# Patient Record
Sex: MALE | Race: WHITE | NOT HISPANIC OR LATINO | Employment: OTHER | URBAN - METROPOLITAN AREA
[De-identification: names, ages, dates, MRNs, and addresses within clinical notes are randomized per-mention and may not be internally consistent; named-entity substitution may affect disease eponyms.]

---

## 2017-01-06 RX ORDER — GABAPENTIN 300 MG/1
CAPSULE ORAL
Qty: 540 CAPSULE | Refills: 2 | Status: SHIPPED | OUTPATIENT
Start: 2017-01-06 | End: 2017-05-10

## 2017-04-12 RX ORDER — CLOPIDOGREL BISULFATE 75 MG/1
TABLET ORAL
Qty: 90 TABLET | Refills: 5 | Status: CANCELLED | OUTPATIENT
Start: 2017-04-12

## 2017-04-17 ENCOUNTER — HOSPITAL ENCOUNTER (OUTPATIENT)
Dept: RADIOLOGY | Facility: HOSPITAL | Age: 67
Discharge: HOME | End: 2017-04-17
Attending: ORTHOPAEDIC SURGERY
Payer: MEDICARE

## 2017-04-17 DIAGNOSIS — M75.121 COMPLETE TEAR OF RIGHT ROTATOR CUFF: ICD-10-CM

## 2017-04-17 PROCEDURE — 73040 CONTRAST X-RAY OF SHOULDER: CPT | Mod: 26,RT,, | Performed by: RADIOLOGY

## 2017-04-17 PROCEDURE — 23350 INJECTION FOR SHOULDER X-RAY: CPT | Mod: TC,PO,RT

## 2017-04-17 PROCEDURE — 25500020 PHARM REV CODE 255: Mod: PO

## 2017-04-17 PROCEDURE — 73201 CT UPPER EXTREMITY W/DYE: CPT | Mod: TC,PO,RT

## 2017-04-17 PROCEDURE — 73201 CT UPPER EXTREMITY W/DYE: CPT | Mod: 26,RT,, | Performed by: RADIOLOGY

## 2017-04-17 PROCEDURE — 23350 INJECTION FOR SHOULDER X-RAY: CPT | Mod: ,,, | Performed by: RADIOLOGY

## 2017-04-17 RX ADMIN — IOHEXOL 6 ML: 240 INJECTION, SOLUTION INTRATHECAL; INTRAVASCULAR; INTRAVENOUS; ORAL at 01:04

## 2017-04-24 ENCOUNTER — TELEPHONE (OUTPATIENT)
Dept: VASCULAR SURGERY | Facility: CLINIC | Age: 67
End: 2017-04-24

## 2017-04-24 NOTE — TELEPHONE ENCOUNTER
----- Message from Shantel Miller sent at 4/24/2017  1:53 PM CDT -----  Contact: Patient in clinic  [facility] has tried three times to get someone to OK the generic Plavix (CLOPIDOGREL) refill. An OK for the refill needs to be faxed to [facility]. Thanks, psl

## 2017-04-25 NOTE — TELEPHONE ENCOUNTER
----- Message from Siri Duke sent at 4/24/2017  4:47 PM CDT -----  Contact: Patient  Patient returning call. Please call back at [phone]. Thanks,

## 2017-04-25 NOTE — TELEPHONE ENCOUNTER
Advised pt that I will need to discuss with  regarding refill on Plavix.  I will notify pt once okayed by .  Verbalized understanding.

## 2017-05-01 ENCOUNTER — TELEPHONE (OUTPATIENT)
Dept: VASCULAR SURGERY | Facility: CLINIC | Age: 67
End: 2017-05-01

## 2017-05-01 NOTE — TELEPHONE ENCOUNTER
----- Message from Juan Diego Chapman sent at 4/28/2017  2:19 PM CDT -----  Contact: same  Patient called in and requested a message be sent regarding patients Plavix Rx.  Patient stated he spoke to nurse on 4/24/17 about this and wanted to talk to her again about this Rx getting it thru [facility] pharmacy.    Patients call back number is [phone]

## 2017-05-01 NOTE — TELEPHONE ENCOUNTER
----- Message from Thomas Sy sent at 5/1/2017 12:49 PM CDT -----  Contact: Self  [phone]  Patient needs an appointment to see the doctor only for left hand pain.  Thanks!

## 2017-05-01 NOTE — TELEPHONE ENCOUNTER
Advised pt that as soon as I get the okay from  I can send in for the refill of Plavix.  Again pt absolutely denies any allergy to Plavix.

## 2017-05-01 NOTE — TELEPHONE ENCOUNTER
----- Message from Jazmin Kumar RN sent at 5/1/2017 10:14 AM CDT -----  Contact: [facility]-  [phone]      ----- Message -----     From: Thomas Sy     Sent: 4/28/2017  12:36 PM       To: Dina Stafford' Staff    Patient need rx refill plavix. 75 mg , erx rx request on 04/21/2017. Thanks!

## 2017-05-01 NOTE — TELEPHONE ENCOUNTER
Spoke to pt. Pt states he has been having a lot of cramping in his L hand and would like to see Dr. Iqbal only. Scheduled pt to see Dr. Iqbal on 5/2. Pt verbalized understanding.

## 2017-05-02 ENCOUNTER — OFFICE VISIT (OUTPATIENT)
Dept: FAMILY MEDICINE | Facility: CLINIC | Age: 67
End: 2017-05-02
Payer: MEDICARE

## 2017-05-02 ENCOUNTER — LAB VISIT (OUTPATIENT)
Dept: LAB | Facility: HOSPITAL | Age: 67
End: 2017-05-02
Attending: FAMILY MEDICINE
Payer: MEDICARE

## 2017-05-02 VITALS
WEIGHT: 265 LBS | BODY MASS INDEX: 41.59 KG/M2 | DIASTOLIC BLOOD PRESSURE: 84 MMHG | RESPIRATION RATE: 18 BRPM | HEART RATE: 72 BPM | SYSTOLIC BLOOD PRESSURE: 144 MMHG | HEIGHT: 67 IN

## 2017-05-02 DIAGNOSIS — I73.9 PAD (PERIPHERAL ARTERY DISEASE): ICD-10-CM

## 2017-05-02 DIAGNOSIS — R73.9 HYPERGLYCEMIA: ICD-10-CM

## 2017-05-02 DIAGNOSIS — R35.89 POLYURIA: ICD-10-CM

## 2017-05-02 DIAGNOSIS — I10 ESSENTIAL HYPERTENSION: ICD-10-CM

## 2017-05-02 DIAGNOSIS — E66.01 MORBID OBESITY, UNSPECIFIED OBESITY TYPE: ICD-10-CM

## 2017-05-02 DIAGNOSIS — M79.642 PAIN OF LEFT HAND: Primary | ICD-10-CM

## 2017-05-02 PROCEDURE — 99499 UNLISTED E&M SERVICE: CPT | Mod: S$GLB,,, | Performed by: FAMILY MEDICINE

## 2017-05-02 PROCEDURE — 83036 HEMOGLOBIN GLYCOSYLATED A1C: CPT

## 2017-05-02 PROCEDURE — 36415 COLL VENOUS BLD VENIPUNCTURE: CPT | Mod: PO

## 2017-05-02 PROCEDURE — 99999 PR PBB SHADOW E&M-EST. PATIENT-LVL III: CPT | Mod: PBBFAC,,, | Performed by: FAMILY MEDICINE

## 2017-05-02 PROCEDURE — 3079F DIAST BP 80-89 MM HG: CPT | Mod: S$GLB,,, | Performed by: FAMILY MEDICINE

## 2017-05-02 PROCEDURE — 99214 OFFICE O/P EST MOD 30 MIN: CPT | Mod: S$GLB,,, | Performed by: FAMILY MEDICINE

## 2017-05-02 PROCEDURE — 1160F RVW MEDS BY RX/DR IN RCRD: CPT | Mod: S$GLB,,, | Performed by: FAMILY MEDICINE

## 2017-05-02 PROCEDURE — 3077F SYST BP >= 140 MM HG: CPT | Mod: S$GLB,,, | Performed by: FAMILY MEDICINE

## 2017-05-02 RX ORDER — OXYCODONE AND ACETAMINOPHEN 10; 325 MG/1; MG/1
TABLET ORAL
Refills: 0 | COMMUNITY
Start: 2017-04-04 | End: 2019-07-19 | Stop reason: SDUPTHER

## 2017-05-02 RX ORDER — LOSARTAN POTASSIUM 100 MG/1
100 TABLET ORAL DAILY
Qty: 90 TABLET | Refills: 3 | Status: SHIPPED | OUTPATIENT
Start: 2017-05-02 | End: 2018-01-03 | Stop reason: SDUPTHER

## 2017-05-02 RX ORDER — GABAPENTIN 300 MG/1
300 CAPSULE ORAL 3 TIMES DAILY
COMMUNITY
End: 2017-05-10 | Stop reason: SDUPTHER

## 2017-05-02 RX ORDER — CLOPIDOGREL BISULFATE 75 MG/1
75 TABLET ORAL DAILY
Qty: 90 TABLET | Refills: 3 | Status: SHIPPED | OUTPATIENT
Start: 2017-05-02 | End: 2018-01-03 | Stop reason: SDUPTHER

## 2017-05-02 NOTE — MR AVS SNAPSHOT
[facility]  [address] [facility] [address]  [place] [address]  Phone: [phone]  Fax: [phone]                  Domingo Vaca   2017 10:30 AM   Office Visit    Description:  Male : 1950   Provider:  Marty Iqbal MD   Department:  [facility]           Reason for Visit     Hand Pain     Urinary Frequency           Diagnoses this Visit        Comments    Pain of left hand    -  Primary     Polyuria         Hyperglycemia         PAD (peripheral artery disease)         Morbid obesity, unspecified obesity type         Essential hypertension                To Do List           Future Appointments        Provider Department Dept Phone    2017 11:50 AM LAB, [place] [facility] [phone]      Goals (5 Years of Data)     None       These Medications        Disp Refills Start End    losartan (COZAAR) 100 MG tablet 90 tablet 3 2017     Take 1 tablet (100 mg total) by mouth once daily. - Oral    Pharmacy: [facility] - [address] Ph #: [phone]         [facility]     [facility] Nurse Care Line -  Assistance  Unless otherwise directed by your provider, please contact [facility], our nurse care line that is available for  assistance.     Registered nurses in the [facility] provide: appointment scheduling, clinical advisement, health education, and other advisory services.  Call: [phone] (toll free)               Medications           Message regarding Medications     Verify the changes and/or additions to your medication regime listed below are the same as discussed with your clinician today.  If any of these changes or additions are incorrect, please notify your healthcare provider.        CHANGE how you are taking these medications     Start Taking Instead of    losartan (COZAAR) 100 MG tablet losartan (COZAAR) 50 MG tablet    Dosage:  Take 1  "tablet (100 mg total) by mouth once daily. Dosage:  TAKE 1 TABLET ONE TIME DAILY    Reason for Change:  Reorder       STOP taking these medications     hydrocodone-acetaminophen 10-325mg (NORCO)  mg Tab Take 1 tablet by mouth every 6 (six) hours as needed.    oxycodone-acetaminophen (PERCOCET) 5-325 mg per tablet Take 1 tablet by mouth every 4 (four) hours as needed for Pain.           Verify that the below list of medications is an accurate representation of the medications you are currently taking.  If none reported, the list may be blank. If incorrect, please contact your healthcare provider. Carry this list with you in case of emergency.           Current Medications     acetaminophen (TYLENOL) 500 mg Cap Take 1,000 mg by mouth continuous prn.     aspirin 81 mg TbEF Take 1 tablet by mouth every evening.    chlorthalidone (HYGROTEN) 25 MG Tab TAKE 1 TABLET ONE TIME DAILY    citalopram (CELEXA) 20 MG tablet TAKE 1 TABLET ONE TIME DAILY    clopidogrel (PLAVIX) 75 mg tablet Take 75 mg by mouth once daily.    gabapentin (NEURONTIN) 300 MG capsule TAKE 2 CAPSULES THREE TIMES DAILY    gabapentin (NEURONTIN) 300 MG capsule Take 300 mg by mouth 3 (three) times daily.    losartan (COZAAR) 100 MG tablet Take 1 tablet (100 mg total) by mouth once daily.    multivit-min-FA-lycopen-lutein 0.4-300-250 mg-mcg-mcg Tab Take 1 tablet by mouth once daily.    oxycodone-acetaminophen (PERCOCET)  mg per tablet TK1 T PO Q 6 H PRN P    simvastatin (ZOCOR) 40 MG tablet TAKE 1 TABLET EVERY EVENING           Clinical Reference Information           Your Vitals Were     BP Pulse Resp Height Weight BMI    144/84 (BP Location: Left arm, Patient Position: Sitting, BP Method: Manual) 72 18 5' 7" (1.702 m) 120.2 kg (264 lb 15.9 oz) 41.5 kg/m2      Blood Pressure          Most Recent Value    BP  (!)  144/84      Allergies as of 5/2/2017     No Known Allergies      Immunizations Administered on Date of Encounter - 5/2/2017     None    "   Orders Placed During Today's Visit     Future Labs/Procedures Expected by Expires    Hemoglobin A1c  5/2/2017 7/1/2018      Language Assistance Services     ATTENTION: Language assistance services are available, free of charge. Please call [phone].      ATENCIÓN: Si marian gonzalez, tiene a erazo disposición servicios gratuitos de asistencia lingüística. Llame al [phone].     CHÚ Ý: N?u b?n nói Ti?ng Vi?t, có các d?ch v? h? tr? ngôn ng? mi?n phí dành cho b?n. G?i s? [phone].         [facility] complies with applicable Federal civil rights laws and does not discriminate on the basis of race, color, national origin, age, disability, or sex.

## 2017-05-02 NOTE — PROGRESS NOTES
Subjective:       Patient ID: Domingo Vaca is a 66 y.o. male    Chief Complaint: Hand Pain (pt states he gets severe patricia horses in his L hand when he moves his fingers) and Urinary Frequency    HPI  Patient with 2 concerns  1. Pain in the left hand with use.  Cramping with fine dexterity.  History of carpal tunnel repair x2 in that hand  2. Increased urinary frequency with no dysuria.  Occasional urgency with occasional dribbling    Review of Systems      Objective:   Physical Exam   Constitutional: He is oriented to person, place, and time. He appears well-developed and well-nourished. No distress.   Musculoskeletal:        Left hand: He exhibits normal range of motion and no tenderness. Normal sensation noted. Decreased strength noted. He exhibits finger abduction. He exhibits no thumb/finger opposition and no wrist extension trouble.   Neurological: He is alert and oriented to person, place, and time.   Vitals reviewed.        Assessment:       1. Pain of left hand     2. Polyuria  Hemoglobin A1c   3. Hyperglycemia  Hemoglobin A1c   4. PAD (peripheral artery disease)     5. Morbid obesity, unspecified obesity type     6. Essential hypertension  losartan (COZAAR) 100 MG tablet         Plan:       Pain of left hand  - Hand strengthening   - Consider OT    Polyuria with Hyperglycemia  -     Hemoglobin A1c; Future; Expected date: 5/2/17  - Consider treatment for BPH    Essential hypertension with PAD (peripheral artery disease) with Morbid obesity, unspecified obesity type  -     INCREASE losartan (COZAAR) 100 MG tablet; Take 1 tablet (100 mg total) by mouth once daily.  Dispense: 90 tablet; Refill: 3  - Continue current therapy  - Serial blood pressure monitoring  - Diet and exercise education.

## 2017-05-03 LAB
ESTIMATED AVG GLUCOSE: 151 MG/DL
HBA1C MFR BLD HPLC: 6.9 %

## 2017-05-10 RX ORDER — GABAPENTIN 300 MG/1
300 CAPSULE ORAL 3 TIMES DAILY
Qty: 270 CAPSULE | Refills: 3 | Status: SHIPPED | OUTPATIENT
Start: 2017-05-10 | End: 2017-05-12 | Stop reason: SDUPTHER

## 2017-05-10 NOTE — TELEPHONE ENCOUNTER
----- Message from Angie Pineda sent at 5/9/2017  4:16 PM CDT -----  Contact: [phone]  Patient requesting a refill on gabapentin 4 capsule 3x a day, he need a new prescription.    Patient will be using   [facility] - [place], [place] - [address]  [address] [address]  Phone: [phone] Fax: [phone]    Please call patient at [phone]. Thanks!

## 2017-05-12 RX ORDER — GABAPENTIN 300 MG/1
1200 CAPSULE ORAL 3 TIMES DAILY
Qty: 270 CAPSULE | Refills: 3 | Status: SHIPPED | OUTPATIENT
Start: 2017-05-12 | End: 2017-05-30

## 2017-05-12 NOTE — TELEPHONE ENCOUNTER
----- Message from RT Sandra sent at 5/12/2017 11:06 AM CDT -----  Contact: [phone]   Pt , requesting medication refill: Gabapentin, please call nurse Leela to confirm, thanks.

## 2017-05-30 ENCOUNTER — TELEPHONE (OUTPATIENT)
Dept: FAMILY MEDICINE | Facility: CLINIC | Age: 67
End: 2017-05-30

## 2017-05-30 RX ORDER — GABAPENTIN 600 MG/1
1200 TABLET ORAL 3 TIMES DAILY
Qty: 540 TABLET | Refills: 3 | Status: SHIPPED | OUTPATIENT
Start: 2017-05-30 | End: 2018-01-09 | Stop reason: SDUPTHER

## 2017-05-30 NOTE — TELEPHONE ENCOUNTER
Called Chika with [facility] pharmacy, insurance wont pay for the high number of capsules for 90 day supply for the gabapentin (300 mg take 4 capsules three times daily). She was wanting to know if can change to 600 mg tablet and take two three times a day. Please advise.

## 2017-05-30 NOTE — TELEPHONE ENCOUNTER
----- Message from Angie Pineda sent at 5/30/2017  9:46 AM CDT -----  Contact: [facility] ph#[phone] ext [phone]  [facility] with [facility] ph#[phone] ext [phone] call to change gabapentin medication to 600mg tab and readjust directions so that insurance will cover the charges.

## 2017-05-31 NOTE — TELEPHONE ENCOUNTER
Called Chika with [facility], advised PCP approved the change in gabapentin and she verbally understood.

## 2017-05-31 NOTE — TELEPHONE ENCOUNTER
----- Message from Kateryna Sosa sent at 5/31/2017 11:10 AM CDT -----  Chika with [facility] is returning nurses call contact her at [phone] ext [phone].    Thank you

## 2017-07-26 RX ORDER — SIMVASTATIN 40 MG/1
TABLET, FILM COATED ORAL
Qty: 90 TABLET | Refills: 0 | Status: SHIPPED | OUTPATIENT
Start: 2017-07-26 | End: 2017-10-02 | Stop reason: SDUPTHER

## 2017-07-26 RX ORDER — CHLORTHALIDONE 25 MG/1
TABLET ORAL
Qty: 90 TABLET | Refills: 0 | Status: SHIPPED | OUTPATIENT
Start: 2017-07-26 | End: 2017-10-02 | Stop reason: SDUPTHER

## 2017-07-26 RX ORDER — CITALOPRAM 20 MG/1
TABLET, FILM COATED ORAL
Qty: 90 TABLET | Refills: 0 | Status: SHIPPED | OUTPATIENT
Start: 2017-07-26 | End: 2017-10-02 | Stop reason: SDUPTHER

## 2017-08-09 ENCOUNTER — HOSPITAL ENCOUNTER (OUTPATIENT)
Dept: RADIOLOGY | Facility: HOSPITAL | Age: 67
Discharge: HOME | End: 2017-08-09
Attending: NURSE PRACTITIONER
Payer: MEDICARE

## 2017-08-09 ENCOUNTER — OFFICE VISIT (OUTPATIENT)
Dept: FAMILY MEDICINE | Facility: CLINIC | Age: 67
End: 2017-08-09
Payer: MEDICARE

## 2017-08-09 VITALS
BODY MASS INDEX: 42.56 KG/M2 | HEART RATE: 88 BPM | SYSTOLIC BLOOD PRESSURE: 142 MMHG | HEIGHT: 67 IN | DIASTOLIC BLOOD PRESSURE: 70 MMHG | WEIGHT: 271.19 LBS

## 2017-08-09 DIAGNOSIS — M79.89 RIGHT LEG SWELLING: ICD-10-CM

## 2017-08-09 DIAGNOSIS — M54.31 RIGHT SCIATIC NERVE PAIN: ICD-10-CM

## 2017-08-09 DIAGNOSIS — M79.661 PAIN OF RIGHT LOWER LEG: ICD-10-CM

## 2017-08-09 DIAGNOSIS — Z96.89 SPINAL CORD STIMULATOR STATUS: Primary | ICD-10-CM

## 2017-08-09 PROCEDURE — 99213 OFFICE O/P EST LOW 20 MIN: CPT | Mod: S$GLB,,, | Performed by: NURSE PRACTITIONER

## 2017-08-09 PROCEDURE — 99999 PR PBB SHADOW E&M-EST. PATIENT-LVL IV: CPT | Mod: PBBFAC,,, | Performed by: NURSE PRACTITIONER

## 2017-08-09 PROCEDURE — 3077F SYST BP >= 140 MM HG: CPT | Mod: S$GLB,,, | Performed by: NURSE PRACTITIONER

## 2017-08-09 PROCEDURE — 3008F BODY MASS INDEX DOCD: CPT | Mod: S$GLB,,, | Performed by: NURSE PRACTITIONER

## 2017-08-09 PROCEDURE — 93971 EXTREMITY STUDY: CPT | Mod: 26,,, | Performed by: RADIOLOGY

## 2017-08-09 PROCEDURE — 3078F DIAST BP <80 MM HG: CPT | Mod: S$GLB,,, | Performed by: NURSE PRACTITIONER

## 2017-08-09 PROCEDURE — 93971 EXTREMITY STUDY: CPT | Mod: TC,PO

## 2017-08-09 NOTE — PROGRESS NOTES
"Subjective:       Patient ID: Domingo Vaca is a 66 y.o. male.    Chief Complaint: Foot Swelling (R foot and ankle)    Severe low back/sciatic pain on right side- followed by pain management in [facility]. Will be scheduled for epidural injection   Today, c/o right leg/foot swelling. Reports "sciatic pain to right side"      Leg Pain    There was no injury mechanism. The pain is present in the right leg. The quality of the pain is described as aching. The pain is moderate. The pain has been fluctuating since onset. Associated symptoms include an inability to bear weight. Pertinent negatives include no loss of sensation, muscle weakness, numbness or tingling. He has tried nothing for the symptoms.     Review of Systems   Constitutional: Positive for activity change. Negative for appetite change, fatigue and fever.   Respiratory: Negative for cough, chest tightness, shortness of breath and wheezing.    Cardiovascular: Negative for chest pain, palpitations and leg swelling.   Gastrointestinal: Negative for abdominal pain, blood in stool, constipation, diarrhea, nausea and vomiting.   Neurological: Negative for tingling and numbness.       Objective:      Physical Exam   Constitutional: He is oriented to person, place, and time. He appears well-nourished.   Cardiovascular: Normal rate, regular rhythm, normal heart sounds and intact distal pulses.    Pulses:       Dorsalis pedis pulses are 2+ on the right side, and 2+ on the left side.        Posterior tibial pulses are 2+ on the right side, and 2+ on the left side.   Pulmonary/Chest: Effort normal and breath sounds normal.   Abdominal: Soft. Bowel sounds are normal. There is no tenderness.   Musculoskeletal:        Right lower leg: He exhibits swelling (2+.. 15in diameter) and edema. He exhibits no tenderness and no bony tenderness.        Left lower leg: He exhibits swelling (13 in diameter). He exhibits no edema.   Neurological: He is alert and oriented to person, " place, and time.   Skin: Skin is warm and dry. No rash noted.   Vitals reviewed.      Assessment:       1. Spinal cord stimulator status    2. Right sciatic nerve pain    3. Pain of right lower leg    4. Right leg swelling        Plan:       Domingo was seen today for foot swelling.    Diagnoses and all orders for this visit:    Spinal cord stimulator status  Followed up with pain management    Right sciatic nerve pain  Followed up with pain management    Pain of right lower leg  -     US Lower Extremity Veins Right; Future    Right leg swelling  -     US Lower Extremity Veins Right; Future    Compression stocking  Elevate extremity  Follow up with me tomorrow

## 2017-08-10 ENCOUNTER — OFFICE VISIT (OUTPATIENT)
Dept: FAMILY MEDICINE | Facility: CLINIC | Age: 67
End: 2017-08-10
Payer: MEDICARE

## 2017-08-10 VITALS
RESPIRATION RATE: 18 BRPM | SYSTOLIC BLOOD PRESSURE: 134 MMHG | BODY MASS INDEX: 42.56 KG/M2 | WEIGHT: 271.19 LBS | HEIGHT: 67 IN | DIASTOLIC BLOOD PRESSURE: 76 MMHG | HEART RATE: 80 BPM

## 2017-08-10 DIAGNOSIS — E66.01 MORBID OBESITY DUE TO EXCESS CALORIES: Primary | ICD-10-CM

## 2017-08-10 DIAGNOSIS — Z96.89 SPINAL CORD STIMULATOR STATUS: ICD-10-CM

## 2017-08-10 DIAGNOSIS — M51.36 DDD (DEGENERATIVE DISC DISEASE), LUMBAR: ICD-10-CM

## 2017-08-10 DIAGNOSIS — R60.0 LEG EDEMA, RIGHT: ICD-10-CM

## 2017-08-10 PROCEDURE — 99213 OFFICE O/P EST LOW 20 MIN: CPT | Mod: S$GLB,,, | Performed by: NURSE PRACTITIONER

## 2017-08-10 PROCEDURE — 99999 PR PBB SHADOW E&M-EST. PATIENT-LVL III: CPT | Mod: PBBFAC,,, | Performed by: NURSE PRACTITIONER

## 2017-08-10 PROCEDURE — 3008F BODY MASS INDEX DOCD: CPT | Mod: S$GLB,,, | Performed by: NURSE PRACTITIONER

## 2017-08-10 PROCEDURE — 3075F SYST BP GE 130 - 139MM HG: CPT | Mod: S$GLB,,, | Performed by: NURSE PRACTITIONER

## 2017-08-10 PROCEDURE — 99499 UNLISTED E&M SERVICE: CPT | Mod: S$GLB,,, | Performed by: NURSE PRACTITIONER

## 2017-08-10 PROCEDURE — 3078F DIAST BP <80 MM HG: CPT | Mod: S$GLB,,, | Performed by: NURSE PRACTITIONER

## 2017-08-15 NOTE — PROGRESS NOTES
Subjective:       Patient ID: Domingo Vaca is a 66 y.o. male.    Chief Complaint: Leg Swelling (R leg; follow up)    Here today for follow up appt for right leg pain and swelling  US yesterday was negative for DVT  Pt was home with compression stocking and instructed to elevate leg  Today, he reports an improvment      Review of Systems    Objective:      Physical Exam   Constitutional: He is oriented to person, place, and time. He appears well-nourished.   Cardiovascular: Normal rate, regular rhythm, normal heart sounds and intact distal pulses.    Pulmonary/Chest: Effort normal and breath sounds normal.   Musculoskeletal:        Right lower leg: He exhibits swelling (1-2 + ... decreasd to 14 in overnight) and edema.        Left lower leg: He exhibits no swelling (13 in) and no edema.   Neurological: He is alert and oriented to person, place, and time.   Skin: Skin is warm and dry.   Vitals reviewed.      Assessment:       1. Morbid obesity due to excess calories [E66.01]    2. Spinal cord stimulator status    3. Leg edema, right    4. DDD (degenerative disc disease), lumbar        Plan:       Domingo was seen today for leg swelling.    Diagnoses and all orders for this visit:    Morbid obesity due to excess calories [E66.01]  Encouraged healthy eating, weight loss and exercise    Spinal cord stimulator status  Continue pain management follow up     Leg edema, right  Compression stockings  Elevate extremities  Avoid legs in dependent position for prolonged periods    DDD (degenerative disc disease), lumbar  Continue pain management follow up

## 2017-10-02 RX ORDER — CHLORTHALIDONE 25 MG/1
TABLET ORAL
Qty: 90 TABLET | Refills: 2 | Status: SHIPPED | OUTPATIENT
Start: 2017-10-02 | End: 2018-01-03 | Stop reason: SDUPTHER

## 2017-10-02 RX ORDER — SIMVASTATIN 40 MG/1
40 TABLET, FILM COATED ORAL NIGHTLY
Qty: 90 TABLET | Refills: 2 | Status: SHIPPED | OUTPATIENT
Start: 2017-10-02 | End: 2018-02-20 | Stop reason: SDUPTHER

## 2017-10-02 RX ORDER — CITALOPRAM 20 MG/1
TABLET, FILM COATED ORAL
Qty: 90 TABLET | Refills: 0 | OUTPATIENT
Start: 2017-10-02

## 2017-10-02 RX ORDER — CHLORTHALIDONE 25 MG/1
TABLET ORAL
Qty: 90 TABLET | Refills: 0 | OUTPATIENT
Start: 2017-10-02

## 2017-10-02 RX ORDER — SIMVASTATIN 40 MG/1
TABLET, FILM COATED ORAL
Qty: 90 TABLET | Refills: 0 | OUTPATIENT
Start: 2017-10-02

## 2017-10-02 RX ORDER — CITALOPRAM 20 MG/1
TABLET, FILM COATED ORAL
Qty: 90 TABLET | Refills: 2 | Status: SHIPPED | OUTPATIENT
Start: 2017-10-02 | End: 2018-01-03 | Stop reason: SDUPTHER

## 2017-10-09 PROCEDURE — G0008 ADMIN INFLUENZA VIRUS VAC: HCPCS | Mod: S$GLB,,, | Performed by: FAMILY MEDICINE

## 2017-10-09 PROCEDURE — 90686 IIV4 VACC NO PRSV 0.5 ML IM: CPT | Mod: S$GLB,,, | Performed by: FAMILY MEDICINE

## 2017-11-08 PROBLEM — G57.30 COMMON PERONEAL NEUROPATHY: Status: ACTIVE | Noted: 2017-11-08

## 2018-01-03 DIAGNOSIS — I10 ESSENTIAL HYPERTENSION: ICD-10-CM

## 2018-01-03 RX ORDER — CLOPIDOGREL BISULFATE 75 MG/1
TABLET ORAL
Qty: 90 TABLET | Refills: 1 | Status: SHIPPED | OUTPATIENT
Start: 2018-01-03 | End: 2018-02-20 | Stop reason: SDUPTHER

## 2018-01-03 RX ORDER — LOSARTAN POTASSIUM 100 MG/1
TABLET ORAL
Qty: 90 TABLET | Refills: 1 | Status: SHIPPED | OUTPATIENT
Start: 2018-01-03 | End: 2018-02-20 | Stop reason: SDUPTHER

## 2018-01-04 ENCOUNTER — PATIENT MESSAGE (OUTPATIENT)
Dept: FAMILY MEDICINE | Facility: CLINIC | Age: 68
End: 2018-01-04

## 2018-01-04 RX ORDER — CHLORTHALIDONE 25 MG/1
TABLET ORAL
Qty: 90 TABLET | Refills: 0 | Status: SHIPPED | OUTPATIENT
Start: 2018-01-04 | End: 2018-02-20 | Stop reason: SDUPTHER

## 2018-01-04 RX ORDER — CITALOPRAM 20 MG/1
TABLET, FILM COATED ORAL
Qty: 90 TABLET | Refills: 0 | Status: SHIPPED | OUTPATIENT
Start: 2018-01-04 | End: 2018-02-20 | Stop reason: SDUPTHER

## 2018-01-04 NOTE — TELEPHONE ENCOUNTER
Temporary refill given. Will need appt with PCP team within 3 months for routine follow up and future med refills . Please schedule. Thank you

## 2018-01-10 RX ORDER — GABAPENTIN 600 MG/1
TABLET ORAL
Qty: 540 TABLET | Refills: 1 | Status: SHIPPED | OUTPATIENT
Start: 2018-01-10 | End: 2018-02-20 | Stop reason: SDUPTHER

## 2018-01-22 ENCOUNTER — TELEPHONE (OUTPATIENT)
Dept: FAMILY MEDICINE | Facility: CLINIC | Age: 68
End: 2018-01-22

## 2018-01-22 DIAGNOSIS — M54.9 BACK PAIN, UNSPECIFIED BACK LOCATION, UNSPECIFIED BACK PAIN LATERALITY, UNSPECIFIED CHRONICITY: Primary | ICD-10-CM

## 2018-01-22 NOTE — TELEPHONE ENCOUNTER
----- Message from Shantel Miller sent at 1/22/2018  3:03 PM CST -----  Contact: PT  Mr Vaca came into the clinic today to speak with Leela. He stated it was about a referral. He asked that someone return his call. Thanks psl

## 2018-01-22 NOTE — TELEPHONE ENCOUNTER
Spoke to patient and states he is wanting a referral to see  for a second opinion for his back. Pended to you. Please advise. Thanks.

## 2018-02-14 ENCOUNTER — OFFICE VISIT (OUTPATIENT)
Dept: NEUROSURGERY | Facility: CLINIC | Age: 68
End: 2018-02-14
Payer: MEDICARE

## 2018-02-14 VITALS
DIASTOLIC BLOOD PRESSURE: 75 MMHG | HEIGHT: 67 IN | SYSTOLIC BLOOD PRESSURE: 165 MMHG | HEART RATE: 84 BPM | BODY MASS INDEX: 42.21 KG/M2 | WEIGHT: 268.94 LBS

## 2018-02-14 DIAGNOSIS — Z96.89 S/P INSERTION OF SPINAL CORD STIMULATOR: ICD-10-CM

## 2018-02-14 DIAGNOSIS — Z96.89 SPINAL CORD STIMULATOR STATUS: ICD-10-CM

## 2018-02-14 DIAGNOSIS — M21.962 LEFT ANKLE JOINT DEFORMITY: ICD-10-CM

## 2018-02-14 DIAGNOSIS — M17.0 OSTEOARTHRITIS OF BOTH KNEES, UNSPECIFIED OSTEOARTHRITIS TYPE: Primary | ICD-10-CM

## 2018-02-14 DIAGNOSIS — Z98.1 S/P LUMBAR SPINAL FUSION: ICD-10-CM

## 2018-02-14 DIAGNOSIS — M48.02 CERVICAL STENOSIS OF SPINE: ICD-10-CM

## 2018-02-14 DIAGNOSIS — M47.816 LUMBAR FACET ARTHROPATHY: ICD-10-CM

## 2018-02-14 DIAGNOSIS — G62.9 PERIPHERAL POLYNEUROPATHY: ICD-10-CM

## 2018-02-14 DIAGNOSIS — I73.9 PAD (PERIPHERAL ARTERY DISEASE): ICD-10-CM

## 2018-02-14 DIAGNOSIS — G57.30 COMMON PERONEAL NEUROPATHY, UNSPECIFIED LATERALITY: ICD-10-CM

## 2018-02-14 DIAGNOSIS — E66.01 MORBID OBESITY DUE TO EXCESS CALORIES: ICD-10-CM

## 2018-02-14 DIAGNOSIS — M48.062 LUMBAR STENOSIS WITH NEUROGENIC CLAUDICATION: ICD-10-CM

## 2018-02-14 DIAGNOSIS — G90.522 COMPLEX REGIONAL PAIN SYNDROME TYPE 1 OF LEFT LOWER EXTREMITY: ICD-10-CM

## 2018-02-14 DIAGNOSIS — Z98.1 S/P CERVICAL SPINAL FUSION: ICD-10-CM

## 2018-02-14 PROCEDURE — 99205 OFFICE O/P NEW HI 60 MIN: CPT | Mod: S$GLB,,, | Performed by: NEUROLOGICAL SURGERY

## 2018-02-14 PROCEDURE — 3008F BODY MASS INDEX DOCD: CPT | Mod: S$GLB,,, | Performed by: NEUROLOGICAL SURGERY

## 2018-02-14 PROCEDURE — 99999 PR PBB SHADOW E&M-EST. PATIENT-LVL III: CPT | Mod: PBBFAC,,, | Performed by: NEUROLOGICAL SURGERY

## 2018-02-14 PROCEDURE — 99499 UNLISTED E&M SERVICE: CPT | Mod: S$GLB,,, | Performed by: NEUROLOGICAL SURGERY

## 2018-02-14 NOTE — LETTER
February 14, 2018      Marty Iqbal MD  [address] [facility] [address] [place] [address]           [facility]  [address] [facility] [address] [place] [address]  Phone: [phone]  Fax: [phone]          Patient: Domingo Vaca   MR Number: [id number]   YOB: 1950   Date of Visit: 2/14/2018       Dear Dr. Marty Iqbal:    Thank you for referring Domingo Vaca to me for evaluation. Attached you will find relevant portions of my assessment and plan of care.    If you have questions, please do not hesitate to call me. I look forward to following Domingo Vaca along with you.    Sincerely,    Sergio Camp MD    Enclosure  CC:  No Recipients    If you would like to receive this communication electronically, please contact [email or web address] or [phone] to request more information on [employer] access.    For providers and/or their staff who would like to refer a patient to [facility], please contact us through our one-stop-shop provider referral line, [facility], at [phone].    If you feel you have received this communication in error or would no longer like to receive these types of communications, please e-mail [email or web address]

## 2018-02-15 ENCOUNTER — TELEPHONE (OUTPATIENT)
Dept: FAMILY MEDICINE | Facility: CLINIC | Age: 68
End: 2018-02-15

## 2018-02-15 NOTE — TELEPHONE ENCOUNTER
----- Message from Krys Gordillo sent at 2/15/2018 10:21 AM CST -----  Contact: self  Patient [phone] is scheduled for back surgery on 03 27 18 and is needing a preop appt/please call patient to schedule as I do not show any appts until May 2018

## 2018-02-15 NOTE — TELEPHONE ENCOUNTER
Spoke to pt. Pt states he is having surgery on 3/27 and needs to see Dr. Iqbal for a pre op clearance prior. Scheduled pt to see Dr. Iqbal on 3/14. Pt verbalized understanding.

## 2018-02-16 ENCOUNTER — TELEPHONE (OUTPATIENT)
Dept: FAMILY MEDICINE | Facility: CLINIC | Age: 68
End: 2018-02-16

## 2018-02-16 NOTE — TELEPHONE ENCOUNTER
Patient states he received call from neuro to reschedule surgery, new date 3/6/2018.   Pt clearance rescheduled 2/20/18 with Maryana Hsu

## 2018-02-16 NOTE — TELEPHONE ENCOUNTER
----- Message from Kateryna Sosa sent at 2/16/2018  2:34 PM CST -----  Patient is requesting a return call concerning his upcoming appointment contact him at [phone].    Thank you

## 2018-02-19 ENCOUNTER — TELEPHONE (OUTPATIENT)
Dept: NEUROSURGERY | Facility: CLINIC | Age: 68
End: 2018-02-19

## 2018-02-19 DIAGNOSIS — M47.816 LUMBAR FACET ARTHROPATHY: ICD-10-CM

## 2018-02-19 DIAGNOSIS — M48.062 LUMBAR STENOSIS WITH NEUROGENIC CLAUDICATION: Primary | ICD-10-CM

## 2018-02-19 DIAGNOSIS — R82.90 ABNORMAL FINDING IN URINE: ICD-10-CM

## 2018-02-19 DIAGNOSIS — R79.1 ABNORMAL COAGULATION PROFILE: ICD-10-CM

## 2018-02-19 DIAGNOSIS — Z98.1 S/P LUMBAR SPINAL FUSION: ICD-10-CM

## 2018-02-19 RX ORDER — LIDOCAINE HYDROCHLORIDE 10 MG/ML
1 INJECTION, SOLUTION EPIDURAL; INFILTRATION; INTRACAUDAL; PERINEURAL ONCE
Status: CANCELLED | OUTPATIENT
Start: 2018-02-19 | End: 2018-02-19

## 2018-02-19 RX ORDER — SODIUM CHLORIDE, SODIUM LACTATE, POTASSIUM CHLORIDE, CALCIUM CHLORIDE 600; 310; 30; 20 MG/100ML; MG/100ML; MG/100ML; MG/100ML
INJECTION, SOLUTION INTRAVENOUS CONTINUOUS
Status: CANCELLED | OUTPATIENT
Start: 2018-02-19

## 2018-02-19 NOTE — TELEPHONE ENCOUNTER
Called patient regarding upcoming surgery on 3/6/18 with Dr. Camp. Pre and post-operative appointments reviewed. Patient aware of stopping all anti-coagulant, anti-inflammatory, anti-platelet medications for 1 week prior to surgery. Address confirmed and appointment reminder letter and post-operative instructions mailed to patient. Informed patient to call with any further questions. Patient verbalized understanding.

## 2018-02-19 NOTE — TELEPHONE ENCOUNTER
Domingo Vaca will be having surgery on 3/6/18 with Dr. Camp, Neurosurgeon, at [facility]. We are reaching out to obtain a surgical clearance from Dr. Iqbal to ensure the safety of our patient. The surgery is a lumbar fusion and will be under general anesthesia. This procedure typically lasts approximately 3.5 hours. We request that the patient hold all anticoagulant/antiinflammatory medications for 5-7 days prior to surgery. Please let us know if our office can be of further assistance.

## 2018-02-19 NOTE — TELEPHONE ENCOUNTER
Pt will need to schedule an appointment with either  or his PA Maryana Cool for the surgical clearance. Do we need to call patient to schedule or do yall do that?

## 2018-02-20 ENCOUNTER — OFFICE VISIT (OUTPATIENT)
Dept: FAMILY MEDICINE | Facility: CLINIC | Age: 68
End: 2018-02-20
Payer: MEDICARE

## 2018-02-20 ENCOUNTER — HOSPITAL ENCOUNTER (OUTPATIENT)
Dept: RADIOLOGY | Facility: HOSPITAL | Age: 68
Discharge: HOME | End: 2018-02-20
Attending: PHYSICIAN ASSISTANT
Payer: MEDICARE

## 2018-02-20 VITALS
SYSTOLIC BLOOD PRESSURE: 140 MMHG | DIASTOLIC BLOOD PRESSURE: 72 MMHG | HEART RATE: 80 BPM | BODY MASS INDEX: 42.7 KG/M2 | WEIGHT: 272.06 LBS | HEIGHT: 67 IN

## 2018-02-20 DIAGNOSIS — I10 ESSENTIAL HYPERTENSION: ICD-10-CM

## 2018-02-20 DIAGNOSIS — Z01.818 PRE-OP EXAMINATION: ICD-10-CM

## 2018-02-20 DIAGNOSIS — Z01.818 PRE-OP EXAMINATION: Primary | ICD-10-CM

## 2018-02-20 PROCEDURE — 93010 ELECTROCARDIOGRAM REPORT: CPT | Mod: S$GLB,,, | Performed by: INTERNAL MEDICINE

## 2018-02-20 PROCEDURE — 71046 X-RAY EXAM CHEST 2 VIEWS: CPT | Mod: TC,FY,PO

## 2018-02-20 PROCEDURE — 3008F BODY MASS INDEX DOCD: CPT | Mod: S$GLB,,, | Performed by: PHYSICIAN ASSISTANT

## 2018-02-20 PROCEDURE — 99999 PR PBB SHADOW E&M-EST. PATIENT-LVL III: CPT | Mod: PBBFAC,,, | Performed by: PHYSICIAN ASSISTANT

## 2018-02-20 PROCEDURE — 99214 OFFICE O/P EST MOD 30 MIN: CPT | Mod: S$GLB,,, | Performed by: PHYSICIAN ASSISTANT

## 2018-02-20 PROCEDURE — 93005 ELECTROCARDIOGRAM TRACING: CPT | Mod: S$GLB,,, | Performed by: PHYSICIAN ASSISTANT

## 2018-02-20 PROCEDURE — 71046 X-RAY EXAM CHEST 2 VIEWS: CPT | Mod: 26,,, | Performed by: RADIOLOGY

## 2018-02-20 RX ORDER — LOSARTAN POTASSIUM 100 MG/1
100 TABLET ORAL DAILY
Qty: 90 TABLET | Refills: 3 | Status: SHIPPED | OUTPATIENT
Start: 2018-02-20 | End: 2018-09-29 | Stop reason: SDUPTHER

## 2018-02-20 RX ORDER — ETODOLAC 400 MG/1
TABLET, FILM COATED ORAL
COMMUNITY
Start: 2018-01-23 | End: 2018-02-22 | Stop reason: CLARIF

## 2018-02-20 RX ORDER — SIMVASTATIN 40 MG/1
40 TABLET, FILM COATED ORAL NIGHTLY
Qty: 90 TABLET | Refills: 3 | Status: SHIPPED | OUTPATIENT
Start: 2018-02-20 | End: 2018-09-29 | Stop reason: SDUPTHER

## 2018-02-20 RX ORDER — CHLORTHALIDONE 25 MG/1
25 TABLET ORAL DAILY
Qty: 90 TABLET | Refills: 3 | Status: ON HOLD | OUTPATIENT
Start: 2018-02-20 | End: 2018-03-27 | Stop reason: HOSPADM

## 2018-02-20 RX ORDER — ACETAMINOPHEN 500 MG/1
CAPSULE, LIQUID FILLED ORAL
COMMUNITY
Start: 2017-12-01 | End: 2018-02-20 | Stop reason: SDUPTHER

## 2018-02-20 RX ORDER — GABAPENTIN 600 MG/1
1200 TABLET ORAL 3 TIMES DAILY
Qty: 540 TABLET | Refills: 3 | Status: SHIPPED | OUTPATIENT
Start: 2018-02-20 | End: 2018-10-01 | Stop reason: SDUPTHER

## 2018-02-20 RX ORDER — CITALOPRAM 20 MG/1
20 TABLET, FILM COATED ORAL DAILY
Qty: 90 TABLET | Refills: 3 | Status: SHIPPED | OUTPATIENT
Start: 2018-02-20 | End: 2018-09-29 | Stop reason: SDUPTHER

## 2018-02-20 RX ORDER — CLOPIDOGREL BISULFATE 75 MG/1
75 TABLET ORAL DAILY
Qty: 90 TABLET | Refills: 3 | Status: SHIPPED | OUTPATIENT
Start: 2018-02-20 | End: 2018-10-01 | Stop reason: SDUPTHER

## 2018-02-20 NOTE — PROGRESS NOTES
The patient presents today for pre op evaluation prior to lumbar spine surgery with Dr Camp that is scheduled for 3/6/18.  He has had 6 previous spine surgeries on the neck and lower back.       Past Medical History:  Past Medical History:   Diagnosis Date    Anticoagulant long-term use     Arthritis     Cervical stenosis of spine     Chronic cervical pain     Chronic lumbar pain     Claudication of both lower extremities     HTN (hypertension)     Hyperlipidemia     Obesity     PVD (peripheral vascular disease)     RSD lower limb     Left ankle     Past Surgical History:   Procedure Laterality Date    ANGIOPLASTY      Aortagram with Runoff    ANKLE SURGERY Left     Multiple surgeries    APPENDECTOMY      BACK SURGERY      5 total    CARPAL TUNNEL RELEASE Bilateral     left twice; right once    CERVICAL FUSION      over 5 levels    COLONOSCOPY      pain injection      Multiple    POSTERIOR FUSION LUMBAR SPINE W/ CORPECTOMY      twice in last 18 mos (3/2016)    SPINAL CORD STIMULATOR IMPLANT  implanted 12/8/15    having great pain relief    SYMPATHECTOMY       Review of patient's allergies indicates:  No Known Allergies  Current Outpatient Prescriptions on File Prior to Visit   Medication Sig Dispense Refill    acetaminophen (TYLENOL) 500 mg Cap Take 1,000 mg by mouth continuous prn.       aspirin 81 mg TbEF Take 1 tablet by mouth every evening.      multivit-min-FA-lycopen-lutein 0.4-300-250 mg-mcg-mcg Tab Take 1 tablet by mouth once daily.      oxycodone-acetaminophen (PERCOCET)  mg per tablet TK1 T PO Q 6 H PRN P  0    [DISCONTINUED] chlorthalidone (HYGROTEN) 25 MG Tab TAKE 1 TABLET EVERY DAY 90 tablet 0    [DISCONTINUED] citalopram (CELEXA) 20 MG tablet TAKE 1 TABLET EVERY DAY 90 tablet 0    [DISCONTINUED] clopidogrel (PLAVIX) 75 mg tablet TAKE 1 TABLET EVERY DAY 90 tablet 1    [DISCONTINUED] gabapentin (NEURONTIN) 600 MG tablet TAKE 2 TABLETS THREE TIMES DAILY 540 tablet 1     [DISCONTINUED] losartan (COZAAR) 100 MG tablet TAKE 1 TABLET ONE TIME DAILY 90 tablet 1    [DISCONTINUED] simvastatin (ZOCOR) 40 MG tablet Take 1 tablet (40 mg total) by mouth every evening. 90 tablet 2     No current facility-administered medications on file prior to visit.      Social History     Social History    Marital status:      Spouse name: N/A    Number of children: N/A    Years of education: N/A     Occupational History    Not on file.     Social History Main Topics    Smoking status: Former Smoker     Packs/day: 2.00     Years: 45.00     Quit date: 9/1/2011    Smokeless tobacco: Never Used    Alcohol use No    Drug use: No    Sexual activity: Not on file     Other Topics Concern    Not on file     Social History Narrative    No narrative on file     Family History   Problem Relation Age of Onset    No Known Problems Mother     Heart disease Father     Early death Father          ROS:GENERAL: No fever, chills, fatigability or weight loss.  SKIN: No rashes, itching or changes in color or texture of skin.  HEAD: No headaches or recent head trauma.EYES: Visual acuity fine. No photophobia, ocular pain or diplopia.EARS: Denies ear pain, discharge or vertigo.NOSE: No loss of smell, no epistaxis or postnasal drip.MOUTH & THROAT: No hoarseness or change in voice. No excessive gum bleeding.NODES: Denies swollen glands.  CHEST: Denies KAMARA, cyanosis, wheezing, cough and sputum production.  CARDIOVASCULAR: Denies chest pain, PND, orthopnea or reduced exercise tolerance.  ABDOMEN: Appetite fine. No weight loss. Denies diarrhea, abdominal pain, hematemesis or blood in stool.  URINARY: No flank pain, dysuria or hematuria.  PERIPHERAL VASCULAR: No claudication or cyanosis.  MUSCULOSKELETAL: See above.  NEUROLOGIC: No history of seizures, paralysis, alteration of gait or coordination.    PE:   HEAD: Normocephalic, atraumatic.EYES: PERRL. EOMI.   EARS: TM's intact. Light reflex normal. No retraction  or perforation.   NOSE: Mucosa pink. Airway clear.MOUTH & THROAT: No tonsillar enlargement. No pharyngeal erythema or exudate. No stridor.  NODES: No cervical, axillary or inguinal lymph node enlargement.  CHEST: Lungs clear to auscultation.  CARDIOVASCULAR: Normal S1, S2. No rubs, murmurs or gallops.  ABDOMEN: Bowel sounds normal. Not distended. Soft. No tenderness or masses.  MUSCULOSKELETAL: No palpable abnormality  NEUROLOGIC: Cranial Nerves: II-XII grossly intact.  Motor: 5/5 strength major flexors/extensors.  DTR's: Knees, Ankles 2+ and equal bilaterally; downgoing toes.  Sensory: Intact to light touch distally.  Gait & Posture: Normal gait and fine motion. No cerebellar signs.     Impression:Pre - op exam, cleared for surgery   Plan:Lab dreadal

## 2018-02-23 DIAGNOSIS — E11.9 DIABETES MELLITUS WITHOUT COMPLICATION: ICD-10-CM

## 2018-02-26 ENCOUNTER — TELEPHONE (OUTPATIENT)
Dept: NEUROSURGERY | Facility: CLINIC | Age: 68
End: 2018-02-26

## 2018-03-07 PROBLEM — J96.01 ACUTE RESPIRATORY FAILURE WITH HYPOXIA: Status: ACTIVE | Noted: 2018-03-07

## 2018-03-10 PROBLEM — R73.9 HYPERGLYCEMIA: Status: ACTIVE | Noted: 2018-03-10

## 2018-03-11 PROBLEM — E11.49 TYPE 2 DIABETES MELLITUS WITH NEUROLOGIC COMPLICATION, WITHOUT LONG-TERM CURRENT USE OF INSULIN: Status: ACTIVE | Noted: 2018-03-10

## 2018-03-12 ENCOUNTER — TELEPHONE (OUTPATIENT)
Dept: FAMILY MEDICINE | Facility: CLINIC | Age: 68
End: 2018-03-12

## 2018-03-12 NOTE — TELEPHONE ENCOUNTER
Left message asking pt to return my call at     Reason : schedule [employer] health risk assessment appt

## 2018-03-13 PROBLEM — M25.562 ACUTE PAIN OF LEFT KNEE: Status: ACTIVE | Noted: 2018-03-13

## 2018-03-14 ENCOUNTER — TELEPHONE (OUTPATIENT)
Dept: ORTHOPEDICS | Facility: CLINIC | Age: 68
End: 2018-03-14

## 2018-03-14 NOTE — TELEPHONE ENCOUNTER
----- Message from Jazmin Grant sent at 3/14/2018  8:15 AM CDT -----  Contact: Jacqueline with [facility] consult with Dr. Jeanna Crowe regarding intense left knee cap pain after patient had a fall at the hospital.  Call Back#[phone]  Thanks

## 2018-03-14 NOTE — TELEPHONE ENCOUNTER
Contacted Jacqueline with [facility]. Advised Dr Victoria is not the on call physician today 3/14/18. Per Dr Julien Weinberg is the on call physician. Jacqueline Verbalized understanding.

## 2018-03-16 DIAGNOSIS — E11.9 TYPE 2 DIABETES MELLITUS WITHOUT COMPLICATION: ICD-10-CM

## 2018-03-19 ENCOUNTER — TELEPHONE (OUTPATIENT)
Dept: NEUROSURGERY | Facility: CLINIC | Age: 68
End: 2018-03-19

## 2018-03-19 DIAGNOSIS — M48.062 LUMBAR STENOSIS WITH NEUROGENIC CLAUDICATION: Primary | ICD-10-CM

## 2018-03-19 NOTE — TELEPHONE ENCOUNTER
----- Message from Kristin Hobbs PA-C sent at 3/19/2018 10:31 AM CDT -----  Hi,    This patient needs a lumbar x-ray standing ap/lat prior to 4 week post-op appointment and an EMG/NCS of bilateral lower extremities prior to follow-up as well.     Thanks,    Tiff

## 2018-04-05 ENCOUNTER — OFFICE VISIT (OUTPATIENT)
Dept: NEUROSURGERY | Facility: CLINIC | Age: 68
End: 2018-04-05
Payer: MEDICARE

## 2018-04-05 ENCOUNTER — HOSPITAL ENCOUNTER (OUTPATIENT)
Dept: RADIOLOGY | Facility: HOSPITAL | Age: 68
Discharge: HOME | End: 2018-04-05
Attending: NEUROLOGICAL SURGERY
Payer: MEDICARE

## 2018-04-05 VITALS
HEART RATE: 105 BPM | BODY MASS INDEX: 40.35 KG/M2 | HEIGHT: 67 IN | WEIGHT: 257.06 LBS | DIASTOLIC BLOOD PRESSURE: 60 MMHG | SYSTOLIC BLOOD PRESSURE: 92 MMHG

## 2018-04-05 DIAGNOSIS — R94.131 ABNORMAL ELECTROMYOGRAM (EMG): ICD-10-CM

## 2018-04-05 DIAGNOSIS — Z98.1 S/P LUMBAR SPINAL FUSION: Primary | ICD-10-CM

## 2018-04-05 PROCEDURE — 99999 PR PBB SHADOW E&M-EST. PATIENT-LVL III: CPT | Mod: PBBFAC,,, | Performed by: PHYSICIAN ASSISTANT

## 2018-04-05 PROCEDURE — 72100 X-RAY EXAM L-S SPINE 2/3 VWS: CPT | Mod: 26,,, | Performed by: RADIOLOGY

## 2018-04-05 PROCEDURE — 99024 POSTOP FOLLOW-UP VISIT: CPT | Mod: S$GLB,,, | Performed by: PHYSICIAN ASSISTANT

## 2018-04-05 PROCEDURE — 72100 X-RAY EXAM L-S SPINE 2/3 VWS: CPT | Mod: TC,FY,PO

## 2018-04-12 ENCOUNTER — TELEPHONE (OUTPATIENT)
Dept: NEUROLOGY | Facility: CLINIC | Age: 68
End: 2018-04-12

## 2018-04-12 NOTE — TELEPHONE ENCOUNTER
----- Message from Krystle Moya LPN sent at 4/12/2018 12:44 PM CDT -----  Please schedule pt for EMG BLE for 2-3 months from now per ARMINDA Sarah PA-C. He will need to f/u with Dr. Camp after the EMG. Thanks in advance.

## 2018-04-12 NOTE — TELEPHONE ENCOUNTER
----- Message from Krystle Moya LPN sent at 4/12/2018 12:45 PM CDT -----  A referral has been placed in the system for this pt per ARMINDA Sarah PA-C. Please call pt and schedule appt. Thanks in advance!

## 2018-04-30 NOTE — PROGRESS NOTES
Neurosurgery Outpatient Follow Up    Patient ID: Domingo Vaca is a 67 y.o. male.    Chief Complaint   Patient presents with    Follow-up     4 week follow up L2-S1 ext of fusion with L2-4 decompression. Pt has been having left leg pain, numbness, and tingling since the surgery. Back pain has improved completely since surgery.           Review of Systems   Constitutional: Negative for activity change, appetite change, chills, fever and unexpected weight change.   HENT: Negative for tinnitus, trouble swallowing and voice change.    Respiratory: Negative for apnea, cough, chest tightness and shortness of breath.    Cardiovascular: Negative for chest pain and palpitations.   Gastrointestinal: Negative for constipation, diarrhea, nausea and vomiting.   Genitourinary: Negative for difficulty urinating, dysuria, frequency and urgency.   Musculoskeletal: Positive for arthralgias, back pain, gait problem, joint swelling, myalgias, neck pain and neck stiffness.   Skin: Negative for wound.   Neurological: Positive for weakness and numbness. Negative for dizziness, tremors, seizures, facial asymmetry, speech difficulty, light-headedness and headaches.   Psychiatric/Behavioral: Positive for sleep disturbance. Negative for confusion and decreased concentration.       Past Medical History:   Diagnosis Date    Adjacent segment disease with spinal stenosis 02/2018    Anticoagulant long-term use     Arthritis     Cervical stenosis of spine     Chronic cervical pain     Chronic lumbar pain     Claudication of both lower extremities     HTN (hypertension)     Hyperlipidemia     Lumbar facet arthropathy 02/2018    Lumbar stenosis with neurogenic claudication 02/2018    Obesity     PVD (peripheral vascular disease)     RSD lower limb     Left ankle    S/P insertion of spinal cord stimulator     S/P lumbar spinal fusion     multiple     Social History     Social History    Marital status:      Spouse name: N/A  "   Number of children: N/A    Years of education: N/A     Occupational History    Not on file.     Social History Main Topics    Smoking status: Former Smoker     Packs/day: 2.00     Years: 45.00     Types: Cigarettes     Quit date: 9/1/2011    Smokeless tobacco: Never Used    Alcohol use No    Drug use: No    Sexual activity: Not on file     Other Topics Concern    Not on file     Social History Narrative    No narrative on file     Family History   Problem Relation Age of Onset    No Known Problems Mother     Heart disease Father     Early death Father 56     MI     Review of patient's allergies indicates:  No Known Allergies    Current Outpatient Prescriptions:     acetaminophen (TYLENOL) 500 mg Cap, Take 1,000 mg by mouth continuous prn. , Disp: , Rfl:     aspirin 81 mg TbEF, Take 1 tablet by mouth every evening., Disp: , Rfl:     CHLORPHENIRAMINE/DEXTROMETHORP (COUGH AND COLD BP ORAL), , Disp: , Rfl:     citalopram (CELEXA) 20 MG tablet, Take 1 tablet (20 mg total) by mouth once daily., Disp: 90 tablet, Rfl: 3    clopidogrel (PLAVIX) 75 mg tablet, Take 1 tablet (75 mg total) by mouth once daily., Disp: 90 tablet, Rfl: 3    gabapentin (NEURONTIN) 600 MG tablet, Take 2 tablets (1,200 mg total) by mouth 3 (three) times daily., Disp: 540 tablet, Rfl: 3    losartan (COZAAR) 100 MG tablet, Take 1 tablet (100 mg total) by mouth once daily., Disp: 90 tablet, Rfl: 3    metFORMIN (GLUCOPHAGE) 1000 MG tablet, Take 1 tablet (1,000 mg total) by mouth 2 (two) times daily with meals., Disp: 60 tablet, Rfl: 0    oxycodone-acetaminophen (PERCOCET)  mg per tablet, TK1 T PO Q 4 H PRN P, Disp: , Rfl: 0    simvastatin (ZOCOR) 40 MG tablet, Take 1 tablet (40 mg total) by mouth every evening., Disp: 90 tablet, Rfl: 3    glipiZIDE (GLUCOTROL) 5 MG tablet, Take 0.5 tablets (2.5 mg total) by mouth daily with breakfast., Disp: 30 tablet, Rfl: 0  Blood pressure 92/60, pulse 105, height 5' 7" (1.702 m), weight " 116.6 kg (257 lb 0.9 oz).      Neurologic Exam     Mental Status   Oriented to person, place, and time.   Speech: speech is normal     Cranial Nerves     CN III, IV, VI   Pupils are equal, round, and reactive to light.    Motor Exam     Strength   Strength 5/5 throughout.   Right neck flexion: 5/5  Left neck flexion: 5/5  Right neck extension: 5/5  Left neck extension: 5/5  Right deltoid: 5/5  Left deltoid: 5/5  Right biceps: 5/5  Left biceps: 5/5  Right triceps: 5/5  Left triceps: 5/5  Right wrist flexion: 5/5  Left wrist flexion: 5/5  Right wrist extension: 5/5  Left wrist extension: 5/5  Right interossei: 5/5  Left interossei: 5/5  Right abdominals: 5/5  Left abdominals: 5/5  Right iliopsoas: 5/5  Left iliopsoas: 2/5  Right quadriceps: 5/5  Left quadriceps: 4/5  Right hamstrin/5  Left hamstrin/5  Right glutei: 5/5  Left glutei: 5/5  Right anterior tibial: 5/5  Left anterior tibial: 5/5  Right posterior tibial: 5/5  Left posterior tibial: 5/5  Right peroneal: 5/5  Left peroneal: 5/5  Right gastroc: 5/5  Left gastroc: 5/5    Gait, Coordination, and Reflexes     Reflexes   Right brachioradialis: 1+  Left brachioradialis: 1+  Right biceps: 1+  Left biceps: 1+  Right triceps: 1+  Left triceps: 1+  Right patellar: 1+  Left patellar: 0  Right achilles: 0  Left achilles: 0      Physical Exam   Constitutional: He is oriented to person, place, and time. He appears well-developed and well-nourished.   Morbid central obesity   HENT:   Head: Normocephalic and atraumatic.   Eyes: Pupils are equal, round, and reactive to light.   Neck: Normal range of motion. Neck supple.   Cardiovascular: Normal pulses.    Pulmonary/Chest: Effort normal.   Musculoskeletal: Normal range of motion. He exhibits no edema.   Neurological: He is oriented to person, place, and time. He has normal strength. He displays atrophy. He displays no tremor. A sensory deficit is present. He exhibits normal muscle tone. He displays no seizure activity.  Coordination and gait abnormal. GCS eye subscore is 4. GCS verbal subscore is 5. GCS motor subscore is 6.   Reflex Scores:       Tricep reflexes are 1+ on the right side and 1+ on the left side.       Bicep reflexes are 1+ on the right side and 1+ on the left side.       Brachioradialis reflexes are 1+ on the right side and 1+ on the left side.       Patellar reflexes are 1+ on the right side and 0 on the left side.       Achilles reflexes are 0 on the right side and 0 on the left side.  Antalgic gait limps with right leg  Loss of sensation and tingling right leg  Diminished vibration and proprioception both ankles  Cannot toe walk or heel walk         Skin: Skin is warm, dry and intact.   Psychiatric: He has a normal mood and affect. His speech is normal and behavior is normal. Judgment and thought content normal.   Nursing note and vitals reviewed.      Provider dictation:    The patient presents today for postoperative visit.  He is 4 weeks status post L2-S1 extension of fusion with L2-L4 decompression.  He reports that his back pain has fully resolved since surgery however his left leg pain and weakness has continued.  He reports no left flank preoperatively.  His right leg pain has completely resolved.  He feels that his left leg does not work and he continues to be weak.  He does report some improvement since he's been home.  He denies bowel or bladder dysfunction at this time.  He denies nausea vomiting fever chills.  He continues to work with physical therapy at home.    On physical exam he has weakness and increased tone of the left lower extremity.  He has diminished reflexes left patellar and Achilles.  He walks with a walker. Full strength upper and right lower extremity. Baseline decreased motion left ankle from prior fusion. 2/5 left hip flexion. 4+/5 left knee extension is pain limited. Left EHL baseline. No abnormal movements seen.       X-rays reviewed today show no evidence of hardware failure or  complication.  There is continued straightening of the lumbar spine with loss of lordosis.    At this time the patient unfortunately has developed left leg weakness postoperatively though his pain has improved.  I've recommended him to continue home health and then progress to outpatient physical therapy.  I would recommend an EMG after his 6 week postoperative yomi for further evaluation.  I will see him back in a month for reassessment.  He should continue wearing his brace until his next appointment with x-ray.        Visit Diagnosis:  S/P lumbar spinal fusion  -     Ambulatory Referral to Neurology  -     EMG W/ ULTRASOUND AND NERVE CONDUCTION TEST 2 Extremities; Future; Expected date: 07/12/2018    Abnormal electromyogram (EMG)   -     EMG W/ ULTRASOUND AND NERVE CONDUCTION TEST 2 Extremities; Future; Expected date: 07/12/2018

## 2018-05-01 RX ORDER — CHLORTHALIDONE 25 MG/1
TABLET ORAL
Qty: 90 TABLET | Refills: 0 | Status: SHIPPED | OUTPATIENT
Start: 2018-05-01 | End: 2018-07-24 | Stop reason: SDUPTHER

## 2018-05-16 ENCOUNTER — TELEPHONE (OUTPATIENT)
Dept: NEUROSURGERY | Facility: CLINIC | Age: 68
End: 2018-05-16

## 2018-05-16 NOTE — TELEPHONE ENCOUNTER
----- Message from Krystle Moya LPN sent at 5/16/2018 11:40 AM CDT -----  Pt called stating the pain in his right leg is back as bad as prior to surgery. He is requesting an appointment. Please advise.

## 2018-05-16 NOTE — TELEPHONE ENCOUNTER
Called patient to inform him that we would be sending his EMG referral to Dr. Joyner in order to move it up to an earlier date, and that we would bring him in for follow up right after. Instructed patient to contact our office after Dr. Hampton office called him with an appointment. Patient verbalized understanding.

## 2018-05-31 ENCOUNTER — HOSPITAL ENCOUNTER (OUTPATIENT)
Dept: RADIOLOGY | Facility: HOSPITAL | Age: 68
Discharge: HOME | End: 2018-05-31
Attending: NEUROLOGICAL SURGERY
Payer: MEDICARE

## 2018-05-31 ENCOUNTER — TELEPHONE (OUTPATIENT)
Dept: VASCULAR SURGERY | Facility: CLINIC | Age: 68
End: 2018-05-31

## 2018-05-31 ENCOUNTER — OFFICE VISIT (OUTPATIENT)
Dept: NEUROSURGERY | Facility: CLINIC | Age: 68
End: 2018-05-31
Payer: MEDICARE

## 2018-05-31 VITALS
SYSTOLIC BLOOD PRESSURE: 133 MMHG | HEIGHT: 67 IN | BODY MASS INDEX: 40.34 KG/M2 | WEIGHT: 257 LBS | DIASTOLIC BLOOD PRESSURE: 77 MMHG | HEART RATE: 79 BPM

## 2018-05-31 DIAGNOSIS — G62.9 PERIPHERAL POLYNEUROPATHY: ICD-10-CM

## 2018-05-31 DIAGNOSIS — Z98.1 S/P LUMBAR SPINAL FUSION: ICD-10-CM

## 2018-05-31 DIAGNOSIS — E66.01 MORBID OBESITY DUE TO EXCESS CALORIES: Primary | ICD-10-CM

## 2018-05-31 DIAGNOSIS — R29.898 LEFT LEG WEAKNESS: ICD-10-CM

## 2018-05-31 DIAGNOSIS — M21.962 LEFT ANKLE JOINT DEFORMITY: ICD-10-CM

## 2018-05-31 DIAGNOSIS — I73.9 PAD (PERIPHERAL ARTERY DISEASE): ICD-10-CM

## 2018-05-31 DIAGNOSIS — G62.9 PERIPHERAL POLYNEUROPATHY: Primary | ICD-10-CM

## 2018-05-31 DIAGNOSIS — G57.30 COMMON PERONEAL NEUROPATHY, UNSPECIFIED LATERALITY: ICD-10-CM

## 2018-05-31 PROCEDURE — 72114 X-RAY EXAM L-S SPINE BENDING: CPT | Mod: TC,FY,PO

## 2018-05-31 PROCEDURE — 99499 UNLISTED E&M SERVICE: CPT | Mod: S$PBB,,, | Performed by: NEUROLOGICAL SURGERY

## 2018-05-31 PROCEDURE — 72114 X-RAY EXAM L-S SPINE BENDING: CPT | Mod: 26,,, | Performed by: RADIOLOGY

## 2018-05-31 PROCEDURE — 99024 POSTOP FOLLOW-UP VISIT: CPT | Mod: S$GLB,,, | Performed by: NEUROLOGICAL SURGERY

## 2018-05-31 PROCEDURE — 99999 PR PBB SHADOW E&M-EST. PATIENT-LVL III: CPT | Mod: PBBFAC,,, | Performed by: NEUROLOGICAL SURGERY

## 2018-05-31 NOTE — TELEPHONE ENCOUNTER
----- Message from Krissy Brewer LPN sent at 5/31/2018 12:21 PM CDT -----  Hello! I'm trying to schedule this patient with Dr. Bird per referral from Dr. Camp but I'm not able to. He is scheduled for a CT angiogram runoff shubham LE for PAD at [facility] on Monday.

## 2018-06-21 ENCOUNTER — OFFICE VISIT (OUTPATIENT)
Dept: VASCULAR SURGERY | Facility: CLINIC | Age: 68
End: 2018-06-21
Payer: MEDICARE

## 2018-06-21 VITALS
DIASTOLIC BLOOD PRESSURE: 67 MMHG | BODY MASS INDEX: 40.48 KG/M2 | HEART RATE: 85 BPM | SYSTOLIC BLOOD PRESSURE: 126 MMHG | WEIGHT: 257.94 LBS | HEIGHT: 67 IN

## 2018-06-21 DIAGNOSIS — I73.9 PAD (PERIPHERAL ARTERY DISEASE): Primary | ICD-10-CM

## 2018-06-21 PROCEDURE — 99999 PR PBB SHADOW E&M-EST. PATIENT-LVL III: CPT | Mod: PBBFAC,,, | Performed by: THORACIC SURGERY (CARDIOTHORACIC VASCULAR SURGERY)

## 2018-06-21 PROCEDURE — 3074F SYST BP LT 130 MM HG: CPT | Mod: CPTII,S$GLB,, | Performed by: THORACIC SURGERY (CARDIOTHORACIC VASCULAR SURGERY)

## 2018-06-21 PROCEDURE — 3008F BODY MASS INDEX DOCD: CPT | Mod: CPTII,S$GLB,, | Performed by: THORACIC SURGERY (CARDIOTHORACIC VASCULAR SURGERY)

## 2018-06-21 PROCEDURE — 99499 UNLISTED E&M SERVICE: CPT | Mod: S$PBB,,, | Performed by: THORACIC SURGERY (CARDIOTHORACIC VASCULAR SURGERY)

## 2018-06-21 PROCEDURE — 99215 OFFICE O/P EST HI 40 MIN: CPT | Mod: S$GLB,,, | Performed by: THORACIC SURGERY (CARDIOTHORACIC VASCULAR SURGERY)

## 2018-06-21 PROCEDURE — 3078F DIAST BP <80 MM HG: CPT | Mod: CPTII,S$GLB,, | Performed by: THORACIC SURGERY (CARDIOTHORACIC VASCULAR SURGERY)

## 2018-06-21 NOTE — PROGRESS NOTES
OFFICE VISIT NOTE    I was asked to see this patient by Dr. Camp.  I saw this patient back in 2016   for peripheral arterial occlusive disease.  The patient has a longstanding   history of back problems and was diagnosed with reflex sympathetic dystrophy of   the left lower extremity.  He has chronic atrophy of the muscles of the left   lower extremity.  The patient came to see me with pain with walking.  He had   significant peripheral arterial occlusive disease and he underwent angioplasty   and stenting of the right superficial femoral artery, the left common iliac   artery, and the left external iliac arteries.  He also underwent a cutting   balloon angioplasty of the right popliteal artery.  After he had those   procedures done, he did not have any relief of his symptoms.  He continued to   have pain.  The pain is worse in the right thigh and leg.  When he got the pain,   he had to quit walking and sit down for the pain to go away.  If he did not sit   down or lean forward, the pain would not go away.  His symptoms were more   consistent with pain of neurogenic etiology.  The patient underwent back surgery   by Dr. Camp recently.  He states that he got relief of the pain in his right   lower extremity for several weeks, but then it came back and thinks that it is   worse.  Apparently, there was significant scarring from previous back operations   and there was some injury to the L4 nerve root on the left.  The patient does   not have any significant pain in the left lower extremity.  The patient states   that he has difficulty walking and has pain sometimes with just standing.  He   definitely develops pain after walking a few feet.  The pain starts at about the   level of the hip and is circumferential and goes all the way down to the   ankles, but does not involve the feet.  The patient also has problems lifting   the right leg against gravity.  He cannot lift up his right lower extremity if   he is sitting  down.    PAST MEDICAL HISTORY:  Positive for peripheral arterial occlusive disease,   hypertension, cervical stenosis, hyperlipidemia, reflex sympathetic dystrophy of   the left lower extremity, atrophy of the muscles of the left lower extremity,   sacroiliitis especially on the left, reflex sympathetic dystrophy of the left   lower extremity.    PAST SURGICAL HISTORY:  Ankle surgery, appendectomy, cervical fusions,   sympathectomy colonoscopy, placement of nerve stimulator in the low back,   multiple low back surgeries, left carotid stent.  Most recently, the patient   underwent the followin.  L4-L5 laminectomies with medial facetectomies and foraminotomies.  2.  Removal of previous L4-S1 instrumentation with placement of a new L2 to S1   instrumentation and L2-L4 posterolateral fusion.  This was done on 2008.    ALLERGIES:  GLIPIZIDE.    MEDICATIONS:  Tylenol, aspirin, Hygroton, Celexa, Plavix, Neurontin, Cozaar,   Percocet, Zocor.    FAMILY HISTORY:  Coronary artery disease and myocardial infarction.    SOCIAL HISTORY:  The patient quit smoking in .  He smoked 2 packs of   cigarettes per day for 45 years.  Denies alcohol use.    REVISION OF SYSTEMS:  He complains of bilateral lower extremity pains, much   worse on the right than on the left.  The pain starts at the level of the hip   and goes down all the way down to the ankles.  The patient has to quit walking   and sit down or lean forward for the pain to go away.  It does not go away if he   just quits walking and keeps standing up.  He recently had low back surgery and   got relief of his pain in the right lower extremity for several weeks, but then   it came back and thinks that it is worse than prior to the operation.  He has   muscle wasting of his left lower extremity, which is chronic and secondary to   reflex sympathetic dystrophy.  He cannot lift his right lower extremity and   cross his legs while sitting down.  However, he can do this  with his left lower   extremity.  All other systems are reviewed and are negative.    PHYSICAL EXAMINATION:  VITAL SIGNS:  Blood pressure is 126/67, pulse rate 85, weight 257 pounds,   respiratory rate is 18.  GENERAL:  He is awake and alert, in no apparent distress.  HEENT:  Head is normocephalic without any masses, atraumatic.  Pupils are equal,   round and reactive.  Sclerae are anicteric.  NECK:  Supple.  Trachea midline.  No masses.  LUNGS:  Clear.  HEART:  Has a regular rate and rhythm.  ABDOMEN:  Soft and nontender.  No masses.  Bowel sounds are present.  EXTREMITIES:  He has atrophy of the muscles of the left leg.  Bilateral lower   extremities are warm with slightly decreased capillary refill.  NEUROLOGIC:  Awake, alert, and oriented.  He has mild weakness of the left lower   extremity.  He has atrophy of the left lower extremity muscles.  PULSE EXAMINATION:  2+ brachial, 2+ radial, 2+ femoral pulses.  I cannot palpate   popliteal, dorsalis pedis nor posterior tibial pulses in either lower   extremity.    CT angiogram of the abdominal aorta with runoff showed no evidence of any   significant occlusive disease of the abdominal aorta or mesenteric arteries.    There is no aneurysmal dilatation.  The stents in the left iliac arteries are   patent.  There is an approximately 40% stenosis of the right common femoral   artery and about 50% stenosis of the right popliteal artery.  The left   superficial femoral artery is occluded.    IMPRESSION:  1.  Peripheral arterial occlusive disease.  2.  Degenerative disease of the lumbar spine.  3.  Status post lumbar spine operation with facetectomies, foraminotomies, and   instrumentation from L2-S1.  4.  Reflex sympathetic dystrophy of the left lower extremity.  5.  Cervical stenosis of the spine.  6.  Obesity.  7.  Hyperlipidemia.  8.  Hypertension.  9.  Status post angioplasty and stenting of the left common iliac artery.  10.  Status post angioplasty and stenting of  the left external iliac artery.  11.  Status post angioplasty of the right popliteal artery.  12.  Status post placement of spinal cord stimulator.  13.  Status post left carotid stent placement.    RECOMMENDATIONS:  The patient has peripheral arterial occlusive disease, but I   do not think that this is causing the pain in the lower extremities.  He has   patent iliac arteries.  He has a non-hemodynamically significant stenosis of the   right common femoral artery and a 50% stenosis of the right popliteal artery.    His runoff to the foot is via the posterior tibial artery.  The patient's pain   starts at the level of the hips.  A nonhemodynamically significant common   femoral artery stenosis will not cause pain in the hip.  The pain usually starts   one level below the area of significant stenosis or occlusion.  Moreover, the   patient has to quit walking and sit down for the pain to go away.  He also gets   relief with leaning forward.  This is more indicative of a neurogenic problem   since this changes the curvature of the spine and decreases compression of the   nerves.  Intermittent claudication caused by decreased arterial blood supply   goes away in a few minutes after just quitting walking.  The patient does not   have to sit down or lean forward.  This was discussed with the patient back in   2016.  In addition, the patient said he got relief of his right lower extremity   pain right after the operation done a few months ago by Dr. Camp, but it   returned in about 4 weeks after the operation.  At that time, nothing was done   to his arteries to increase blood supply that would then just worsen in four   weeks.  I told the patient that we could go ahead and perform an angioplasty of   the right popliteal artery, but that would not help his pain up in the thigh and   I doubt that it would help anything down in the calf.  Again, the patient does   have significant peripheral arterial occlusive disease at  the level of the right   popliteal and the left superficial femoral artery and left popliteal.  However,   the severe disease at these levels will not cause pain in the hips and proximal   thighs.      NICANOR  dd: 06/21/2018 13:55:58 (CDT)  td: 06/22/2018 01:13:38 (CDT)  Doc ID   #[id number]  Job ID #[id number]    CC:

## 2018-07-13 ENCOUNTER — TELEPHONE (OUTPATIENT)
Dept: NEUROSURGERY | Facility: CLINIC | Age: 68
End: 2018-07-13

## 2018-07-13 ENCOUNTER — OFFICE VISIT (OUTPATIENT)
Dept: FAMILY MEDICINE | Facility: CLINIC | Age: 68
End: 2018-07-13
Payer: MEDICARE

## 2018-07-13 ENCOUNTER — CLINICAL SUPPORT (OUTPATIENT)
Dept: FAMILY MEDICINE | Facility: CLINIC | Age: 68
End: 2018-07-13
Attending: FAMILY MEDICINE
Payer: MEDICARE

## 2018-07-13 VITALS
HEIGHT: 67 IN | DIASTOLIC BLOOD PRESSURE: 82 MMHG | SYSTOLIC BLOOD PRESSURE: 138 MMHG | BODY MASS INDEX: 39.87 KG/M2 | HEIGHT: 67 IN | SYSTOLIC BLOOD PRESSURE: 138 MMHG | HEART RATE: 84 BPM | DIASTOLIC BLOOD PRESSURE: 82 MMHG | WEIGHT: 254 LBS | WEIGHT: 254 LBS | BODY MASS INDEX: 39.87 KG/M2 | HEART RATE: 84 BPM

## 2018-07-13 DIAGNOSIS — Z96.89 S/P INSERTION OF SPINAL CORD STIMULATOR: ICD-10-CM

## 2018-07-13 DIAGNOSIS — Z96.89 SPINAL CORD STIMULATOR STATUS: ICD-10-CM

## 2018-07-13 DIAGNOSIS — E11.49 TYPE 2 DIABETES MELLITUS WITH OTHER NEUROLOGIC COMPLICATION, WITHOUT LONG-TERM CURRENT USE OF INSULIN: Primary | ICD-10-CM

## 2018-07-13 DIAGNOSIS — G57.30 COMMON PERONEAL NEUROPATHY, UNSPECIFIED LATERALITY: ICD-10-CM

## 2018-07-13 DIAGNOSIS — Z98.1 S/P CERVICAL SPINAL FUSION: ICD-10-CM

## 2018-07-13 DIAGNOSIS — G90.522 COMPLEX REGIONAL PAIN SYNDROME TYPE 1 OF LEFT LOWER EXTREMITY: ICD-10-CM

## 2018-07-13 DIAGNOSIS — I73.9 PAD (PERIPHERAL ARTERY DISEASE): ICD-10-CM

## 2018-07-13 DIAGNOSIS — R73.9 HYPERGLYCEMIA: ICD-10-CM

## 2018-07-13 DIAGNOSIS — E66.01 MORBID OBESITY DUE TO EXCESS CALORIES: ICD-10-CM

## 2018-07-13 DIAGNOSIS — M21.962 LEFT ANKLE JOINT DEFORMITY: ICD-10-CM

## 2018-07-13 DIAGNOSIS — Z00.00 ENCOUNTER FOR PREVENTIVE HEALTH EXAMINATION: Primary | ICD-10-CM

## 2018-07-13 DIAGNOSIS — I10 ESSENTIAL HYPERTENSION: ICD-10-CM

## 2018-07-13 DIAGNOSIS — E11.49 TYPE 2 DIABETES MELLITUS WITH OTHER NEUROLOGIC COMPLICATION, WITHOUT LONG-TERM CURRENT USE OF INSULIN: ICD-10-CM

## 2018-07-13 DIAGNOSIS — E78.2 MIXED HYPERLIPIDEMIA: ICD-10-CM

## 2018-07-13 DIAGNOSIS — M48.062 LUMBAR STENOSIS WITH NEUROGENIC CLAUDICATION: ICD-10-CM

## 2018-07-13 DIAGNOSIS — G62.9 PERIPHERAL POLYNEUROPATHY: ICD-10-CM

## 2018-07-13 DIAGNOSIS — M47.816 LUMBAR FACET ARTHROPATHY: ICD-10-CM

## 2018-07-13 DIAGNOSIS — Z98.1 S/P LUMBAR SPINAL FUSION: ICD-10-CM

## 2018-07-13 DIAGNOSIS — R29.898 LEFT LEG WEAKNESS: ICD-10-CM

## 2018-07-13 DIAGNOSIS — M48.02 CERVICAL STENOSIS OF SPINE: ICD-10-CM

## 2018-07-13 DIAGNOSIS — M17.0 OSTEOARTHRITIS OF BOTH KNEES, UNSPECIFIED OSTEOARTHRITIS TYPE: ICD-10-CM

## 2018-07-13 PROBLEM — J96.01 ACUTE RESPIRATORY FAILURE WITH HYPOXIA: Status: RESOLVED | Noted: 2018-03-07 | Resolved: 2018-07-13

## 2018-07-13 PROBLEM — M25.562 ACUTE PAIN OF LEFT KNEE: Status: RESOLVED | Noted: 2018-03-13 | Resolved: 2018-07-13

## 2018-07-13 PROCEDURE — 3008F BODY MASS INDEX DOCD: CPT | Mod: CPTII,S$GLB,, | Performed by: FAMILY MEDICINE

## 2018-07-13 PROCEDURE — 99214 OFFICE O/P EST MOD 30 MIN: CPT | Mod: S$GLB,,, | Performed by: FAMILY MEDICINE

## 2018-07-13 PROCEDURE — G0439 PPPS, SUBSEQ VISIT: HCPCS | Mod: S$GLB,,, | Performed by: NURSE PRACTITIONER

## 2018-07-13 PROCEDURE — 3045F PR MOST RECENT HEMOGLOBIN A1C LEVEL 7.0-9.0%: CPT | Mod: CPTII,S$GLB,, | Performed by: FAMILY MEDICINE

## 2018-07-13 PROCEDURE — 99499 UNLISTED E&M SERVICE: CPT | Mod: S$GLB,,, | Performed by: NURSE PRACTITIONER

## 2018-07-13 PROCEDURE — 3079F DIAST BP 80-89 MM HG: CPT | Mod: CPTII,S$GLB,, | Performed by: FAMILY MEDICINE

## 2018-07-13 PROCEDURE — 3075F SYST BP GE 130 - 139MM HG: CPT | Mod: CPTII,S$GLB,, | Performed by: NURSE PRACTITIONER

## 2018-07-13 PROCEDURE — 3075F SYST BP GE 130 - 139MM HG: CPT | Mod: CPTII,S$GLB,, | Performed by: FAMILY MEDICINE

## 2018-07-13 PROCEDURE — 99999 PR PBB SHADOW E&M-EST. PATIENT-LVL IV: CPT | Mod: PBBFAC,,, | Performed by: NURSE PRACTITIONER

## 2018-07-13 PROCEDURE — 99999 PR PBB SHADOW E&M-EST. PATIENT-LVL III: CPT | Mod: PBBFAC,,, | Performed by: FAMILY MEDICINE

## 2018-07-13 PROCEDURE — 3045F PR MOST RECENT HEMOGLOBIN A1C LEVEL 7.0-9.0%: CPT | Mod: CPTII,S$GLB,, | Performed by: NURSE PRACTITIONER

## 2018-07-13 PROCEDURE — 99999 PR PBB SHADOW E&M-EST. PATIENT-LVL III: CPT | Mod: PBBFAC,,,

## 2018-07-13 PROCEDURE — 3079F DIAST BP 80-89 MM HG: CPT | Mod: CPTII,S$GLB,, | Performed by: NURSE PRACTITIONER

## 2018-07-13 RX ORDER — METFORMIN HYDROCHLORIDE 500 MG/1
500 TABLET, EXTENDED RELEASE ORAL
Qty: 90 TABLET | Refills: 3 | Status: SHIPPED | OUTPATIENT
Start: 2018-07-13 | End: 2018-10-01 | Stop reason: SDUPTHER

## 2018-07-13 NOTE — PROGRESS NOTES
Domingo Vaca is a 67 y.o. male here for a diabetic eye screening with non-dilated fundus photos per Dr. Iqbal.    Patient cooperative?: yes  Small pupils?: yes  Last eye exam: Not listed     For exam results, see Encounter Report.

## 2018-07-13 NOTE — TELEPHONE ENCOUNTER
Left voice message to complete Oswestry.    ----- Message from Juanita Salinas RN sent at 2/19/2018 11:24 AM CST -----  3 mo fu lumbar fusion

## 2018-07-13 NOTE — TELEPHONE ENCOUNTER
Low Back Pain Disability Questionnaire    1. Pain Intensity:    2 - The pain comes and goes and is moderate.    2. Personal Care (washing, dressing, etc.):    3 - I need some help, but manage most of my personal care.     3. Lifting:    3 - Pain prevents me from lifting heavy weights, but I can manage light to medium weights if they are conveniently positioned.     4. Walking:     3 - Pain prevents me walking more than 1/4 mile.    5. Sittin - I can sit in my favorite chair as long as I like.    6. Standing:     3 - Pain prevents me from standing for more than half an hour.     7. Sleepin - My sleep is slightly disturbed (less than 1 hour sleepless).     8. Sex Life:     0. My sex life is normal and causes no extra pain  1. My sex life is normal but causes some extra pain  2. My sex life is normal but is very painful  3. My sex life is severely restricted by pain  4. My sex life is nearly absent because of pain  5. Pain prevents any sex life at all    9. Social Life:    1 - My social life is normal, but it increases my level of pain.     10. Travellin - I can travel anywhere but it increases my pain.         Patient's Total Score:  36%       Scoring instructions   For each section the total possible score is 5: if the first statement is marked the section score = 0; if the last statement is marked, it = 5. If all 10 sections are completed the score is calculated as follows:     Example: 16 (total scored)   50 (total possible score) x 100 = 32%     If one section is missed or not applicable the score is calculated:   16 (total scored)   45 (total possible score) x 100 = 35.5%   Minimum detectable change (90% confidence): 10% points (change of less than this may be attributable to error in the measurement)       Interpretation of scores 0% to 20%: minimal disability:  The patient can cope with most living activities. Usually no treatment is indicated apart from advice on lifting sitting and  exercise.    21%-40%: moderate disability:  The patient experiences more pain and difficulty with sitting, lifting and standing. Travel and social life are more difficult and they may be disabled from work. Personal care, sexual activity and sleeping are not grossly affected and the patient can usually be managed by conservative means.    41%-60%: severe disability:  Pain remains the main problem in this group but activities of daily living are affected. These patients require a detailed investigation.    61%-80%: crippled:  Back pain impinges on all aspects of the patient's life. Positive intervention is required.    81%-100%:  These patients are either bed-bound or exaggerating their symptoms.

## 2018-07-13 NOTE — PATIENT INSTRUCTIONS
Counseling and Referral of Other Preventative  (Italic type indicates deductible and co-insurance are waived)    Patient Name: Domingo Vaca  Today's Date: 7/13/2018    Health Maintenance       Date Due Completion Date    Eye Exam 10/19/1962 ---    TETANUS VACCINE 10/19/1970 ---    Zoster Vaccine 10/19/2012 ---    Lipid Panel 08/13/2015 8/13/2014    Pneumococcal (65+) (2 of 2 - PPSV23) 10/19/2017 10/16/2015    Influenza Vaccine 08/01/2018 10/9/2017    Hemoglobin A1c 09/10/2018 3/10/2018    Low Dose Statin 07/13/2019 7/13/2018    Foot Exam 07/13/2019 7/13/2018 (Done)    Override on 7/13/2018: Done    Colonoscopy 12/11/2023 12/11/2013 (Done)    Override on 12/11/2013: Done (MD in [place])        No orders of the defined types were placed in this encounter.    The following information is provided to all patients.  This information is to help you find resources for any of the problems found today that may be affecting your health:                Living healthy guide: www.[facility].[place].gov      Understanding Diabetes: www.diabetes.org      Eating healthy: www.cdc.gov/healthyweight      CDC home safety checklist: www.cdc.gov/steadi/patient.html      Agency on Aging: www.goea.[place].gov      Alcoholics anonymous (AA): www.aa.org      Physical Activity: www.reva.nih.gov/bb5dqyq      Tobacco use: www.quitwithusla.org

## 2018-07-13 NOTE — PROGRESS NOTES
Subjective:       Patient ID: Domingo Vaca is a 67 y.o. male    Chief Complaint: Diabetes    HPI  Patient with a poor outcome with right sided leg weakness after neurosurgical procedure.  Increased pain associated with that.    Review of Systems   Constitutional: Positive for activity change. Negative for unexpected weight change.   HENT: Negative for hearing loss, rhinorrhea and trouble swallowing.    Eyes: Negative for discharge and visual disturbance.   Respiratory: Negative for chest tightness and wheezing.    Cardiovascular: Negative for chest pain and palpitations.   Gastrointestinal: Negative for blood in stool, constipation, diarrhea and vomiting.   Endocrine: Negative for polydipsia and polyuria.   Genitourinary: Negative for difficulty urinating, hematuria and urgency.   Musculoskeletal: Positive for arthralgias and joint swelling. Negative for neck pain.   Neurological: Positive for weakness. Negative for headaches.   Psychiatric/Behavioral: Negative for confusion and dysphoric mood.        Objective:   Physical Exam   Constitutional: He is oriented to person, place, and time. He appears well-developed and well-nourished. No distress.   Neurological: He is alert and oriented to person, place, and time.   Vitals reviewed.       Assessment:       1. Type 2 diabetes mellitus with other neurologic complication, without long-term current use of insulin  metFORMIN (GLUCOPHAGE-XR) 500 MG 24 hr tablet    Hemoglobin A1c   2. Lumbar stenosis with neurogenic claudication          Plan:       Type 2 diabetes mellitus with other neurologic complication, without long-term current use of insulin  -     metFORMIN (GLUCOPHAGE-XR) 500 MG 24 hr tablet; Take 1 tablet (500 mg total) by mouth daily with breakfast.  Dispense: 90 tablet; Refill: 3  -     Hemoglobin A1c; Future; Expected date: 10/11/2018  - Diet and exercise education.  - Serial glucose monitoring  - Continue current therapy  - Diabetic Eye Photo  - Check  lipids in 3 months    Lumbar stenosis with neurogenic claudication  - Continue Pain Management  - Discussed [facility]  - Continue current therapy

## 2018-07-13 NOTE — PATIENT INSTRUCTIONS
Domingo Vaca is a 67 y.o. male here for a diabetic eye screening with non-dilated fundus photos per Dr Iqbal.    Patient cooperative?: Yes  Small pupils?: Yes  Last eye exam: not listed     For exam results, see Encounter Report.

## 2018-07-17 NOTE — PROGRESS NOTES
"Domingo Vaca presented for a  Medicare AWV and comprehensive Health Risk Assessment today. The following components were reviewed and updated:    · Medical history  · Family History  · Social history  · Allergies and Current Medications  · Health Risk Assessment  · Health Maintenance  · Care Team     ** See Completed Assessments for Annual Wellness Visit within the encounter summary.**       The following assessments were completed:  · Living Situation  · CAGE  · Depression Screening  · Timed Get Up and Go  · Whisper Test  · Cognitive Function Screening          · Nutrition Screening  · ADL Screening  · PAQ Screening    Vitals:    07/13/18 1337   BP: 138/82   BP Location: Left arm   Patient Position: Sitting   BP Method: Medium (Manual)   Pulse: 84   Weight: 115.2 kg (253 lb 15.5 oz)   Height: 5' 7" (1.702 m)     Body mass index is 39.78 kg/m².  Physical Exam   Constitutional: He is oriented to person, place, and time. He appears well-developed and well-nourished. No distress.   HENT:   Head: Normocephalic.   Eyes: Conjunctivae are normal.   Cardiovascular: Normal heart sounds.    Pulmonary/Chest: Breath sounds normal. No respiratory distress.   Neurological: He is alert and oriented to person, place, and time.   Vitals reviewed.        Diagnoses and health risks identified today and associated recommendations/orders:    1. Encounter for preventive health examination  Reviewed health maintenance and provided recommendations    Written rx for shingrix and ppsv23     2. Essential hypertension  Stable.     Followed by Marty Iqbal MD .      3. Mixed hyperlipidemia  Continue to monitor   Followed by Marty Iqbal MD .      4. PAD (peripheral artery disease)  Continue to monitor   Followed by Dina.      5. Cervical stenosis of spine  Continue to monitor   Followed by Marty Iqbal MD .      6. Peripheral polyneuropathy  Continue to monitor   Followed by Marty Iqbal MD .      7. Spinal cord " stimulator status  Continue to monitor   Followed by Zoë.      8. Common peroneal neuropathy, unspecified laterality  Continue to monitor   Followed by Zoë.      9. Lumbar stenosis with neurogenic claudication  Continue to monitor   Followed by Zoë.      10. Adjacent segment disease with spinal stenosis  Continue to monitor   Followed by zoë.      11. S/P cervical spinal fusion  Continue to monitor   Followed by Zoë.      12. Complex regional pain syndrome type 1 of left lower extremity  Continue to monitor   Followed by Zoë.      13. S/P insertion of spinal cord stimulator  Stable.     Followed by Zoë.      14. Morbid obesity due to excess calories [E66.01]  Encourage healthy food choices  Followed by Marty Iqbal MD .      15. Type 2 diabetes mellitus with other neurologic complication, without long-term current use of insulin  Continue to monitor   Followed by Marty Iqbal MD .      16. Hyperglycemia  Last a1c  7.2  Followed by Marty Iqbal MD .      17. Osteoarthritis of both knees, unspecified osteoarthritis type  Continue to monitor   Followed by Marty Iqbal MD .      18. Lumbar facet arthropathy  Continue to monitor   Followed by Marty Iqbal MD .      19. S/P lumbar spinal fusion  Stable.     Followed by Marty Iqbal MD .      20. Left ankle joint deformity  Continue to monitor   Followed by Marty Iqbal MD .      21. Left leg weakness  Stable.     Followed by Marty Iqbal MD .        Provided Domingo with a 5-10 year written screening schedule and personal prevention plan. Recommendations were developed using the USPSTF age appropriate recommendations. Education, counseling, and referrals were provided as needed. After Visit Summary printed and given to patient which includes a list of additional screenings\tests needed.    Follow-up in about 1 year (around 7/13/2019).    Queta Abad NP

## 2018-07-24 RX ORDER — CHLORTHALIDONE 25 MG/1
TABLET ORAL
Qty: 90 TABLET | Refills: 0 | OUTPATIENT
Start: 2018-07-24

## 2018-07-24 RX ORDER — CHLORTHALIDONE 25 MG/1
25 TABLET ORAL DAILY
Qty: 90 TABLET | Refills: 3 | Status: SHIPPED | OUTPATIENT
Start: 2018-07-24 | End: 2018-08-08 | Stop reason: SDUPTHER

## 2018-08-08 ENCOUNTER — TELEPHONE (OUTPATIENT)
Dept: FAMILY MEDICINE | Facility: CLINIC | Age: 68
End: 2018-08-08

## 2018-08-08 DIAGNOSIS — I10 ESSENTIAL HYPERTENSION: ICD-10-CM

## 2018-08-08 RX ORDER — CHLORTHALIDONE 25 MG/1
TABLET ORAL
Qty: 90 TABLET | Refills: 0 | Status: SHIPPED | OUTPATIENT
Start: 2018-08-08 | End: 2018-10-01 | Stop reason: SDUPTHER

## 2018-08-08 NOTE — TELEPHONE ENCOUNTER
----- Message from Agustin Dowling sent at 8/8/2018  9:36 AM CDT -----  Type: Needs Medical Advice    Who Called:  Patient  Best Call Back Number: [phone]  Additional Information: Patient would like to talk to nurse about prescription - please call to advise.

## 2018-08-08 NOTE — TELEPHONE ENCOUNTER
Spoke to pt. He was inquiring about rx for chlorthalidone 25mg. Advised that med was recently refilled on 7/24 pt states that rx was supposed to be sent [facility] Pt states that he will have [facility] send it over to [facility].

## 2018-08-10 RX ORDER — LOSARTAN POTASSIUM 100 MG/1
TABLET ORAL
Qty: 90 TABLET | Refills: 1 | OUTPATIENT
Start: 2018-08-10

## 2018-08-10 RX ORDER — CLOPIDOGREL BISULFATE 75 MG/1
TABLET ORAL
Qty: 90 TABLET | Refills: 1 | OUTPATIENT
Start: 2018-08-10

## 2018-09-04 RX ORDER — CHLORTHALIDONE 25 MG/1
TABLET ORAL
Qty: 90 TABLET | Refills: 0 | OUTPATIENT
Start: 2018-09-04

## 2018-09-21 ENCOUNTER — TELEPHONE (OUTPATIENT)
Dept: FAMILY MEDICINE | Facility: CLINIC | Age: 68
End: 2018-09-21

## 2018-09-21 NOTE — TELEPHONE ENCOUNTER
----- Message from Emily Mann sent at 9/21/2018  9:42 AM CDT -----  Contact: Self  Patient is requesting a call back from the nurse in regards to some of his medicines.  Please call back at [phone] (home).  Thanks

## 2018-09-24 ENCOUNTER — TELEPHONE (OUTPATIENT)
Dept: FAMILY MEDICINE | Facility: CLINIC | Age: 68
End: 2018-09-24

## 2018-09-24 NOTE — TELEPHONE ENCOUNTER
----- Message from Jazmin Grant sent at 9/24/2018 10:43 AM CDT -----  Contact: Patient  Patient would like to speak with someone regarding his medications.  Call Back#[phone]  Thanks

## 2018-09-25 ENCOUNTER — TELEPHONE (OUTPATIENT)
Dept: FAMILY MEDICINE | Facility: CLINIC | Age: 68
End: 2018-09-25

## 2018-09-25 NOTE — TELEPHONE ENCOUNTER
----- Message from Krys Pineda sent at 9/25/2018 10:58 AM CDT -----  Contact: [phone]  Pt is calling to speak with the nurse concerning his [employer] prescriptions.  Pt would like to discuss with nurse      Thank you!

## 2018-09-29 DIAGNOSIS — I10 ESSENTIAL HYPERTENSION: ICD-10-CM

## 2018-10-01 DIAGNOSIS — I10 ESSENTIAL HYPERTENSION: ICD-10-CM

## 2018-10-01 DIAGNOSIS — E11.49 TYPE 2 DIABETES MELLITUS WITH OTHER NEUROLOGIC COMPLICATION, WITHOUT LONG-TERM CURRENT USE OF INSULIN: ICD-10-CM

## 2018-10-01 RX ORDER — LOSARTAN POTASSIUM 100 MG/1
100 TABLET ORAL DAILY
Qty: 90 TABLET | Refills: 3 | Status: SHIPPED | OUTPATIENT
Start: 2018-10-01 | End: 2018-10-15 | Stop reason: ALTCHOICE

## 2018-10-01 RX ORDER — SIMVASTATIN 40 MG/1
TABLET, FILM COATED ORAL
Qty: 90 TABLET | Refills: 3 | Status: SHIPPED | OUTPATIENT
Start: 2018-10-01 | End: 2018-10-01 | Stop reason: SDUPTHER

## 2018-10-01 RX ORDER — CHLORTHALIDONE 25 MG/1
25 TABLET ORAL DAILY
Qty: 90 TABLET | Refills: 3 | Status: SHIPPED | OUTPATIENT
Start: 2018-10-01 | End: 2019-01-09 | Stop reason: SDUPTHER

## 2018-10-01 RX ORDER — METFORMIN HYDROCHLORIDE 500 MG/1
500 TABLET, EXTENDED RELEASE ORAL
Qty: 90 TABLET | Refills: 3 | Status: SHIPPED | OUTPATIENT
Start: 2018-10-01 | End: 2019-01-11 | Stop reason: SDUPTHER

## 2018-10-01 RX ORDER — SIMVASTATIN 40 MG/1
40 TABLET, FILM COATED ORAL NIGHTLY
Qty: 90 TABLET | Refills: 3 | Status: SHIPPED | OUTPATIENT
Start: 2018-10-01 | End: 2019-01-09 | Stop reason: SDUPTHER

## 2018-10-01 RX ORDER — LOSARTAN POTASSIUM 100 MG/1
TABLET ORAL
Qty: 90 TABLET | Refills: 3 | Status: SHIPPED | OUTPATIENT
Start: 2018-10-01 | End: 2018-10-01 | Stop reason: SDUPTHER

## 2018-10-01 RX ORDER — CITALOPRAM 20 MG/1
20 TABLET, FILM COATED ORAL DAILY
Qty: 90 TABLET | Refills: 3 | Status: SHIPPED | OUTPATIENT
Start: 2018-10-01 | End: 2019-01-09 | Stop reason: SDUPTHER

## 2018-10-01 RX ORDER — CITALOPRAM 20 MG/1
TABLET, FILM COATED ORAL
Qty: 90 TABLET | Refills: 0 | Status: SHIPPED | OUTPATIENT
Start: 2018-10-01 | End: 2018-10-01 | Stop reason: SDUPTHER

## 2018-10-01 RX ORDER — CLOPIDOGREL BISULFATE 75 MG/1
75 TABLET ORAL DAILY
Qty: 90 TABLET | Refills: 3 | Status: SHIPPED | OUTPATIENT
Start: 2018-10-01 | End: 2019-01-09 | Stop reason: SDUPTHER

## 2018-10-01 RX ORDER — GABAPENTIN 600 MG/1
1200 TABLET ORAL 3 TIMES DAILY
Qty: 540 TABLET | Refills: 3 | Status: SHIPPED | OUTPATIENT
Start: 2018-10-01 | End: 2019-01-09 | Stop reason: SDUPTHER

## 2018-10-10 ENCOUNTER — LAB VISIT (OUTPATIENT)
Dept: LAB | Facility: HOSPITAL | Age: 68
End: 2018-10-10
Attending: FAMILY MEDICINE
Payer: MEDICARE

## 2018-10-10 DIAGNOSIS — E11.49 TYPE 2 DIABETES MELLITUS WITH OTHER NEUROLOGIC COMPLICATION, WITHOUT LONG-TERM CURRENT USE OF INSULIN: ICD-10-CM

## 2018-10-10 LAB
CHOLEST SERPL-MCNC: 115 MG/DL
CHOLEST/HDLC SERPL: 2.9 {RATIO}
HDLC SERPL-MCNC: 40 MG/DL
HDLC SERPL: 34.8 %
LDLC SERPL CALC-MCNC: 47.2 MG/DL
NONHDLC SERPL-MCNC: 75 MG/DL
TRIGL SERPL-MCNC: 139 MG/DL

## 2018-10-10 PROCEDURE — 36415 COLL VENOUS BLD VENIPUNCTURE: CPT | Mod: PO

## 2018-10-10 PROCEDURE — 80061 LIPID PANEL: CPT

## 2018-10-10 PROCEDURE — 83036 HEMOGLOBIN GLYCOSYLATED A1C: CPT

## 2018-10-11 LAB
ESTIMATED AVG GLUCOSE: 137 MG/DL
HBA1C MFR BLD HPLC: 6.4 %

## 2018-10-15 ENCOUNTER — IMMUNIZATION (OUTPATIENT)
Dept: PHARMACY | Facility: CLINIC | Age: 68
End: 2018-10-15
Payer: MEDICARE

## 2018-10-15 ENCOUNTER — OFFICE VISIT (OUTPATIENT)
Dept: FAMILY MEDICINE | Facility: CLINIC | Age: 68
End: 2018-10-15
Payer: MEDICARE

## 2018-10-15 VITALS
DIASTOLIC BLOOD PRESSURE: 84 MMHG | HEART RATE: 68 BPM | HEIGHT: 67 IN | WEIGHT: 260.38 LBS | SYSTOLIC BLOOD PRESSURE: 152 MMHG | BODY MASS INDEX: 40.87 KG/M2

## 2018-10-15 DIAGNOSIS — M48.062 LUMBAR STENOSIS WITH NEUROGENIC CLAUDICATION: ICD-10-CM

## 2018-10-15 DIAGNOSIS — E11.9 DIABETES MELLITUS WITHOUT COMPLICATION: Primary | ICD-10-CM

## 2018-10-15 DIAGNOSIS — I10 ESSENTIAL HYPERTENSION: ICD-10-CM

## 2018-10-15 PROCEDURE — 99213 OFFICE O/P EST LOW 20 MIN: CPT | Mod: PBBFAC,PO | Performed by: FAMILY MEDICINE

## 2018-10-15 PROCEDURE — 3079F DIAST BP 80-89 MM HG: CPT | Mod: CPTII,,, | Performed by: FAMILY MEDICINE

## 2018-10-15 PROCEDURE — 3044F HG A1C LEVEL LT 7.0%: CPT | Mod: CPTII,,, | Performed by: FAMILY MEDICINE

## 2018-10-15 PROCEDURE — 99499 UNLISTED E&M SERVICE: CPT | Mod: S$GLB,,, | Performed by: FAMILY MEDICINE

## 2018-10-15 PROCEDURE — 99214 OFFICE O/P EST MOD 30 MIN: CPT | Mod: S$PBB,,, | Performed by: FAMILY MEDICINE

## 2018-10-15 PROCEDURE — 99999 PR PBB SHADOW E&M-EST. PATIENT-LVL III: CPT | Mod: PBBFAC,,, | Performed by: FAMILY MEDICINE

## 2018-10-15 PROCEDURE — 3077F SYST BP >= 140 MM HG: CPT | Mod: CPTII,,, | Performed by: FAMILY MEDICINE

## 2018-10-15 PROCEDURE — 1101F PT FALLS ASSESS-DOCD LE1/YR: CPT | Mod: CPTII,,, | Performed by: FAMILY MEDICINE

## 2018-10-15 PROCEDURE — 3008F BODY MASS INDEX DOCD: CPT | Mod: CPTII,,, | Performed by: FAMILY MEDICINE

## 2018-10-15 RX ORDER — OLMESARTAN MEDOXOMIL 40 MG/1
40 TABLET ORAL DAILY
Qty: 90 TABLET | Refills: 3 | Status: SHIPPED | OUTPATIENT
Start: 2018-10-15 | End: 2019-01-09 | Stop reason: SDUPTHER

## 2018-10-15 NOTE — PROGRESS NOTES
Subjective:       Patient ID: Domingo Vaca is a 67 y.o. male    Chief Complaint: Diabetes (follow up; hm due: tetanus, zoster, flu)    Diabetes   He presents for his follow-up diabetic visit. He has type 2 diabetes mellitus. His disease course has been stable. There are no hypoglycemic associated symptoms. Pertinent negatives for hypoglycemia include no confusion or headaches. There are no diabetic associated symptoms. Pertinent negatives for diabetes include no chest pain, no polydipsia and no polyuria. There are no hypoglycemic complications. Symptoms are stable. There are no diabetic complications. Current diabetic treatment includes oral agent (monotherapy). He is compliant with treatment all of the time. An ACE inhibitor/angiotensin II receptor blocker is being taken. Eye exam is current.       Review of Systems   Constitutional: Negative for activity change and unexpected weight change.   HENT: Negative for hearing loss, rhinorrhea and trouble swallowing.    Eyes: Negative for discharge and visual disturbance.   Respiratory: Negative for chest tightness and wheezing.    Cardiovascular: Negative for chest pain and palpitations.   Gastrointestinal: Negative for blood in stool, constipation, diarrhea and vomiting.   Endocrine: Negative for polydipsia and polyuria.   Genitourinary: Negative for difficulty urinating, hematuria and urgency.   Musculoskeletal: Positive for arthralgias and joint swelling. Negative for neck pain.   Neurological: Negative for headaches.   Psychiatric/Behavioral: Negative for confusion and dysphoric mood.        Objective:   Physical Exam   Constitutional: He is oriented to person, place, and time. He appears well-developed and well-nourished. No distress.   Neurological: He is alert and oriented to person, place, and time.   Vitals reviewed.    Results for orders placed or performed in visit on 10/10/18   Hemoglobin A1c   Result Value Ref Range    Hemoglobin A1C 6.4 (H) 4.0 - 5.6 %     Estimated Avg Glucose 137 (H) 68 - 131 mg/dL   Lipid panel   Result Value Ref Range    Cholesterol 115 (L) 120 - 199 mg/dL    Triglycerides 139 30 - 150 mg/dL    HDL 40 40 - 75 mg/dL    LDL Cholesterol 47.2 (L) 63.0 - 159.0 mg/dL    HDL/Chol Ratio 34.8 20.0 - 50.0 %    Total Cholesterol/HDL Ratio 2.9 2.0 - 5.0    Non-HDL Cholesterol 75 mg/dL         Assessment:       1. Diabetes mellitus without complication     2. Lumbar stenosis with neurogenic claudication          Plan:       Diabetes mellitus without complication  - Diet and exercise education.  - Serial glucose monitoring  - Continue current therapy    Lumbar stenosis with neurogenic claudication  - Seeing Pain Management, discussing the addition of intrathecal pump trial    Essential hypertension  -     START olmesartan (BENICAR) 40 MG tablet; Take 1 tablet (40 mg total) by mouth once daily.  Dispense: 90 tablet; Refill: 3  - STOP Losartan  - Continue current therapy  - Serial blood pressure monitoring  - Diet and exercise education.   - Recheck in weeks for blood pressure check

## 2018-10-17 ENCOUNTER — TELEPHONE (OUTPATIENT)
Dept: FAMILY MEDICINE | Facility: CLINIC | Age: 68
End: 2018-10-17

## 2018-10-17 NOTE — TELEPHONE ENCOUNTER
----- Message from Lucretia Finn sent at 10/17/2018 11:47 AM CDT -----  Contact: Self  Patient needs to speak to the nurse about increasing the dose of medication gabapentin (NEURONTIN) 600 MG tablet    Please call to advise [phone]

## 2018-10-18 ENCOUNTER — PATIENT MESSAGE (OUTPATIENT)
Dept: FAMILY MEDICINE | Facility: CLINIC | Age: 68
End: 2018-10-18

## 2018-10-18 RX ORDER — DULOXETIN HYDROCHLORIDE 30 MG/1
30 CAPSULE, DELAYED RELEASE ORAL DAILY
Qty: 90 CAPSULE | Refills: 3 | Status: SHIPPED | OUTPATIENT
Start: 2018-10-18 | End: 2019-03-28 | Stop reason: SDUPTHER

## 2018-10-23 ENCOUNTER — PATIENT MESSAGE (OUTPATIENT)
Dept: FAMILY MEDICINE | Facility: CLINIC | Age: 68
End: 2018-10-23

## 2018-11-02 ENCOUNTER — TELEPHONE (OUTPATIENT)
Dept: FAMILY MEDICINE | Facility: CLINIC | Age: 68
End: 2018-11-02

## 2018-11-02 NOTE — TELEPHONE ENCOUNTER
Phoned Heather with the [facility] office to instruct that the paper work for the request was faxed this AM. LMOR to call [facility]# if they did not receive the fax. Filed in faxed file folder. CLC

## 2018-12-13 ENCOUNTER — TELEPHONE (OUTPATIENT)
Dept: FAMILY MEDICINE | Facility: CLINIC | Age: 68
End: 2018-12-13

## 2018-12-13 ENCOUNTER — PATIENT MESSAGE (OUTPATIENT)
Dept: FAMILY MEDICINE | Facility: CLINIC | Age: 68
End: 2018-12-13

## 2018-12-13 NOTE — TELEPHONE ENCOUNTER
----- Message from Angie Pineda sent at 12/13/2018  4:08 PM CST -----  Contact: Heather with Dr Orantes ph# [phone]   Heather with Dr Orantes ph# [phone]   Requesting a call from nurse in regards to plavix, patient need to be off 7 days, see returned fax

## 2018-12-13 NOTE — TELEPHONE ENCOUNTER
----- Message from Karina Joshua sent at 12/13/2018 12:25 PM CST -----  Contact: patient  Type: Needs Medical Advice    Who Called:  Patient  Best Call Back Number: [phone]  Additional Information: stopping medication for surgery would like a nurse to please call him today. thanks

## 2018-12-14 ENCOUNTER — PATIENT MESSAGE (OUTPATIENT)
Dept: FAMILY MEDICINE | Facility: CLINIC | Age: 68
End: 2018-12-14

## 2018-12-14 NOTE — TELEPHONE ENCOUNTER
Attempted to contact Heather at Dr. Orantes's office. Unable to get someone on the phone and unable to leave message for staff.

## 2019-01-09 ENCOUNTER — PATIENT MESSAGE (OUTPATIENT)
Dept: FAMILY MEDICINE | Facility: CLINIC | Age: 69
End: 2019-01-09

## 2019-01-09 DIAGNOSIS — I73.9 PAD (PERIPHERAL ARTERY DISEASE): ICD-10-CM

## 2019-01-09 DIAGNOSIS — E78.2 MIXED HYPERLIPIDEMIA: ICD-10-CM

## 2019-01-09 DIAGNOSIS — I10 ESSENTIAL HYPERTENSION: ICD-10-CM

## 2019-01-09 DIAGNOSIS — G62.9 PERIPHERAL POLYNEUROPATHY: Primary | ICD-10-CM

## 2019-01-10 RX ORDER — OLMESARTAN MEDOXOMIL 40 MG/1
40 TABLET ORAL DAILY
Qty: 90 TABLET | Refills: 3 | Status: SHIPPED | OUTPATIENT
Start: 2019-01-10 | End: 2019-12-27

## 2019-01-10 RX ORDER — CHLORTHALIDONE 25 MG/1
25 TABLET ORAL DAILY
Qty: 90 TABLET | Refills: 3 | Status: ON HOLD | OUTPATIENT
Start: 2019-01-10 | End: 2019-03-03 | Stop reason: HOSPADM

## 2019-01-10 RX ORDER — CITALOPRAM 20 MG/1
20 TABLET, FILM COATED ORAL DAILY
Qty: 90 TABLET | Refills: 3 | Status: SHIPPED | OUTPATIENT
Start: 2019-01-10 | End: 2019-10-23

## 2019-01-10 RX ORDER — GABAPENTIN 600 MG/1
1200 TABLET ORAL 3 TIMES DAILY
Qty: 540 TABLET | Refills: 3 | Status: SHIPPED | OUTPATIENT
Start: 2019-01-10 | End: 2019-04-15

## 2019-01-10 RX ORDER — SIMVASTATIN 40 MG/1
40 TABLET, FILM COATED ORAL NIGHTLY
Qty: 90 TABLET | Refills: 3 | Status: SHIPPED | OUTPATIENT
Start: 2019-01-10 | End: 2019-12-27

## 2019-01-10 RX ORDER — CLOPIDOGREL BISULFATE 75 MG/1
75 TABLET ORAL DAILY
Qty: 90 TABLET | Refills: 3 | Status: SHIPPED | OUTPATIENT
Start: 2019-01-10 | End: 2019-12-27

## 2019-01-11 ENCOUNTER — PATIENT MESSAGE (OUTPATIENT)
Dept: FAMILY MEDICINE | Facility: CLINIC | Age: 69
End: 2019-01-11

## 2019-01-11 DIAGNOSIS — E11.49 TYPE 2 DIABETES MELLITUS WITH OTHER NEUROLOGIC COMPLICATION, WITHOUT LONG-TERM CURRENT USE OF INSULIN: ICD-10-CM

## 2019-01-11 RX ORDER — METFORMIN HYDROCHLORIDE 500 MG/1
500 TABLET, EXTENDED RELEASE ORAL
Qty: 90 TABLET | Refills: 3 | Status: CANCELLED | OUTPATIENT
Start: 2019-01-11 | End: 2020-01-11

## 2019-01-14 ENCOUNTER — PATIENT MESSAGE (OUTPATIENT)
Dept: FAMILY MEDICINE | Facility: CLINIC | Age: 69
End: 2019-01-14

## 2019-01-14 DIAGNOSIS — E11.49 TYPE 2 DIABETES MELLITUS WITH OTHER NEUROLOGIC COMPLICATION, WITHOUT LONG-TERM CURRENT USE OF INSULIN: ICD-10-CM

## 2019-01-14 RX ORDER — METFORMIN HYDROCHLORIDE 500 MG/1
500 TABLET, EXTENDED RELEASE ORAL
Qty: 90 TABLET | Refills: 2 | Status: ON HOLD | OUTPATIENT
Start: 2019-01-14 | End: 2019-09-30 | Stop reason: SDUPTHER

## 2019-01-14 RX ORDER — METFORMIN HYDROCHLORIDE 500 MG/1
500 TABLET, EXTENDED RELEASE ORAL
Qty: 90 TABLET | Refills: 2 | Status: SHIPPED | OUTPATIENT
Start: 2019-01-14 | End: 2019-01-14 | Stop reason: SDUPTHER

## 2019-01-21 ENCOUNTER — PATIENT MESSAGE (OUTPATIENT)
Dept: NEUROLOGY | Facility: CLINIC | Age: 69
End: 2019-01-21

## 2019-03-01 ENCOUNTER — TELEPHONE (OUTPATIENT)
Dept: UROLOGY | Facility: CLINIC | Age: 69
End: 2019-03-01

## 2019-03-01 PROBLEM — N17.9 AKI (ACUTE KIDNEY INJURY): Status: ACTIVE | Noted: 2019-03-01

## 2019-03-01 PROBLEM — R31.9 HEMATURIA: Status: ACTIVE | Noted: 2019-03-01

## 2019-03-01 NOTE — TELEPHONE ENCOUNTER
----- Message from Meliza Gamez sent at 3/1/2019  7:12 AM CST -----  Contact: Patient  Type:  Same Day Appointment Request    Caller is requesting a same day appointment.  Caller declined first available appointment listed below.      Name of Caller:  Patient  When is the first available appointment?  3/15/19  Symptoms: urinating blood clots/ painful when he urinates  Best Call Back Number:    Additional Information:   Calling to schedule a same day appt today, if possible. He is a new patient. Please advise.

## 2019-03-01 NOTE — TELEPHONE ENCOUNTER
Spoke to pt and advised him to go to the ER. PT is urinating large blood clots for the past 3 days.

## 2019-03-02 PROBLEM — R09.02 HYPOXIA: Status: ACTIVE | Noted: 2019-03-02

## 2019-03-04 ENCOUNTER — TELEPHONE (OUTPATIENT)
Dept: NEPHROLOGY | Facility: CLINIC | Age: 69
End: 2019-03-04

## 2019-03-04 NOTE — TELEPHONE ENCOUNTER
----- Message from Kylie Rodriguez RT sent at 3/4/2019  8:48 AM CST -----  Contact: pt    pt , requesting a [facility] f/u appt, discharge instructions states he  Needs to be seen this Wednesday, please, thanks.

## 2019-03-16 PROBLEM — D72.829 LEUKOCYTOSIS: Status: ACTIVE | Noted: 2019-03-16

## 2019-03-16 PROBLEM — K92.2 ACUTE UPPER GI BLEED: Status: ACTIVE | Noted: 2019-03-16

## 2019-03-16 PROBLEM — D64.9 ANEMIA: Status: ACTIVE | Noted: 2019-03-16

## 2019-03-17 PROBLEM — A41.9 SEPTIC SHOCK: Status: ACTIVE | Noted: 2019-03-16

## 2019-03-17 PROBLEM — R65.21 SEPTIC SHOCK: Status: ACTIVE | Noted: 2019-03-16

## 2019-03-18 DIAGNOSIS — E11.9 TYPE 2 DIABETES MELLITUS WITHOUT COMPLICATION: ICD-10-CM

## 2019-03-20 ENCOUNTER — TELEPHONE (OUTPATIENT)
Dept: NEUROLOGY | Facility: CLINIC | Age: 69
End: 2019-03-20

## 2019-03-20 ENCOUNTER — TELEPHONE (OUTPATIENT)
Dept: FAMILY MEDICINE | Facility: CLINIC | Age: 69
End: 2019-03-20

## 2019-03-20 NOTE — TELEPHONE ENCOUNTER
----- Message from Pradeep Camp sent at 3/20/2019  9:22 AM CDT -----  Needs Advice    Reason for call: Mrs. Vaca wanted to let the staff know the pt was unable to make appt today at 9 AM bc he's in CCU at [facility]        Communication Preference: Mrs. Vaca (wife) @ [phone]    Additional Information:

## 2019-03-20 NOTE — TELEPHONE ENCOUNTER
Returned call and spoke with patient's wife. Patient is currently admitted into CCU at [facility]. They will call back to reschedule once he is discharged. Patient's wife verbalized understanding.

## 2019-03-20 NOTE — TELEPHONE ENCOUNTER
----- Message from Pradeep Camp sent at 3/20/2019  9:24 AM CDT -----  .Needs Advice    Reason for call: Mrs. Vaca wanted to let the staff know pt is in CCU at [facility]        Communication Preference: Mrs. Vaca (wife) @ [phone]    Additional Information:

## 2019-03-23 PROBLEM — K58.0 IRRITABLE BOWEL SYNDROME WITH DIARRHEA: Status: ACTIVE | Noted: 2019-03-23

## 2019-03-26 ENCOUNTER — TELEPHONE (OUTPATIENT)
Dept: FAMILY MEDICINE | Facility: CLINIC | Age: 69
End: 2019-03-26

## 2019-03-26 NOTE — TELEPHONE ENCOUNTER
----- Message from Henry Mcallister sent at 3/26/2019  2:37 PM CDT -----  Contact: Nicole/[facility]   Nicole/[facility] called, they received home health orders and they are calling to confirm if dr will follow home health services and sign home health orders for patient. Please call back at [phone]

## 2019-03-27 NOTE — TELEPHONE ENCOUNTER
----- Message from Ami Valencia sent at 3/27/2019 10:25 AM CDT -----  Contact: Delicia/Nurse [facility]  ..Type: Needs Medical Advice    Who Called: Delicia/[facility]urse [facility]  Best Call Back Number:   Additional Information: Delicia called to inform Dr that pt was discharged from hospital on yesterday and is still currently running a fever 100.2 fever and chills throughout the night according to family, pt was in hospital for (CEPSIS).  Need advise on what to do  Please advise  Thanks

## 2019-03-27 NOTE — TELEPHONE ENCOUNTER
Spoke with Delicia with [facility] and informed her that the pt should return to the ER. She said she instructed the pt and the family to do so, but the pt declined, and she just wanted to make you aware.

## 2019-03-28 ENCOUNTER — PATIENT MESSAGE (OUTPATIENT)
Dept: FAMILY MEDICINE | Facility: CLINIC | Age: 69
End: 2019-03-28

## 2019-03-28 RX ORDER — DULOXETIN HYDROCHLORIDE 30 MG/1
30 CAPSULE, DELAYED RELEASE ORAL DAILY
Qty: 90 CAPSULE | Refills: 3 | Status: SHIPPED | OUTPATIENT
Start: 2019-03-28 | End: 2019-07-19 | Stop reason: SDUPTHER

## 2019-03-28 NOTE — TELEPHONE ENCOUNTER
appt with Leyda Mcclellan cancelled per pt. Pt states that he will discuss kristen valiente with MD at his upcoming appt

## 2019-04-01 ENCOUNTER — TELEPHONE (OUTPATIENT)
Dept: FAMILY MEDICINE | Facility: CLINIC | Age: 69
End: 2019-04-01

## 2019-04-01 NOTE — TELEPHONE ENCOUNTER
Spoke with Debra at [facility] and informed her that Dr. Iqbal will follow the pt on home health. Debra verbalized understanding.

## 2019-04-01 NOTE — TELEPHONE ENCOUNTER
----- Message from Kylie Rodriguez RT sent at 4/1/2019  1:16 PM CDT -----  Contact: pt    MARK Obregon, [phone], requesting confirmation that Dr. Iqbal will follow the pt with home health services, and sign the orders, thanks.

## 2019-04-03 ENCOUNTER — TELEPHONE (OUTPATIENT)
Dept: NEUROLOGY | Facility: CLINIC | Age: 69
End: 2019-04-03

## 2019-04-03 NOTE — TELEPHONE ENCOUNTER
----- Message from Jazmin Grant sent at 4/3/2019 12:51 PM CDT -----  Contact: Patient  Type:  Sooner Apoointment Request    Caller is requesting a sooner appointment.  Caller declined first available appointment listed below.  Caller will not accept being placed on the waitlist and is requesting a message be sent to doctor.    Name of Caller:  Patient   When is the first available appointment?  No appt available  Symptoms:  Pain in both legs due to nerve damage.  Best Call Back Number:    Additional Information:

## 2019-04-04 ENCOUNTER — OFFICE VISIT (OUTPATIENT)
Dept: NEUROLOGY | Facility: CLINIC | Age: 69
End: 2019-04-04
Payer: MEDICARE

## 2019-04-04 VITALS
HEIGHT: 70 IN | DIASTOLIC BLOOD PRESSURE: 68 MMHG | SYSTOLIC BLOOD PRESSURE: 120 MMHG | HEART RATE: 91 BPM | WEIGHT: 260.94 LBS | BODY MASS INDEX: 37.36 KG/M2 | RESPIRATION RATE: 16 BRPM

## 2019-04-04 DIAGNOSIS — G90.522 COMPLEX REGIONAL PAIN SYNDROME TYPE 1 OF LEFT LOWER EXTREMITY: ICD-10-CM

## 2019-04-04 DIAGNOSIS — E11.42 DIABETIC POLYNEUROPATHY ASSOCIATED WITH TYPE 2 DIABETES MELLITUS: ICD-10-CM

## 2019-04-04 DIAGNOSIS — G03.9 ARACHNOIDITIS: ICD-10-CM

## 2019-04-04 DIAGNOSIS — M47.816 LUMBAR FACET ARTHROPATHY: ICD-10-CM

## 2019-04-04 DIAGNOSIS — M48.062 LUMBAR STENOSIS WITH NEUROGENIC CLAUDICATION: Primary | ICD-10-CM

## 2019-04-04 DIAGNOSIS — R29.898 LEFT LEG WEAKNESS: ICD-10-CM

## 2019-04-04 DIAGNOSIS — Z96.89 S/P INSERTION OF SPINAL CORD STIMULATOR: ICD-10-CM

## 2019-04-04 DIAGNOSIS — Z98.1 S/P LUMBAR SPINAL FUSION: ICD-10-CM

## 2019-04-04 PROCEDURE — 99999 PR PBB SHADOW E&M-EST. PATIENT-LVL III: ICD-10-PCS | Mod: PBBFAC,,, | Performed by: PSYCHIATRY & NEUROLOGY

## 2019-04-04 PROCEDURE — 99215 OFFICE O/P EST HI 40 MIN: CPT | Mod: S$GLB,,, | Performed by: PSYCHIATRY & NEUROLOGY

## 2019-04-04 PROCEDURE — 3045F PR MOST RECENT HEMOGLOBIN A1C LEVEL 7.0-9.0%: CPT | Mod: CPTII,S$GLB,, | Performed by: PSYCHIATRY & NEUROLOGY

## 2019-04-04 PROCEDURE — 99215 PR OFFICE/OUTPT VISIT, EST, LEVL V, 40-54 MIN: ICD-10-PCS | Mod: S$GLB,,, | Performed by: PSYCHIATRY & NEUROLOGY

## 2019-04-04 PROCEDURE — 3074F PR MOST RECENT SYSTOLIC BLOOD PRESSURE < 130 MM HG: ICD-10-PCS | Mod: CPTII,S$GLB,, | Performed by: PSYCHIATRY & NEUROLOGY

## 2019-04-04 PROCEDURE — 3074F SYST BP LT 130 MM HG: CPT | Mod: CPTII,S$GLB,, | Performed by: PSYCHIATRY & NEUROLOGY

## 2019-04-04 PROCEDURE — 3078F DIAST BP <80 MM HG: CPT | Mod: CPTII,S$GLB,, | Performed by: PSYCHIATRY & NEUROLOGY

## 2019-04-04 PROCEDURE — 1101F PT FALLS ASSESS-DOCD LE1/YR: CPT | Mod: CPTII,S$GLB,, | Performed by: PSYCHIATRY & NEUROLOGY

## 2019-04-04 PROCEDURE — 99499 UNLISTED E&M SERVICE: CPT | Mod: S$GLB,,, | Performed by: PSYCHIATRY & NEUROLOGY

## 2019-04-04 PROCEDURE — 99999 PR PBB SHADOW E&M-EST. PATIENT-LVL III: CPT | Mod: PBBFAC,,, | Performed by: PSYCHIATRY & NEUROLOGY

## 2019-04-04 PROCEDURE — 3045F PR MOST RECENT HEMOGLOBIN A1C LEVEL 7.0-9.0%: ICD-10-PCS | Mod: CPTII,S$GLB,, | Performed by: PSYCHIATRY & NEUROLOGY

## 2019-04-04 PROCEDURE — 3078F PR MOST RECENT DIASTOLIC BLOOD PRESSURE < 80 MM HG: ICD-10-PCS | Mod: CPTII,S$GLB,, | Performed by: PSYCHIATRY & NEUROLOGY

## 2019-04-04 PROCEDURE — 99499 RISK ADDL DX/OHS AUDIT: ICD-10-PCS | Mod: S$GLB,,, | Performed by: PSYCHIATRY & NEUROLOGY

## 2019-04-04 PROCEDURE — 1101F PR PT FALLS ASSESS DOC 0-1 FALLS W/OUT INJ PAST YR: ICD-10-PCS | Mod: CPTII,S$GLB,, | Performed by: PSYCHIATRY & NEUROLOGY

## 2019-04-04 RX ORDER — PREGABALIN 100 MG/1
100 CAPSULE ORAL 3 TIMES DAILY
Qty: 90 CAPSULE | Refills: 5 | Status: SHIPPED | OUTPATIENT
Start: 2019-04-04 | End: 2019-04-15

## 2019-04-04 NOTE — PROGRESS NOTES
Date of service: 4/5/2019  Referring provider: Dr. Sergio Camp    Subjective:      Chief complaint: Leg Pain (bilateral- right hurts worse)       Patient ID: Domingo Vaca is a 68 y.o. gentleman with peripheral artery disease, type 2 diabetes, history of cervical fusion, history of lumbar fusion, history of left ankle fusion, CRPS of left lower extremity who is presenting for an opinion on pain.    I had consulted in the hospital on 3/11/18 so he is a follow up patient to me    History of Present Illness    ORIGINAL PAIN HISTORY - 4/4/19   Age at onset and course over time:  He has a long history of chronic low back pain due to flat back, congenitally narrow canal. He had a previous L4-S1 PSIF with Dr Grimes in 2014 followed by SCS placement with Dr Navarro at [facility] in 2015. He has had 7 surgeries total. He had numerous epidural steroids in the past with long-term failure and he was only getting modest relief after the spinal cord stimulator.  Prior to surgery the pain was in the low back, radiating down his thighs into the calves bilaterally worse on the right.  He had no weakness or urinary symptoms.  Pain was interfering with his sleep, sitting, ambulation.    The pain became worse in late 2017. He had a posterior fusion at L2-S1 and the L2-4 decompression with Dr. Camp on 03/06/2018.  Post-op there was immediate left leg weakness and numbness. On exam in the hospital, there was absence of left patellar reflex and weak left knee extension more than left hip flexion. I thought this was a post-op femoral neuropathy from abdominal compression during surgery. Per Dr. Camp's note, there was intra-op left L4 nerve root injury due to scar tissue dissection.    In May 2018 the right leg pain returned suddenly. He had a NCS/EMG 5/24/18 with Dr. Joyner which showed severe incomplete L4 radiculopathy and bilateral chronic S1 radiculopathy.     He did have bilateral stents in the lower extremities in 2016 so a  CTA was done to check the patency.  The stents were patent.  There was residual peripheral arterial occlusive disease in the right common femoral artery and the right popliteal artery.  The left superficial femoral artery was occluded. Dr. Arroyo felt that this anatomically could not explain his pain.      Within the last year his morphine went from 30mg daily to 60mg daily. This did nothing. He consulted with Dr. Lazaro Orantes and had an intrathecal morphine trial. This was ineffective. Then tried dilaudid intrathecally without effect, I do not know the doses.      Left leg is numb from hip to knee. There is a burning pain in the left knee. He reports he can handle this, the problem is his right leg which has excruciating pain from the hip to the ankle. Muscles feel on fire. He had this before the surgery but now it feels more muscular. He reports that before, his muscles were not giving out on him.     He was admitted to [facility] with acute upper GI bleed causing hypovolemic and possible septic shock. Needed pressors and blood products. He had a GI ulcer.     Back pain is manageable with the stimulator.  The stimulator does also cover the left lower extremity for the CRPS.    Location:  Right buttocks, down the hamstring, into the calf and lateral ankle  Radiation:    Quality: numb burning type pain feels like a constant patricia horse pain   Severity: 9  Duration: constant with escalations after 5-10 min of walking   Frequency: daily   Alleviated by: laying down  Exacerbated by: sitting is better than walking, but both hurt. He has to sit down for a while to make it go away.   Associated with: right leg buckling; no right leg numbness, no swelling, no temperature change, no bowel or bladder  Sleep habits:    Current acute treatment:  -- percocet 10mg/325mg - 1.5 - 2 hours of relief only; takes 3 to 4 per day   (plavix)    Current prevention:  -- gabapentin 1200mg TID - ineffective  -- duloxetine 30mg daily - started  3/28/19   (cellexa)    Previously tried:   -- levorphanol 2mg - no response   -- morphine ER 30mg BID     Procedural history:       Review of patient's allergies indicates:   Allergen Reactions    Glipizide Rash     Current Outpatient Medications   Medication Sig Dispense Refill    acetaminophen (TYLENOL) 325 MG tablet Take 2 tablets (650 mg total) by mouth every 8 (eight) hours as needed for Pain.  0    amLODIPine (NORVASC) 5 MG tablet Take 1 tablet (5 mg total) by mouth every evening. 30 tablet 5    citalopram (CELEXA) 20 MG tablet Take 1 tablet (20 mg total) by mouth once daily. 90 tablet 3    clopidogrel (PLAVIX) 75 mg tablet Take 1 tablet (75 mg total) by mouth once daily. 90 tablet 3    DULoxetine (CYMBALTA) 30 MG capsule Take 1 capsule (30 mg total) by mouth once daily. 90 capsule 3    gabapentin (NEURONTIN) 600 MG tablet Take 2 tablets (1,200 mg total) by mouth 3 (three) times daily. 540 tablet 3    metFORMIN (GLUCOPHAGE-XR) 500 MG 24 hr tablet Take 1 tablet (500 mg total) by mouth daily with breakfast. 90 tablet 2    olmesartan (BENICAR) 40 MG tablet Take 1 tablet (40 mg total) by mouth once daily. 90 tablet 3    oxycodone-acetaminophen (PERCOCET)  mg per tablet TK1 T PO Q 4 H PRN P  0    pantoprazole (PROTONIX) 20 MG tablet TAKE 2 TABLETS(40 MG) BY MOUTH TWICE DAILY 360 tablet 0    simvastatin (ZOCOR) 40 MG tablet Take 1 tablet (40 mg total) by mouth every evening. 90 tablet 3    pregabalin (LYRICA) 100 MG capsule Take 1 capsule (100 mg total) by mouth 3 (three) times daily. 90 capsule 5     No current facility-administered medications for this visit.        Past Medical History  Past Medical History:   Diagnosis Date    Adjacent segment disease with spinal stenosis 02/2018    Anticoagulant long-term use     Arthritis     Cervical stenosis of spine     Chronic cervical pain     Chronic lumbar pain     Claudication of both lower extremities     Diabetes mellitus     HTN  (hypertension)     Hyperlipidemia     Lumbar facet arthropathy 02/2018    Lumbar stenosis with neurogenic claudication 02/2018    Obesity     PVD (peripheral vascular disease)     RSD lower limb     Left ankle    S/P insertion of spinal cord stimulator     S/P lumbar spinal fusion     multiple       Past Surgical History  Past Surgical History:   Procedure Laterality Date    ANGIOPLASTY      Aortagram with Runoff; stent leg    ANKLE SURGERY Left     Multiple surgeries; now fused    AORTOGRAM WITH RUNOFF Bilateral 3/8/2016    [facility]o [facility] at [facility] CATH    APPENDECTOMY      BACK SURGERY      5 total    Bronchoscopy Right 3/19/2019    Performed by Antony Coelho Jr., DO at [facility] ENDO    CAROTID STENT Left     CARPAL TUNNEL RELEASE Bilateral     left twice; right once    CERVICAL FUSION      over 5 levels    COLONOSCOPY      EGD (ESOPHAGOGASTRODUODENOSCOPY)- in ICU on drip Left 3/18/2019    Performed by Angel Mckeon MD at [facility] ENDO    EGD (ESOPHAGOGASTRODUODENOSCOPY)-in icu N/A 3/16/2019    Performed by Angel Mckeon MD at [facility] ENDO    FUSION-POSTERIOR LUMBAR INTERBODY FUSION -   L2-S1 ext of fusion N/A 3/6/2018    Performed by Sergio Camp MD at [facility] OR    LAMINECTOMY-LUMBAR-DECOMPRESSION  L2-4 decompression N/A 3/6/2018    Performed by [facility] Camp [facility] at [facility] OR    MYELOGRAM N/A 11/8/2017    Performed by Seb Santana MD at [facility] CATH    MYELOGRAM N/A 6/7/2016    Performed by Edwin Frazier MD at [facility] CATH    pain injection      Multiple    POSTERIOR FUSION LUMBAR SPINE W/ CORPECTOMY      twice in last 18 mos (3/2016)    RELEASE-CARPAL TUNNEL Left 3/5/2015    [facility] at [facility] OR    SPINAL CORD STIMULATOR IMPLANT  implanted 12/8/15    having great pain relief    SYMPATHECTOMY         Family History  Family History   Problem Relation Age of Onset    No Known Problems Mother     Heart disease Father     Early death  Father 56        MI       Social History  Social History     Socioeconomic History    Marital status:      Spouse name: Not on file    Number of children: Not on file    Years of education: Not on file    Highest education level: Not on file   Occupational History    Not on file   Social Needs    Financial resource strain: Not on file    Food insecurity:     Worry: Not on file     Inability: Not on file    Transportation needs:     Medical: Not on file     Non-medical: Not on file   Tobacco Use    Smoking status: Former Smoker     Packs/day: 2.00     Years: 45.00     Pack years: 90.00     Types: Cigarettes     Last attempt to quit: 2011     Years since quittin.5    Smokeless tobacco: Never Used   Substance and Sexual Activity    Alcohol use: No    Drug use: No    Sexual activity: Not on file   Lifestyle    Physical activity:     Days per week: Not on file     Minutes per session: Not on file    Stress: Not on file   Relationships    Social connections:     Talks on phone: Not on file     Gets together: Not on file     Attends [employer] service: Not on file     Active member of club or organization: Not on file     Attends meetings of clubs or organizations: Not on file     Relationship status: Not on file   Other Topics Concern    Not on file   Social History Narrative    Not on file        Review of Systems  14-point review of systems as follows:   No check yomi indicates NEGATIVE response   Constitutional: [] weight loss, [] change to appetite   Eyes: [] change in vision, [] double vision   Ears, nose, mouth, throat: [] frequent nose bleeds, [] ringing in the ears   Respiratory: [] cough, [] wheezing   Cardiovascular: [] chest pain, [] palpitations   Gastrointestinal: [] jaundice, [] nausea/vomiting   Genitourinary: [] incontinence, [] burning with urination   Hematologic/lymphatic: [] easy bruising/bleeding, [] night sweats   Neurological: [] numbness, [x] weakness   Endocrine:  [] fatigue, [] heat/cold intolerance   Allergy/Immunologic: [] fevers, [] chills   Musculoskeletal: [x] muscle pain, [x] joint pain   Psychiatric: [] thoughts of harming self/others, [] depression   Integumentary: [] rashes, [] sores that do not heal     Objective:        Vitals:    04/04/19 1511   BP: 120/68   Pulse: 91   Resp: 16     Body mass index is 37.44 kg/m².    Constitutional: appears in no acute distress, well-developed, well-nourished     Eyes: normal conjunctiva, PERRLA     Ears, nose, mouth, throat: external appearance of ears and nose normal, hearing intact     Cardiovascular: regular rate and rhythm, no murmurs appreciated    Respiratory: unlabored respirations, breath sounds normal bilaterally    Gastrointestinal: no visible abdominal masses, no guarding, no visible hernia    Musculoskeletal:   All muscle groups of the upper extremities are 5/5  The right leg is without atrophy  The left leg shows muscle atrophy in the quads and below the knee  The left leg is with mottling below the knee, slight rubor in the foot, mild swelling of the foot, several healed ulcers, portions of the left leg or cooler than the right.  This is his baseline CRPS.  With full effort he does demonstrate short durations of full strength in all lower extremity muscle groups bilaterally except for the left ankle which is surgically fused  His gait is antalgic    Psychiatric: normal judgment and insight. Oriented to person, place, and time.     Neurologic:   Cortical functions: recent and remote memory intact, normal attention span and concentration, speech fluent, adequate fund of knowledge   Cranial nerves: EOMI, symmetric facial strength  Reflexes: 2+ in the upper and lower extremities, no Rojo  Sensation:    -- There is reduced sensation to temperature in the left C7 dermatome  -- there is loss of light touch, temperature, pinprick sensation in the entire left lower extremity without a dermatomal preference,  proprioception and vibration are absent on left  -- there is preservation of light touch, temperature, pinprick in the entire right leg except for the dorsum of the foot.  Vibration is slightly reduced at the right toe, proprioception intact on the right  Coordination: normal    Data Review:     I have personally reviewed the referring provider's notes, labs, & imaging made available to me today.     RADIOLOGY STUDIES:  I have personally reviewed the pertinent images performed.     Results for orders placed or performed during the hospital encounter of 03/16/19   CT Head Without Contrast    Narrative    EXAMINATION:  CT HEAD WITHOUT CONTRAST    CPT: 76801    CLINICAL HISTORY:  Confusion/delirium, altered LOC, unexplained;Syncope/fainting;.    TECHNIQUE:  Axial CT slices through the head were obtained without the administration of contrast.  Sagittal and coronal reconstructions were performed.  Automated exposure control was utilized.  Total DLP is approximately 674 mGy cm.    COMPARISON:  None available.    FINDINGS:  Mild-to-moderate generalized involutional changes are seen.  No evidence of acute intracranial hemorrhage, mass effect, midline deviation, hydrocephalus, or abnormal extra-axial fluid collection is visualized.  There appears to be  periventricular and deep white matter the hypoattenuation which is nonspecific, but is most commonly associated with chronic microvascular ischemic changes.  No evidence of acute large vessel territory ischemia/infarction is appreciated.  MRI with diffusion-weighted imaging is more sensitive in the assessment of acute ischemia/infarction.  The basilar cisterns are preserved. Minimal mucosal thickening in the lateral left maxillary sinus is seen.  The mastoid air cells appear to be grossly clear.  No acute displaced calvarial fracture is visualized.      Impression    1. No acute intracranial abnormality is visualized.      Electronically signed by: Ruslan Crowe  MD  Date:    03/17/2019  Time:    12:31     04/5/2018 x-ray lumbar spine  Pedicle screws with janice fixation L2-L3-L4-L5 S1  Disc prosthesis L4-5 and L5-S1  Anterior listhesis of L2 on L3  Retrolisthesis of L3 on L4  Prior laminectomy  Facet arthropathy at multiple levels  Dorsal column stimulator at T9  No hardware complication    03/10/2018 myelogram with CT lumbar spine  -- soft tissue/longitudinal ligament thickening along the posterior vertebral body contours and extending caudally from L1 to the sacral elements.    -- This produces significant multilevel moderate to severe constriction of the thecal sac with noted crowding of the cauda equina.  These changes are most significant at L1-2 through L3-4.    At L3-4, there is a large right asymmetric focal disc protrusion resulting in posterior and leftward displacement of the thecal sac and its contents.  Additionally, there is redemonstrated metallic structure adjacent to the left thecal sac contour in the region of the lateral recess at L4-5; this is unchanged in comparison to the prior.    Stable postoperative and extensive degenerative changes of the lumbar spine.  No evidence of organized fluid collection, acute spinal column disruption, or other significant interval alteration is appreciated.    Lab Results   Component Value Date     04/03/2019    K 4.8 04/03/2019    MG 2.5 03/16/2019    CL 99 04/03/2019    CO2 23 04/03/2019    BUN 18 04/03/2019    CREATININE 0.95 04/03/2019     (H) 04/03/2019    HGBA1C 7.1 (H) 03/03/2019    AST 27 03/16/2019    ALT 26 03/16/2019    ALBUMIN 3.7 03/16/2019    PROT 6.5 03/16/2019    BILITOT 0.2 03/16/2019    CHOL 115 (L) 10/10/2018    HDL 40 10/10/2018    LDLCALC 47.2 (L) 10/10/2018    TRIG 139 10/10/2018       Lab Results   Component Value Date    WBC 7.40 04/03/2019    HGB 10.3 (L) 04/03/2019    HCT 32.8 (L) 04/03/2019    MCV 90 04/03/2019     04/03/2019       No results found for: TSH    Assessment & Plan:        Problem List Items Addressed This Visit        Neuro    Lumbar stenosis with neurogenic claudication - Primary    Overview     Status post multiple epidural steroid injections and seven back surgeries, most recent L2-S1 fusion 3/6/18          Complex regional pain syndrome type 1 of left lower extremity    Diabetic polyneuropathy associated with type 2 diabetes mellitus    Overview     No large fiber neuropathy on NCS 5/24/18 but patient with sensory loss in the feet, most likely a diabetic small fiber neuropathy         Relevant Medications    pregabalin (LYRICA) 100 MG capsule    S/P insertion of spinal cord stimulator       ID    Arachnoiditis       Orthopedic    Lumbar facet arthropathy    Left leg weakness    Overview     Due to L4 nerve root injury during scar tissue dissection, improving.          S/P lumbar spinal fusion              Mr. Vaca presented for 2nd opinion on pain management relating to his recurrence of right leg pain after his 7th lumbar surgery.  He has chronic low back pain that is currently manageable with the most recent lumbar decompression and fusion (3/6/18) and with the use of his dorsal column stimulator.  The surgery was complicated by left L4 radiculopathy causing low new left L4 dermatomal numbness and weakness.  The weakness has improved and with full effort he is capable of at least transient full strength in the L4 innervated muscles on the left.  In addition he has sensory loss to all modalities in virtually the entire left leg which is not explainable by an L4 lesion but may be explainable by his history of chronic bilateral S1 radiculopathy and likely small fiber neuropathy below the knee relative to his pre-existing complex regional pain syndrome and presumed diabetic small fiber neuropathy.  His current most bothersome symptom is worsening of a pre-existing right leg radicular discomfort starting in the buttocks and radiating down the posterior leg into the  lateral ankle consistent with a L5/S1 dermatomal pattern.  He seems to be allodynic in this area without any pinky loss of sensation except in the dorsum of the right foot.  The symptoms are specially brought on by walking greater than 5-10 minutes and relieved by resting in the flexed forward posture consistent with a neurogenic claudication.  The etiology of this worsened right radicular pain is most likely a combination of persistent severe spinal canal stenosis (secondary to ligamentous hypertrophy) as well as worsening of pre-existing lumbar arachnoiditis as even in 2017 he had multi-level clumping of the cauda equina.  The symptoms sound like they would be most amenable to dorsal column stimulation but unfortunately he already has a stimulator which does not relieve this pain despite reprogramming it 3 times.    Going forward I think his management should be mostly medical.  I will maximize his neuro modulator regimen by changing gabapentin to Lyrica and I will allow him some time to get use to low-dose duloxetine before increasing this.    In terms of a chronic opiate regimen, this is probably going to be a poorly opiate responsive problem, but he may benefit from antidepressant like opiates such as methadone or tapentadol.    In terms of intrathecal medications I think you would have the most success with neuropathic pain agents such as local anesthetics, clonidine, or ziconitide.     Finally, it would be reasonable to try several right-sided lumbar sympathetic blocks for right leg pain control.    I will send this recommendations to his current pain management physician Dr. Lazaro Orantes.     TESTING:  -- none     REFERRALS:  -- none     PREVENTION OF PAIN:   -- STOP gabapentin 1200mg three times a day   -- START Lyrica 100mg three times per day   -- continue duloxetine 30mg daily   -- consider intra-thecal trial of local anesthetic, clonidine, or Prialt   -- consider lumbar sympathetic blocks     AS-NEEDED  TREATMENT OF PAIN:  -- consider as alternatives or additions to perocet, opiates which have more nerve pain activity like; Nucynta or methadone     No follow-ups on file.  Next appointment June 4, 2019    A total of 60 minutes were spent face to face with the patient and more than half of this time was spent coordinating care and/or counseling.       Marilia Jean MD

## 2019-04-04 NOTE — PATIENT INSTRUCTIONS
TESTING:  -- I will get back to your regarding further tests for arachnoiditis     REFERRALS:  -- none     PREVENTION OF PAIN:   -- STOP gabapentin 1200mg three times a day   -- START Lyrica 100mg three times per day   -- continue duloxetine 30mg daily   -- consider intra-thecal trial of local anesthetic, clonidine, or Prialt   -- consider lumbar sympathetic blocks     AS-NEEDED TREATMENT OF PAIN:  -- consider as alternatives or additions to perocet, opiates which have more nerve pain activity like; Nucynta or methadone

## 2019-04-04 NOTE — LETTER
April 5, 2019      Sergio Camp MD  [address] [facility] [address]  [address]  [facility] [address]           [facility] [place]  [address] [facility] [address] [place] [address]  Phone: [phone]  Fax: [phone]          Patient: Domingo Vaca   MR Number: [id number]   YOB: 1950   Date of Visit: 4/4/2019       Dear Dr. Sergio Camp:    Thank you for referring Domingo Vaca to me for evaluation. Attached you will find relevant portions of my assessment and plan of care.    If you have questions, please do not hesitate to call me. I look forward to following Domingo Vaca along with you.    Sincerely,    Marilia Jean MD    Enclosure  CC:  No Recipients    If you would like to receive this communication electronically, please contact [email or web address] or [phone] to request more information on [employer] Link access.    For providers and/or their staff who would like to refer a patient to [facility], please contact us through our one-stop-shop provider referral line, [facility], at [phone].    If you feel you have received this communication in error or would no longer like to receive these types of communications, please e-mail [email or web address]

## 2019-04-04 NOTE — TELEPHONE ENCOUNTER
Called patient and scheduled appointment. Informed patient to arrive 15 min early for appointment. Patient expressed understanding.

## 2019-04-05 PROBLEM — G03.9 ARACHNOIDITIS: Status: ACTIVE | Noted: 2019-04-05

## 2019-04-08 ENCOUNTER — TELEPHONE (OUTPATIENT)
Dept: ADMINISTRATIVE | Facility: CLINIC | Age: 69
End: 2019-04-08

## 2019-04-09 ENCOUNTER — PATIENT MESSAGE (OUTPATIENT)
Dept: NEUROLOGY | Facility: CLINIC | Age: 69
End: 2019-04-09

## 2019-04-09 ENCOUNTER — TELEPHONE (OUTPATIENT)
Dept: NEUROLOGY | Facility: CLINIC | Age: 69
End: 2019-04-09

## 2019-04-09 NOTE — TELEPHONE ENCOUNTER
Returned call and spoke with patient. Patient was wanting to know which test needed to be ordered. Please advise.

## 2019-04-09 NOTE — TELEPHONE ENCOUNTER
----- Message from Lauren Draper sent at 4/9/2019 10:06 AM CDT -----  Contact: Patient  Patient requesting Nurse give him a call back regarding scheduling a test.    Please contact to advise [phone] (home)

## 2019-04-15 ENCOUNTER — OFFICE VISIT (OUTPATIENT)
Dept: FAMILY MEDICINE | Facility: CLINIC | Age: 69
End: 2019-04-15
Payer: MEDICARE

## 2019-04-15 ENCOUNTER — LAB VISIT (OUTPATIENT)
Dept: LAB | Facility: HOSPITAL | Age: 69
End: 2019-04-15
Attending: FAMILY MEDICINE
Payer: MEDICARE

## 2019-04-15 VITALS
HEART RATE: 81 BPM | DIASTOLIC BLOOD PRESSURE: 60 MMHG | BODY MASS INDEX: 37.54 KG/M2 | HEIGHT: 70 IN | WEIGHT: 262.19 LBS | SYSTOLIC BLOOD PRESSURE: 110 MMHG

## 2019-04-15 DIAGNOSIS — E11.42 DIABETIC POLYNEUROPATHY ASSOCIATED WITH TYPE 2 DIABETES MELLITUS: ICD-10-CM

## 2019-04-15 DIAGNOSIS — K25.0 ACUTE GASTRIC ULCER WITH HEMORRHAGE: ICD-10-CM

## 2019-04-15 DIAGNOSIS — G03.9 ARACHNOIDITIS: ICD-10-CM

## 2019-04-15 DIAGNOSIS — I10 ESSENTIAL HYPERTENSION: Primary | ICD-10-CM

## 2019-04-15 LAB
BASOPHILS # BLD AUTO: 0.05 K/UL (ref 0–0.2)
BASOPHILS NFR BLD: 0.6 % (ref 0–1.9)
DIFFERENTIAL METHOD: ABNORMAL
EOSINOPHIL # BLD AUTO: 0.1 K/UL (ref 0–0.5)
EOSINOPHIL NFR BLD: 1.6 % (ref 0–8)
ERYTHROCYTE [DISTWIDTH] IN BLOOD BY AUTOMATED COUNT: 13.7 % (ref 11.5–14.5)
HCT VFR BLD AUTO: 33.8 % (ref 40–54)
HGB BLD-MCNC: 10.4 G/DL (ref 14–18)
IMM GRANULOCYTES # BLD AUTO: 0.06 K/UL (ref 0–0.04)
IMM GRANULOCYTES NFR BLD AUTO: 0.7 % (ref 0–0.5)
LYMPHOCYTES # BLD AUTO: 1.4 K/UL (ref 1–4.8)
LYMPHOCYTES NFR BLD: 15.8 % (ref 18–48)
MCH RBC QN AUTO: 26.8 PG (ref 27–31)
MCHC RBC AUTO-ENTMCNC: 30.8 G/DL (ref 32–36)
MCV RBC AUTO: 87 FL (ref 82–98)
MONOCYTES # BLD AUTO: 0.8 K/UL (ref 0.3–1)
MONOCYTES NFR BLD: 9.2 % (ref 4–15)
NEUTROPHILS # BLD AUTO: 6.1 K/UL (ref 1.8–7.7)
NEUTROPHILS NFR BLD: 72.1 % (ref 38–73)
NRBC BLD-RTO: 0 /100 WBC
PLATELET # BLD AUTO: 230 K/UL (ref 150–350)
PMV BLD AUTO: 9.7 FL (ref 9.2–12.9)
RBC # BLD AUTO: 3.88 M/UL (ref 4.6–6.2)
WBC # BLD AUTO: 8.52 K/UL (ref 3.9–12.7)

## 2019-04-15 PROCEDURE — 3288F FALL RISK ASSESSMENT DOCD: CPT | Mod: CPTII,S$GLB,, | Performed by: FAMILY MEDICINE

## 2019-04-15 PROCEDURE — 99499 UNLISTED E&M SERVICE: CPT | Mod: S$GLB,,, | Performed by: FAMILY MEDICINE

## 2019-04-15 PROCEDURE — 3078F PR MOST RECENT DIASTOLIC BLOOD PRESSURE < 80 MM HG: ICD-10-PCS | Mod: CPTII,S$GLB,, | Performed by: FAMILY MEDICINE

## 2019-04-15 PROCEDURE — 99214 OFFICE O/P EST MOD 30 MIN: CPT | Mod: S$GLB,,, | Performed by: FAMILY MEDICINE

## 2019-04-15 PROCEDURE — 3045F PR MOST RECENT HEMOGLOBIN A1C LEVEL 7.0-9.0%: ICD-10-PCS | Mod: CPTII,S$GLB,, | Performed by: FAMILY MEDICINE

## 2019-04-15 PROCEDURE — 3074F PR MOST RECENT SYSTOLIC BLOOD PRESSURE < 130 MM HG: ICD-10-PCS | Mod: CPTII,S$GLB,, | Performed by: FAMILY MEDICINE

## 2019-04-15 PROCEDURE — 1100F PTFALLS ASSESS-DOCD GE2>/YR: CPT | Mod: CPTII,S$GLB,, | Performed by: FAMILY MEDICINE

## 2019-04-15 PROCEDURE — 85025 COMPLETE CBC W/AUTO DIFF WBC: CPT

## 2019-04-15 PROCEDURE — 99999 PR PBB SHADOW E&M-EST. PATIENT-LVL III: ICD-10-PCS | Mod: PBBFAC,,, | Performed by: FAMILY MEDICINE

## 2019-04-15 PROCEDURE — 3288F PR FALLS RISK ASSESSMENT DOCUMENTED: ICD-10-PCS | Mod: CPTII,S$GLB,, | Performed by: FAMILY MEDICINE

## 2019-04-15 PROCEDURE — 99499 RISK ADDL DX/OHS AUDIT: ICD-10-PCS | Mod: S$GLB,,, | Performed by: FAMILY MEDICINE

## 2019-04-15 PROCEDURE — 1100F PR PT FALLS ASSESS DOC 2+ FALLS/FALL W/INJURY/YR: ICD-10-PCS | Mod: CPTII,S$GLB,, | Performed by: FAMILY MEDICINE

## 2019-04-15 PROCEDURE — 3078F DIAST BP <80 MM HG: CPT | Mod: CPTII,S$GLB,, | Performed by: FAMILY MEDICINE

## 2019-04-15 PROCEDURE — 3045F PR MOST RECENT HEMOGLOBIN A1C LEVEL 7.0-9.0%: CPT | Mod: CPTII,S$GLB,, | Performed by: FAMILY MEDICINE

## 2019-04-15 PROCEDURE — 36415 COLL VENOUS BLD VENIPUNCTURE: CPT | Mod: PO

## 2019-04-15 PROCEDURE — 99999 PR PBB SHADOW E&M-EST. PATIENT-LVL III: CPT | Mod: PBBFAC,,, | Performed by: FAMILY MEDICINE

## 2019-04-15 PROCEDURE — 3074F SYST BP LT 130 MM HG: CPT | Mod: CPTII,S$GLB,, | Performed by: FAMILY MEDICINE

## 2019-04-15 PROCEDURE — 99214 PR OFFICE/OUTPT VISIT, EST, LEVL IV, 30-39 MIN: ICD-10-PCS | Mod: S$GLB,,, | Performed by: FAMILY MEDICINE

## 2019-04-15 RX ORDER — PREGABALIN 200 MG/1
200 CAPSULE ORAL 3 TIMES DAILY
Qty: 270 CAPSULE | Refills: 0 | Status: SHIPPED | OUTPATIENT
Start: 2019-04-15 | End: 2019-05-20 | Stop reason: SDUPTHER

## 2019-04-15 RX ORDER — AMLODIPINE BESYLATE 5 MG/1
5 TABLET ORAL NIGHTLY
Qty: 90 TABLET | Refills: 3 | Status: SHIPPED | OUTPATIENT
Start: 2019-04-15 | End: 2019-07-19 | Stop reason: SDUPTHER

## 2019-04-15 NOTE — PROGRESS NOTES
Subjective:       Patient ID: Domingo Vaca is a 68 y.o. male    Chief Complaint: Diabetes (6 month follow up. Scooter evaluation. Hm due tetanus,zoster)    HPI  Here today for interval evaluation  He had a recent discharge from a dangerous prolonged hospitalization at [facility]  Patient was admitted with shock and suspected sepsis.  He was intubated and placed on pressors.  He was found to be anemic requiring transfusion.  He was subsequently identified to have a bleeding gastric ulcer.  He was extubated and with PT returned to baseline  He continues with severe low back pain, but pain is primarily located in the legs.  He is managed by a local Pain Management physician with intrathecal narcotics, oral narcotics, and spinal stimulator.  These have been ineffective.  He recently saw Neurology who had several additional recommendations and he is seeing Pain Management today to institute.    Review of Systems   Constitutional: Positive for activity change.   HENT: Negative for hearing loss, rhinorrhea and trouble swallowing.    Eyes: Negative for discharge and visual disturbance.   Respiratory: Negative for chest tightness and wheezing.    Cardiovascular: Negative for chest pain and palpitations.   Gastrointestinal: Negative for blood in stool, constipation, diarrhea and vomiting.   Endocrine: Negative for polydipsia and polyuria.   Genitourinary: Negative for difficulty urinating, hematuria and urgency.   Musculoskeletal: Positive for arthralgias and joint swelling. Negative for neck pain.   Neurological: Positive for weakness. Negative for headaches.   Psychiatric/Behavioral: Negative for confusion and dysphoric mood.        Objective:   Physical Exam   Constitutional: He appears well-developed and well-nourished.   HENT:   Head: Normocephalic and atraumatic.   Eyes: Pupils are equal, round, and reactive to light. EOM are normal.   Neck: Neck supple.   Cardiovascular: Normal rate, regular rhythm and normal heart  sounds. Exam reveals no gallop and no friction rub.   No murmur heard.  Pulmonary/Chest: Effort normal and breath sounds normal. He has no wheezes. He has no rales.   Vitals reviewed.       Assessment:       1. Essential hypertension     2. Acute gastric ulcer with hemorrhage     3. Arachnoiditis          Plan:       Essential hypertension  - Continue current therapy  - Serial blood pressure monitoring  - Diet and exercise education.    Acute gastric ulcer with hemorrhage  - Check cbc  - Continue GI  - Continue current therapy    Arachnoiditis  - Continue Pain Management  - Continue current therapy  - Follow up in about 3 months (around 7/15/2019).

## 2019-04-16 ENCOUNTER — PATIENT MESSAGE (OUTPATIENT)
Dept: FAMILY MEDICINE | Facility: CLINIC | Age: 69
End: 2019-04-16

## 2019-04-16 ENCOUNTER — PATIENT MESSAGE (OUTPATIENT)
Dept: ADMINISTRATIVE | Facility: OTHER | Age: 69
End: 2019-04-16

## 2019-04-23 ENCOUNTER — PATIENT MESSAGE (OUTPATIENT)
Dept: FAMILY MEDICINE | Facility: CLINIC | Age: 69
End: 2019-04-23

## 2019-04-24 ENCOUNTER — TELEPHONE (OUTPATIENT)
Dept: FAMILY MEDICINE | Facility: CLINIC | Age: 69
End: 2019-04-24

## 2019-04-24 DIAGNOSIS — E11.42 DIABETIC POLYNEUROPATHY ASSOCIATED WITH TYPE 2 DIABETES MELLITUS: Primary | ICD-10-CM

## 2019-04-24 NOTE — TELEPHONE ENCOUNTER
----- Message from Lakshmi Saini sent at 4/24/2019  3:07 PM CDT -----  Contact: Pt   Type:  Patient Requesting Referral    Who Called: pt   Does the patient already have the specialty appointment scheduled?:no   If yes, what is the date of that appointment?:  Referral to What Specialty:pain mgmnt  Reason for Referral:the disease of the spinal cord / changing providers  Does the patient want the referral with a specific physician?:no   [facility]   Patient Requesting a Response?: yes   Would the patient rather a call back or a response via [facility]? [facility]   Best Call Back Number:  Additional Information:

## 2019-05-07 ENCOUNTER — PATIENT OUTREACH (OUTPATIENT)
Dept: ADMINISTRATIVE | Facility: HOSPITAL | Age: 69
End: 2019-05-07

## 2019-05-20 RX ORDER — PREGABALIN 200 MG/1
200 CAPSULE ORAL 3 TIMES DAILY
Qty: 270 CAPSULE | Refills: 0 | Status: SHIPPED | OUTPATIENT
Start: 2019-05-20 | End: 2019-07-10 | Stop reason: SDUPTHER

## 2019-05-29 ENCOUNTER — PATIENT MESSAGE (OUTPATIENT)
Dept: FAMILY MEDICINE | Facility: CLINIC | Age: 69
End: 2019-05-29

## 2019-05-30 ENCOUNTER — PATIENT MESSAGE (OUTPATIENT)
Dept: FAMILY MEDICINE | Facility: CLINIC | Age: 69
End: 2019-05-30

## 2019-05-31 ENCOUNTER — OFFICE VISIT (OUTPATIENT)
Dept: FAMILY MEDICINE | Facility: CLINIC | Age: 69
End: 2019-05-31
Payer: MEDICARE

## 2019-05-31 VITALS
OXYGEN SATURATION: 94 % | WEIGHT: 254.44 LBS | DIASTOLIC BLOOD PRESSURE: 64 MMHG | BODY MASS INDEX: 36.5 KG/M2 | SYSTOLIC BLOOD PRESSURE: 124 MMHG | HEART RATE: 78 BPM

## 2019-05-31 DIAGNOSIS — M19.012 OSTEOARTHRITIS OF BOTH SHOULDERS, UNSPECIFIED OSTEOARTHRITIS TYPE: ICD-10-CM

## 2019-05-31 DIAGNOSIS — E11.42 DIABETIC POLYNEUROPATHY ASSOCIATED WITH TYPE 2 DIABETES MELLITUS: Primary | ICD-10-CM

## 2019-05-31 DIAGNOSIS — M19.011 OSTEOARTHRITIS OF BOTH SHOULDERS, UNSPECIFIED OSTEOARTHRITIS TYPE: ICD-10-CM

## 2019-05-31 DIAGNOSIS — Z98.1 S/P CERVICAL SPINAL FUSION: ICD-10-CM

## 2019-05-31 DIAGNOSIS — G90.522 COMPLEX REGIONAL PAIN SYNDROME TYPE 1 OF LEFT LOWER EXTREMITY: ICD-10-CM

## 2019-05-31 DIAGNOSIS — M48.062 LUMBAR STENOSIS WITH NEUROGENIC CLAUDICATION: ICD-10-CM

## 2019-05-31 DIAGNOSIS — E66.01 MORBID OBESITY DUE TO EXCESS CALORIES: ICD-10-CM

## 2019-05-31 PROCEDURE — 99999 PR PBB SHADOW E&M-EST. PATIENT-LVL III: ICD-10-PCS | Mod: PBBFAC,,, | Performed by: PHYSICIAN ASSISTANT

## 2019-05-31 PROCEDURE — 1100F PR PT FALLS ASSESS DOC 2+ FALLS/FALL W/INJURY/YR: ICD-10-PCS | Mod: CPTII,S$GLB,, | Performed by: PHYSICIAN ASSISTANT

## 2019-05-31 PROCEDURE — 1100F PTFALLS ASSESS-DOCD GE2>/YR: CPT | Mod: CPTII,S$GLB,, | Performed by: PHYSICIAN ASSISTANT

## 2019-05-31 PROCEDURE — 3288F PR FALLS RISK ASSESSMENT DOCUMENTED: ICD-10-PCS | Mod: CPTII,S$GLB,, | Performed by: PHYSICIAN ASSISTANT

## 2019-05-31 PROCEDURE — 99999 PR PBB SHADOW E&M-EST. PATIENT-LVL III: CPT | Mod: PBBFAC,,, | Performed by: PHYSICIAN ASSISTANT

## 2019-05-31 PROCEDURE — 99499 UNLISTED E&M SERVICE: CPT | Mod: S$GLB,,, | Performed by: PHYSICIAN ASSISTANT

## 2019-05-31 PROCEDURE — 3074F PR MOST RECENT SYSTOLIC BLOOD PRESSURE < 130 MM HG: ICD-10-PCS | Mod: CPTII,S$GLB,, | Performed by: PHYSICIAN ASSISTANT

## 2019-05-31 PROCEDURE — 3288F FALL RISK ASSESSMENT DOCD: CPT | Mod: CPTII,S$GLB,, | Performed by: PHYSICIAN ASSISTANT

## 2019-05-31 PROCEDURE — 3045F PR MOST RECENT HEMOGLOBIN A1C LEVEL 7.0-9.0%: ICD-10-PCS | Mod: CPTII,S$GLB,, | Performed by: PHYSICIAN ASSISTANT

## 2019-05-31 PROCEDURE — 99214 PR OFFICE/OUTPT VISIT, EST, LEVL IV, 30-39 MIN: ICD-10-PCS | Mod: S$GLB,,, | Performed by: PHYSICIAN ASSISTANT

## 2019-05-31 PROCEDURE — 3074F SYST BP LT 130 MM HG: CPT | Mod: CPTII,S$GLB,, | Performed by: PHYSICIAN ASSISTANT

## 2019-05-31 PROCEDURE — 99214 OFFICE O/P EST MOD 30 MIN: CPT | Mod: S$GLB,,, | Performed by: PHYSICIAN ASSISTANT

## 2019-05-31 PROCEDURE — 99499 RISK ADDL DX/OHS AUDIT: ICD-10-PCS | Mod: S$GLB,,, | Performed by: PHYSICIAN ASSISTANT

## 2019-05-31 PROCEDURE — 3078F PR MOST RECENT DIASTOLIC BLOOD PRESSURE < 80 MM HG: ICD-10-PCS | Mod: CPTII,S$GLB,, | Performed by: PHYSICIAN ASSISTANT

## 2019-05-31 PROCEDURE — 3078F DIAST BP <80 MM HG: CPT | Mod: CPTII,S$GLB,, | Performed by: PHYSICIAN ASSISTANT

## 2019-05-31 PROCEDURE — 3045F PR MOST RECENT HEMOGLOBIN A1C LEVEL 7.0-9.0%: CPT | Mod: CPTII,S$GLB,, | Performed by: PHYSICIAN ASSISTANT

## 2019-05-31 NOTE — PROGRESS NOTES
Subjective:       Patient ID: Domingo Vaca is a 68 y.o. male    Chief Complaint: Follow-up (power wheel chair evaulation)    HPI  The patient presents today CO leg and low back pain that has been getting progressively worse.  He has been diagnosed with lumbar spinal stenosis, complex regional pain syndrome type 1 of the left lower extremity, arachnoiditis and he has diabetic polyneuropathy in both feet.  Also he has a history of cervical fusion about 3 years ago and he has bilateral shoulder arthritis and morbid obesity that prevents him from operating a wheel chair with his upper body.      Review of Systems   Constitutional: Positive for activity change. Negative for unexpected weight change.   HENT: Negative for hearing loss, rhinorrhea and trouble swallowing. Voice change: wheel chairchair.    Eyes: Negative for discharge.   Respiratory: Negative for chest tightness and wheezing.    Cardiovascular: Negative for chest pain and palpitations.   Gastrointestinal: Negative for blood in stool, constipation, diarrhea and vomiting.   Endocrine: Negative for polydipsia and polyuria.   Genitourinary: Negative for difficulty urinating, hematuria and urgency.   Musculoskeletal: Positive for arthralgias and joint swelling. Negative for neck pain.   Neurological: Positive for weakness. Negative for headaches.   Psychiatric/Behavioral: Positive for dysphoric mood. Negative for confusion.     Protective Sensation (w/ 10 gram monofilament):  Right: Decreased  Left: Absent    Visual Inspection:  Normal -  Right     Pedal Pulses:   Right: Present  Left: Diminshed    Posterior tibialis:   Right:Present  Left: Diminshed         Objective:   Physical Exam   Constitutional: He is oriented to person, place, and time. He appears well-developed. No distress.   Morbidly obese   HENT:   Head: Normocephalic and atraumatic.   Right Ear: External ear normal.   Left Ear: External ear normal.   Nose: Nose normal.   Mouth/Throat: Oropharynx  is clear and moist.   Eyes: Pupils are equal, round, and reactive to light. Conjunctivae and EOM are normal.   Neck: Normal range of motion. Neck supple. No JVD present.   Cardiovascular: Normal rate, regular rhythm and normal heart sounds. Exam reveals no gallop and no friction rub.   No murmur heard.  Pulses:       Dorsalis pedis pulses are 3+ on the right side, and 1+ on the left side.        Posterior tibial pulses are 3+ on the right side, and 1+ on the left side.   Pulmonary/Chest: Effort normal and breath sounds normal. No respiratory distress. He has no wheezes. He has no rales.   Musculoskeletal: Normal range of motion. He exhibits tenderness (ttp to light touch of the left leg) and deformity (no ROM of the left ankle due to left ankle fusion). He exhibits no edema.   In a wheel chair, severely limited lumbar flexion and extension due to pain, muscle strength is 4/5 in hip flexors bilaterally, limited shoulder flexion and abduction to 45 degrees bilaterally   Neurological: He is alert and oriented to person, place, and time.   Skin: Skin is warm and dry.   Psychiatric: He has a normal mood and affect. His behavior is normal. Judgment and thought content normal.        Assessment:       1. Diabetic polyneuropathy associated with type 2 diabetes mellitus  MOTORIZED SCOOTER FOR HOME USE   2. Complex regional pain syndrome type 1 of left lower extremity  MOTORIZED SCOOTER FOR HOME USE   3. Lumbar stenosis with neurogenic claudication  MOTORIZED SCOOTER FOR HOME USE   4. S/P cervical spinal fusion  MOTORIZED SCOOTER FOR HOME USE   5. Morbid obesity due to excess calories [E66.01]  MOTORIZED SCOOTER FOR HOME USE   6. Osteoarthritis of both shoulders, unspecified osteoarthritis type  MOTORIZED SCOOTER FOR HOME USE        Plan:       Diabetic polyneuropathy associated with type 2 diabetes mellitus  -     MOTORIZED SCOOTER FOR HOME USE    Complex regional pain syndrome type 1 of left lower extremity  -     MOTORIZED  SCOOTER FOR HOME USE    Lumbar stenosis with neurogenic claudication  -     MOTORIZED SCOOTER FOR HOME USE    S/P cervical spinal fusion  -     MOTORIZED SCOOTER FOR HOME USE    Morbid obesity due to excess calories [E66.01]  -     MOTORIZED SCOOTER FOR HOME USE    Osteoarthritis of both shoulders, unspecified osteoarthritis type  -     MOTORIZED SCOOTER FOR HOME USE

## 2019-06-14 ENCOUNTER — PATIENT MESSAGE (OUTPATIENT)
Dept: FAMILY MEDICINE | Facility: CLINIC | Age: 69
End: 2019-06-14

## 2019-07-01 ENCOUNTER — TELEPHONE (OUTPATIENT)
Dept: FAMILY MEDICINE | Facility: CLINIC | Age: 69
End: 2019-07-01

## 2019-07-01 NOTE — TELEPHONE ENCOUNTER
----- Message from Thomas Sy sent at 7/1/2019 11:45 AM CDT -----  Type: Needs Medical Advice    Who Called: [facility] - DrewSymptoms (please be specific):  NAHow long has patient had these symptoms:    Pharmacy name and phone #:    Best Call Back Number: [phone]  Additional Information: Caller asking if the office received paper work for power wheel chair for the patient.

## 2019-07-02 ENCOUNTER — TELEPHONE (OUTPATIENT)
Dept: FAMILY MEDICINE | Facility: CLINIC | Age: 69
End: 2019-07-02

## 2019-07-02 NOTE — TELEPHONE ENCOUNTER
----- Message from Krys Swenson sent at 7/2/2019 10:39 AM CDT -----  Contact: Trenton from [facility]  Type: Needs Medical Advice    Who Called:  Trenton  Best Call Back Number: [phone]  Additional Information:  Calling to verify receipt of fax sent yesterday. Thanks!

## 2019-07-03 ENCOUNTER — TELEPHONE (OUTPATIENT)
Dept: FAMILY MEDICINE | Facility: CLINIC | Age: 69
End: 2019-07-03

## 2019-07-03 NOTE — TELEPHONE ENCOUNTER
Spoke with Rodney with [facility] and informed her that we did receive the paper work, and Maryana Cool is working on it. Rodney verbalized understanding.

## 2019-07-03 NOTE — TELEPHONE ENCOUNTER
----- Message from Jessica Webster sent at 7/3/2019 11:17 AM CDT -----  Contact: Trenton with [facility]  Type: Needs Medical Advice    Who Called:  Trenton with [facility]  Best Call Back Number: [phone]  Additional Information: please give call back to confirm office received fax in regards to the patient

## 2019-07-08 ENCOUNTER — TELEPHONE (OUTPATIENT)
Dept: FAMILY MEDICINE | Facility: CLINIC | Age: 69
End: 2019-07-08

## 2019-07-08 NOTE — TELEPHONE ENCOUNTER
----- Message from Karina Joshua sent at 7/5/2019  3:27 PM CDT -----  Contact: [facility]  Type: Needs Medical Advice    Who Called:  Trenton Hercules Call Back Number:[phone]  Additional Information: Would like a call back to see if the Dr has signed for the power scooter please confirm it's time sensitive.

## 2019-07-09 ENCOUNTER — TELEPHONE (OUTPATIENT)
Dept: FAMILY MEDICINE | Facility: CLINIC | Age: 69
End: 2019-07-09

## 2019-07-09 NOTE — TELEPHONE ENCOUNTER
----- Message from Arielle Chen sent at 7/8/2019  1:01 PM CDT -----  Contact: Trenton with [facility]  Type: Needs Medical Advice    Who Called:  Trenton with [facility]  Symptoms (please be specific):  na  How long has patient had these symptoms:  zoila  Pharmacy name and phone #:  zoila  Best Call Back Number:[phone]  Additional Information: calling to check the status of orders that were faxed to the office to be signed. Thank you!

## 2019-07-10 RX ORDER — PREGABALIN 200 MG/1
200 CAPSULE ORAL 3 TIMES DAILY
Qty: 270 CAPSULE | Refills: 0 | Status: SHIPPED | OUTPATIENT
Start: 2019-07-10 | End: 2019-07-19 | Stop reason: SDUPTHER

## 2019-07-10 NOTE — TELEPHONE ENCOUNTER
Spoke to Shea Mendez,with "[employer]", who is working with physician to take care of paper work.

## 2019-07-10 NOTE — TELEPHONE ENCOUNTER
----- Message from Emily Mann sent at 7/10/2019  1:00 PM CDT -----  Contact: Onelia with [employer] for Kaitlin Rousseau is requesting a call back to get additional information for the patients assistance program.  Needs a full RX and a full date needed for his enrollments.   Call back at [phone] fax over [phone].  She will fax over the kain again.  Thanks

## 2019-07-11 ENCOUNTER — TELEPHONE (OUTPATIENT)
Dept: FAMILY MEDICINE | Facility: CLINIC | Age: 69
End: 2019-07-11

## 2019-07-11 NOTE — TELEPHONE ENCOUNTER
----- Message from Emily Mann sent at 2019  2:15 PM CDT -----  Contact: [facility] Patient assistance program  She has prescription lyrica dated  so it is  and she needs a new scription.  Call back if any questions at [phone] or just fax over the new prescription at [phone].  Thanks

## 2019-07-19 ENCOUNTER — OFFICE VISIT (OUTPATIENT)
Dept: FAMILY MEDICINE | Facility: CLINIC | Age: 69
End: 2019-07-19
Payer: MEDICARE

## 2019-07-19 VITALS
SYSTOLIC BLOOD PRESSURE: 160 MMHG | HEART RATE: 78 BPM | WEIGHT: 268.31 LBS | DIASTOLIC BLOOD PRESSURE: 82 MMHG | BODY MASS INDEX: 38.41 KG/M2 | HEIGHT: 70 IN

## 2019-07-19 DIAGNOSIS — E11.9 TYPE 2 DIABETES MELLITUS WITHOUT COMPLICATION, UNSPECIFIED WHETHER LONG TERM INSULIN USE: ICD-10-CM

## 2019-07-19 DIAGNOSIS — E11.9 DIABETES MELLITUS WITHOUT COMPLICATION: Primary | ICD-10-CM

## 2019-07-19 DIAGNOSIS — M48.02 CERVICAL STENOSIS OF SPINE: ICD-10-CM

## 2019-07-19 PROCEDURE — 99213 OFFICE O/P EST LOW 20 MIN: CPT | Mod: S$GLB,,, | Performed by: FAMILY MEDICINE

## 2019-07-19 PROCEDURE — 1101F PR PT FALLS ASSESS DOC 0-1 FALLS W/OUT INJ PAST YR: ICD-10-PCS | Mod: CPTII,S$GLB,, | Performed by: FAMILY MEDICINE

## 2019-07-19 PROCEDURE — 3077F SYST BP >= 140 MM HG: CPT | Mod: CPTII,S$GLB,, | Performed by: FAMILY MEDICINE

## 2019-07-19 PROCEDURE — 3045F PR MOST RECENT HEMOGLOBIN A1C LEVEL 7.0-9.0%: CPT | Mod: CPTII,S$GLB,, | Performed by: FAMILY MEDICINE

## 2019-07-19 PROCEDURE — 1101F PT FALLS ASSESS-DOCD LE1/YR: CPT | Mod: CPTII,S$GLB,, | Performed by: FAMILY MEDICINE

## 2019-07-19 PROCEDURE — 99999 PR PBB SHADOW E&M-EST. PATIENT-LVL IV: CPT | Mod: PBBFAC,,, | Performed by: FAMILY MEDICINE

## 2019-07-19 PROCEDURE — 99213 PR OFFICE/OUTPT VISIT, EST, LEVL III, 20-29 MIN: ICD-10-PCS | Mod: S$GLB,,, | Performed by: FAMILY MEDICINE

## 2019-07-19 PROCEDURE — 3079F DIAST BP 80-89 MM HG: CPT | Mod: CPTII,S$GLB,, | Performed by: FAMILY MEDICINE

## 2019-07-19 PROCEDURE — 99499 RISK ADDL DX/OHS AUDIT: ICD-10-PCS | Mod: S$GLB,,, | Performed by: FAMILY MEDICINE

## 2019-07-19 PROCEDURE — 3077F PR MOST RECENT SYSTOLIC BLOOD PRESSURE >= 140 MM HG: ICD-10-PCS | Mod: CPTII,S$GLB,, | Performed by: FAMILY MEDICINE

## 2019-07-19 PROCEDURE — 3079F PR MOST RECENT DIASTOLIC BLOOD PRESSURE 80-89 MM HG: ICD-10-PCS | Mod: CPTII,S$GLB,, | Performed by: FAMILY MEDICINE

## 2019-07-19 PROCEDURE — 99999 PR PBB SHADOW E&M-EST. PATIENT-LVL IV: ICD-10-PCS | Mod: PBBFAC,,, | Performed by: FAMILY MEDICINE

## 2019-07-19 PROCEDURE — 99499 UNLISTED E&M SERVICE: CPT | Mod: S$GLB,,, | Performed by: FAMILY MEDICINE

## 2019-07-19 PROCEDURE — 3045F PR MOST RECENT HEMOGLOBIN A1C LEVEL 7.0-9.0%: ICD-10-PCS | Mod: CPTII,S$GLB,, | Performed by: FAMILY MEDICINE

## 2019-07-19 RX ORDER — DULOXETIN HYDROCHLORIDE 30 MG/1
1 CAPSULE, DELAYED RELEASE ORAL DAILY
COMMUNITY
End: 2020-01-13 | Stop reason: SDUPTHER

## 2019-07-19 RX ORDER — PANTOPRAZOLE SODIUM 20 MG/1
40 TABLET, DELAYED RELEASE ORAL
COMMUNITY
End: 2019-10-30

## 2019-07-19 RX ORDER — PREGABALIN 200 MG/1
1 CAPSULE ORAL
COMMUNITY
End: 2019-07-19 | Stop reason: SDUPTHER

## 2019-07-19 RX ORDER — OXYCODONE AND ACETAMINOPHEN 10; 325 MG/1; MG/1
TABLET ORAL
COMMUNITY
Start: 2019-07-06 | End: 2019-09-24

## 2019-07-19 RX ORDER — AMLODIPINE BESYLATE 5 MG/1
1 TABLET ORAL
COMMUNITY
End: 2019-09-25

## 2019-07-19 RX ORDER — PREGABALIN 200 MG/1
200 CAPSULE ORAL 3 TIMES DAILY
Qty: 270 CAPSULE | Refills: 1 | Status: SHIPPED | OUTPATIENT
Start: 2019-07-19 | End: 2019-10-22 | Stop reason: SDUPTHER

## 2019-07-19 NOTE — LETTER
July 23, 2019    Domingo Vaca  [address]  [address]  [place] [address]             [facility]  [address] [address]  Phone: [phone]  Fax: [phone] To Whom It May Concern    I would like to request an appeal for Domingo Vaca regarding the recent denial of a power operated device.  The denial was based on a determination that he could adequately self propel a manual wheelchair.  He has undergone evaluation of chronic shoulder issues and cervical stenosis with his Orthopedic surgeon who determined along with myself that this determination was incorrect. He is unable to manually propel a wheelchair.   For this reason, we are appealing that denial.  I have attached a copy of that report.     If you have any questions or concerns, please don't hesitate to call.    Sincerely,        Marty Iqbal MD

## 2019-07-19 NOTE — PROGRESS NOTES
Subjective:       Patient ID: Domingo Vaca is a 68 y.o. male    Chief Complaint: Hypertension (f/u.Hm due . tetanus,shingles, eyeexam)    HPI  Here today for interval evaluation.  His primary issue is concerns regarding mobility.  He has been denied for a power wheelchair/scooter device on the basis of an inaccurate determination that he can self propel a manual wheelchair.  Due to severe damage to his shoulders along with cervical stenosis, he is unable to propel a manual wheelchair.    Review of Systems   Constitutional: Positive for activity change. Negative for unexpected weight change.   HENT: Negative for hearing loss, rhinorrhea and trouble swallowing.    Eyes: Negative for discharge and visual disturbance.   Respiratory: Negative for chest tightness and wheezing.    Cardiovascular: Negative for chest pain and palpitations.   Gastrointestinal: Negative for blood in stool, constipation, diarrhea and vomiting.   Endocrine: Negative for polydipsia and polyuria.   Genitourinary: Negative for difficulty urinating, hematuria and urgency.   Musculoskeletal: Positive for arthralgias and joint swelling. Negative for neck pain.   Neurological: Positive for weakness. Negative for headaches.   Psychiatric/Behavioral: Positive for confusion and dysphoric mood.        Objective:   Physical Exam   Constitutional: He is oriented to person, place, and time. He appears well-developed and well-nourished. No distress.   Neurological: He is alert and oriented to person, place, and time.   Vitals reviewed.       Assessment:       1. Diabetes mellitus without complication  Ambulatory consult to Optometry   2. Cervical stenosis of spine          Plan:       Diabetes mellitus without complication  -     Ambulatory consult to Optometry  - Diet and exercise education.  - Serial glucose monitoring  - Continue current therapy    Cervical stenosis of spine  - Will assist in appeal of denial    Other orders  -     pregabalin (LYRICA)  200 MG Cap; Take 1 capsule (200 mg total) by mouth 3 (three) times daily.  Dispense: 270 capsule; Refill: 1

## 2019-07-25 ENCOUNTER — PATIENT MESSAGE (OUTPATIENT)
Dept: FAMILY MEDICINE | Facility: CLINIC | Age: 69
End: 2019-07-25

## 2019-07-25 ENCOUNTER — TELEPHONE (OUTPATIENT)
Dept: NEUROLOGY | Facility: CLINIC | Age: 69
End: 2019-07-25

## 2019-07-30 ENCOUNTER — TELEPHONE (OUTPATIENT)
Dept: NEUROLOGY | Facility: CLINIC | Age: 69
End: 2019-07-30

## 2019-07-30 NOTE — TELEPHONE ENCOUNTER
Returned call and spoke with Debra Mijares at Dr. Petty's office. Informed her that all medical records request must go through [facility]. Fax number for medical records given to Debra Mijares. Debra Mijares verbalized understanding.

## 2019-07-30 NOTE — TELEPHONE ENCOUNTER
----- Message from Siri Duke sent at 7/30/2019 10:17 AM CDT -----  Contact: [facility]  Type: Needs Medical Advice    Who Called:  Ara with   Symptoms (please be specific):    How long has patient had these symptoms:    Pharmacy name and phone #:    Best Call Back Number: [phone]  Additional Information: requesting office notes from 04/05/19,fax the request on 07/18/19 and 07/23/19 no response,fax back to [phone]

## 2019-09-03 ENCOUNTER — OFFICE VISIT (OUTPATIENT)
Dept: OPTOMETRY | Facility: CLINIC | Age: 69
End: 2019-09-03
Payer: MEDICARE

## 2019-09-03 ENCOUNTER — PATIENT MESSAGE (OUTPATIENT)
Dept: FAMILY MEDICINE | Facility: CLINIC | Age: 69
End: 2019-09-03

## 2019-09-03 DIAGNOSIS — H52.4 MYOPIA WITH ASTIGMATISM AND PRESBYOPIA, BILATERAL: ICD-10-CM

## 2019-09-03 DIAGNOSIS — H25.13 NUCLEAR SCLEROSIS, BILATERAL: ICD-10-CM

## 2019-09-03 DIAGNOSIS — H52.203 MYOPIA WITH ASTIGMATISM AND PRESBYOPIA, BILATERAL: ICD-10-CM

## 2019-09-03 DIAGNOSIS — H43.393 VITREOUS FLOATERS, BILATERAL: ICD-10-CM

## 2019-09-03 DIAGNOSIS — E11.9 DIABETES MELLITUS TYPE 2 WITHOUT RETINOPATHY: Primary | ICD-10-CM

## 2019-09-03 DIAGNOSIS — H52.13 MYOPIA WITH ASTIGMATISM AND PRESBYOPIA, BILATERAL: ICD-10-CM

## 2019-09-03 DIAGNOSIS — Z13.5 GLAUCOMA SCREENING: ICD-10-CM

## 2019-09-03 PROCEDURE — 92004 PR EYE EXAM, NEW PATIENT,COMPREHESV: ICD-10-PCS | Mod: S$GLB,,, | Performed by: OPTOMETRIST

## 2019-09-03 PROCEDURE — 99499 RISK ADDL DX/OHS AUDIT: ICD-10-PCS | Mod: S$GLB,,, | Performed by: OPTOMETRIST

## 2019-09-03 PROCEDURE — 99499 UNLISTED E&M SERVICE: CPT | Mod: S$GLB,,, | Performed by: OPTOMETRIST

## 2019-09-03 PROCEDURE — 92015 DETERMINE REFRACTIVE STATE: CPT | Mod: S$GLB,,, | Performed by: OPTOMETRIST

## 2019-09-03 PROCEDURE — 99999 PR PBB SHADOW E&M-EST. PATIENT-LVL II: CPT | Mod: PBBFAC,,, | Performed by: OPTOMETRIST

## 2019-09-03 PROCEDURE — 92004 COMPRE OPH EXAM NEW PT 1/>: CPT | Mod: S$GLB,,, | Performed by: OPTOMETRIST

## 2019-09-03 PROCEDURE — 92015 PR REFRACTION: ICD-10-PCS | Mod: S$GLB,,, | Performed by: OPTOMETRIST

## 2019-09-03 PROCEDURE — 99999 PR PBB SHADOW E&M-EST. PATIENT-LVL II: ICD-10-PCS | Mod: PBBFAC,,, | Performed by: OPTOMETRIST

## 2019-09-03 RX ORDER — OXYCODONE HYDROCHLORIDE 15 MG/1
15 TABLET, FILM COATED, EXTENDED RELEASE ORAL EVERY 12 HOURS
COMMUNITY
End: 2019-10-23

## 2019-09-03 NOTE — PROGRESS NOTES
HPI     Routine diabetic eye exam-dle-7 years    Pt complains of blurred vision at distance. Needing updated glasses rx.   Denies any pain. No flashes or floaters. BSL controlled.     Last edited by Kelsey Mujica on 9/3/2019  8:30 AM. (History)        ROS     Positive for: Eyes    Negative for: Constitutional, Gastrointestinal, Neurological, Skin,   Genitourinary, Musculoskeletal, HENT, Endocrine, Cardiovascular,   Respiratory, Psychiatric, Allergic/Imm, Heme/Lymph    Last edited by SITA Mayer, OD on 9/3/2019  8:44 AM. (History)        Assessment /Plan     For exam results, see Encounter Report.    Diabetes mellitus type 2 without retinopathy    Nuclear sclerosis, bilateral    Vitreous floaters, bilateral    Glaucoma screening    Myopia with astigmatism and presbyopia, bilateral      1. No ret/ no csme, gave info, control glucose, annual DFE  2. Early vis sig, not ready for consult   3. RD precautions given  4. Not suspect  5. Updated specs rx, gave copy, fill prn    Discussed and educated patient on current findings /plan.  RTC 1 year, prn if any changes / issues

## 2019-09-03 NOTE — LETTER
September 3, 2019      Marty Iqbal MD  [address] [facility] [address] [place] [address]           [facility]  [address] [facility] [address] [place] [address]  Phone: [phone]  Fax: [phone]          Patient: Domingo Vaca   MR Number: [id number]   YOB: 1950   Date of Visit: 9/3/2019       Dear Dr. Marty Iqbal:    Thank you for referring Domingo Vaca to me for evaluation. Attached you will find relevant portions of my assessment and plan of care.    If you have questions, please do not hesitate to call me. I look forward to following Domingo Vaca along with you.    Sincerely,    SITA Mayer, OD    Enclosure  CC:  No Recipients    If you would like to receive this communication electronically, please contact [email or web address] or [phone] to request more information on [employer] Link access.    For providers and/or their staff who would like to refer a patient to [facility], please contact us through our one-stop-shop provider referral line, [facility], at [phone].    If you feel you have received this communication in error or would no longer like to receive these types of communications, please e-mail [email or web address]

## 2019-09-05 ENCOUNTER — PATIENT MESSAGE (OUTPATIENT)
Dept: NEUROLOGY | Facility: CLINIC | Age: 69
End: 2019-09-05

## 2019-09-16 ENCOUNTER — PATIENT MESSAGE (OUTPATIENT)
Dept: FAMILY MEDICINE | Facility: CLINIC | Age: 69
End: 2019-09-16

## 2019-09-20 DIAGNOSIS — E11.9 TYPE 2 DIABETES MELLITUS WITHOUT COMPLICATION: ICD-10-CM

## 2019-09-24 ENCOUNTER — IMMUNIZATION (OUTPATIENT)
Dept: PHARMACY | Facility: CLINIC | Age: 69
End: 2019-09-24
Payer: MEDICARE

## 2019-09-24 ENCOUNTER — OFFICE VISIT (OUTPATIENT)
Dept: NEUROLOGY | Facility: CLINIC | Age: 69
End: 2019-09-24
Payer: MEDICARE

## 2019-09-24 VITALS
RESPIRATION RATE: 16 BRPM | SYSTOLIC BLOOD PRESSURE: 90 MMHG | HEIGHT: 70 IN | HEART RATE: 118 BPM | BODY MASS INDEX: 38.5 KG/M2 | WEIGHT: 268.94 LBS | DIASTOLIC BLOOD PRESSURE: 63 MMHG

## 2019-09-24 DIAGNOSIS — E11.42 DIABETIC POLYNEUROPATHY ASSOCIATED WITH TYPE 2 DIABETES MELLITUS: ICD-10-CM

## 2019-09-24 DIAGNOSIS — G03.9 ARACHNOIDITIS: Primary | ICD-10-CM

## 2019-09-24 DIAGNOSIS — G03.9 ARACHNOIDITIS: ICD-10-CM

## 2019-09-24 DIAGNOSIS — Z96.89 SPINAL CORD STIMULATOR STATUS: ICD-10-CM

## 2019-09-24 DIAGNOSIS — G90.522 COMPLEX REGIONAL PAIN SYNDROME TYPE 1 OF LEFT LOWER EXTREMITY: ICD-10-CM

## 2019-09-24 DIAGNOSIS — M48.062 LUMBAR STENOSIS WITH NEUROGENIC CLAUDICATION: ICD-10-CM

## 2019-09-24 PROCEDURE — 99999 PR PBB SHADOW E&M-EST. PATIENT-LVL III: ICD-10-PCS | Mod: PBBFAC,,, | Performed by: PSYCHIATRY & NEUROLOGY

## 2019-09-24 PROCEDURE — 3078F PR MOST RECENT DIASTOLIC BLOOD PRESSURE < 80 MM HG: ICD-10-PCS | Mod: CPTII,S$GLB,, | Performed by: PSYCHIATRY & NEUROLOGY

## 2019-09-24 PROCEDURE — 99214 OFFICE O/P EST MOD 30 MIN: CPT | Mod: S$GLB,,, | Performed by: PSYCHIATRY & NEUROLOGY

## 2019-09-24 PROCEDURE — 3045F PR MOST RECENT HEMOGLOBIN A1C LEVEL 7.0-9.0%: CPT | Mod: CPTII,S$GLB,, | Performed by: PSYCHIATRY & NEUROLOGY

## 2019-09-24 PROCEDURE — 3078F DIAST BP <80 MM HG: CPT | Mod: CPTII,S$GLB,, | Performed by: PSYCHIATRY & NEUROLOGY

## 2019-09-24 PROCEDURE — 3074F PR MOST RECENT SYSTOLIC BLOOD PRESSURE < 130 MM HG: ICD-10-PCS | Mod: CPTII,S$GLB,, | Performed by: PSYCHIATRY & NEUROLOGY

## 2019-09-24 PROCEDURE — 99499 UNLISTED E&M SERVICE: CPT | Mod: S$GLB,,, | Performed by: PSYCHIATRY & NEUROLOGY

## 2019-09-24 PROCEDURE — 1101F PR PT FALLS ASSESS DOC 0-1 FALLS W/OUT INJ PAST YR: ICD-10-PCS | Mod: CPTII,S$GLB,, | Performed by: PSYCHIATRY & NEUROLOGY

## 2019-09-24 PROCEDURE — 3045F PR MOST RECENT HEMOGLOBIN A1C LEVEL 7.0-9.0%: ICD-10-PCS | Mod: CPTII,S$GLB,, | Performed by: PSYCHIATRY & NEUROLOGY

## 2019-09-24 PROCEDURE — 99214 PR OFFICE/OUTPT VISIT, EST, LEVL IV, 30-39 MIN: ICD-10-PCS | Mod: S$GLB,,, | Performed by: PSYCHIATRY & NEUROLOGY

## 2019-09-24 PROCEDURE — 99999 PR PBB SHADOW E&M-EST. PATIENT-LVL III: CPT | Mod: PBBFAC,,, | Performed by: PSYCHIATRY & NEUROLOGY

## 2019-09-24 PROCEDURE — 99499 RISK ADDL DX/OHS AUDIT: ICD-10-PCS | Mod: S$GLB,,, | Performed by: PSYCHIATRY & NEUROLOGY

## 2019-09-24 PROCEDURE — 3074F SYST BP LT 130 MM HG: CPT | Mod: CPTII,S$GLB,, | Performed by: PSYCHIATRY & NEUROLOGY

## 2019-09-24 PROCEDURE — 1101F PT FALLS ASSESS-DOCD LE1/YR: CPT | Mod: CPTII,S$GLB,, | Performed by: PSYCHIATRY & NEUROLOGY

## 2019-09-24 RX ORDER — OXCARBAZEPINE 600 MG/1
TABLET, FILM COATED ORAL
Qty: 28 TABLET | Refills: 0 | Status: SHIPPED | OUTPATIENT
Start: 2019-09-24 | End: 2019-09-25

## 2019-09-24 RX ORDER — OXCARBAZEPINE 600 MG/1
TABLET, FILM COATED ORAL
Qty: 28 TABLET | Refills: 0 | Status: SHIPPED | OUTPATIENT
Start: 2019-09-24 | End: 2019-09-24 | Stop reason: SDUPTHER

## 2019-09-24 RX ORDER — OXCARBAZEPINE 600 MG/1
600 TABLET, FILM COATED ORAL 2 TIMES DAILY
Qty: 180 TABLET | Refills: 3 | Status: ON HOLD | OUTPATIENT
Start: 2019-09-24 | End: 2019-10-01 | Stop reason: SDUPTHER

## 2019-09-24 NOTE — PATIENT INSTRUCTIONS
TESTING:  -- none     REFERRALS:  -- none     PREVENTION OF PAIN:   -- BEGIN Oxcarbazepine (Trileptal) half tablet twice a day x 3-5 days, if tolerating (in terms of nausea, dizziness) then raise to full tablet twice per day (seizure medicine which is excellent for sudden sharp nerve pains), 14 day supply sent to [facility], regular supply sent to [facility]  -- continue Lyrica 200mg three times per day   -- continue duloxetine 30mg daily     AS-NEEDED TREATMENT OF PAIN:  -- if oxydocone is truly not helping, recommend weaning off - I.e. 3 tablets per day, then 2 per day, then 1 per day at regular intervals

## 2019-09-24 NOTE — PROGRESS NOTES
Date of service: 9/24/2019  Referring provider: No ref. provider found    Subjective:      Chief complaint: Leg Pain       Patient ID: Domingo Vaca is a 68 y.o. gentleman with lumbar arachnoiditis, peripheral artery disease, type 2 diabetes, history of cervical fusion, history of lumbar fusion, history of left ankle fusion, CRPS of left lower extremity who is presenting for follow up of pain     History of Present Illness    INTERVAL HISTORY - 9/24/19     He was last seen in the clinic approximately 5-6 months ago.     His chronic pain history is complicated, and in my opinion is representation of lumbar stenosis with neurogenic claudication, arachnoiditis, complex regional pain syndrome, diabetic polyneuropathy, facet arthropathy.  I made several recommendations for treating arachnoiditis pain. He was following with Dr. Lazaro Orantes.    In the interim, Dr. Orantes tried to rotate to Nucynta which was denied by his insurance.    He is currently on Lyrica 200 mg t.i.d..  In August Em Orantes raised his oxycodone to 15 mg 4 times a day.  He has not had any further procedures.    Today he is much worse. He reports his pain is progressing. He is unable to walk, using wheelchair for this visit. At home he ambulates short distances with extreme pain, he reports Dr. Iqbal is trying to order a power wheelchair for this purpose.  Otherwise he has a walker.    He is in pain 100% of the time.  4-5 times per day he will have severe, sudden, episodes of stabbing and burning from the buttocks to the toes in the right leg, and the knee to the toes in the left leg.  The right buttocks is constantly sore.  Afterwards there is a remaining soreness in the legs.    The oxycodone 15mg is not helping at all, even when he takes 2-3 in a span of a couple hours.     He has developed mild urinary incontinence.    His current pain score is 10.       ORIGINAL PAIN HISTORY - 4/4/19   Age at onset and course over time:  He has a long history  of chronic low back pain due to flat back, congenitally narrow canal. He had a previous L4-S1 PSIF with Dr Grimes in 2014 followed by SCS placement with Dr Navarro at [facility] in 2015. He has had 7 surgeries total. He had numerous epidural steroids in the past with long-term failure and he was only getting modest relief after the spinal cord stimulator.  Prior to surgery the pain was in the low back, radiating down his thighs into the calves bilaterally worse on the right.  He had no weakness or urinary symptoms.  Pain was interfering with his sleep, sitting, ambulation.    The pain became worse in late 2017. He had a posterior fusion at L2-S1 and the L2-4 decompression with Dr. Camp on 03/06/2018.  Post-op there was immediate left leg weakness and numbness. On exam in the hospital, there was absence of left patellar reflex and weak left knee extension more than left hip flexion. I thought this was a post-op femoral neuropathy from abdominal compression during surgery. Per Dr. Camp's note, there was intra-op left L4 nerve root injury due to scar tissue dissection.    In May 2018 the right leg pain returned suddenly. He had a NCS/EMG 5/24/18 with Dr. Joyner which showed severe incomplete L4 radiculopathy and bilateral chronic S1 radiculopathy.     He did have bilateral stents in the lower extremities in 2016 so a CTA was done to check the patency.  The stents were patent.  There was residual peripheral arterial occlusive disease in the right common femoral artery and the right popliteal artery.  The left superficial femoral artery was occluded. Dr. Arroyo felt that this anatomically could not explain his pain.      Within the last year his morphine went from 30mg daily to 60mg daily. This did nothing. He consulted with Dr. Lazaro Orantes and had an intrathecal morphine trial. This was ineffective. Then tried dilaudid intrathecally without effect, I do not know the doses.      Left leg is numb from hip to knee. There is  a burning pain in the left knee. He reports he can handle this, the problem is his right leg which has excruciating pain from the hip to the ankle. Muscles feel on fire. He had this before the surgery but now it feels more muscular. He reports that before, his muscles were not giving out on him.     He was admitted to [facility] with acute upper GI bleed causing hypovolemic and possible septic shock. Needed pressors and blood products. He had a GI ulcer.     Back pain is manageable with the stimulator.  The stimulator does also cover the left lower extremity for the CRPS.    Location:  Right buttocks, down the hamstring, into the calf and lateral ankle  Radiation:    Quality: numb burning type pain feels like a constant patricia horse pain   Severity: 9  Duration: constant with escalations after 5-10 min of walking   Frequency: daily   Alleviated by: laying down  Exacerbated by: sitting is better than walking, but both hurt. He has to sit down for a while to make it go away.   Associated with: right leg buckling; no right leg numbness, no swelling, no temperature change, no bowel or bladder  Sleep habits:    Current acute treatment:  Oxycodone 15 mg #120 per month  (plavix)    Current prevention:  Lyrica 200 mg 3 times per day  duloxetine 30mg daily - started 3/28/19   (cellexa)    Previously tried:   -- levorphanol 2mg - no response   -- morphine ER 30mg BID   -- gabapentin 1200mg TID - ineffective  -- percocet 10mg/325mg - 1.5 - 2 hours of relief only; takes 3 to 4 per day     Procedural history:       Review of patient's allergies indicates:   Allergen Reactions    Glipizide Rash     Current Outpatient Medications   Medication Sig Dispense Refill    amLODIPine (NORVASC) 5 MG tablet Take 1 tablet by mouth.      citalopram (CELEXA) 20 MG tablet Take 1 tablet (20 mg total) by mouth once daily. 90 tablet 3    clopidogrel (PLAVIX) 75 mg tablet Take 1 tablet (75 mg total) by mouth once daily. 90 tablet 3    DULoxetine  (CYMBALTA) 30 MG capsule Take 1 capsule by mouth.      metFORMIN (GLUCOPHAGE-XR) 500 MG 24 hr tablet Take 1 tablet (500 mg total) by mouth daily with breakfast. 90 tablet 2    olmesartan (BENICAR) 40 MG tablet Take 1 tablet (40 mg total) by mouth once daily. 90 tablet 3    oxyCODONE (OXYCONTIN) 15 mg TR12 12 hr tablet Take 15 mg by mouth every 12 (twelve) hours.      pantoprazole (PROTONIX) 20 MG tablet pantoprazole 20 mg tablet,delayed release      pregabalin (LYRICA) 200 MG Cap Take 1 capsule (200 mg total) by mouth 3 (three) times daily. 270 capsule 1    simvastatin (ZOCOR) 40 MG tablet Take 1 tablet (40 mg total) by mouth every evening. 90 tablet 3    OXcarbazepine (TRILEPTAL) 600 MG Tab Half tab PO BID x 3-5 days then raise to full tab BID for 14 days total 28 tablet 0    OXcarbazepine (TRILEPTAL) 600 MG Tab Take 1 tablet (600 mg total) by mouth 2 (two) times daily. 180 tablet 3     No current facility-administered medications for this visit.        Past Medical History  Past Medical History:   Diagnosis Date    Adjacent segment disease with spinal stenosis 02/2018    Anticoagulant long-term use     Arthritis     Cervical stenosis of spine     Chronic cervical pain     Chronic lumbar pain     Claudication of both lower extremities     Diabetes mellitus     HTN (hypertension)     Hyperlipidemia     Lumbar facet arthropathy 02/2018    Lumbar stenosis with neurogenic claudication 02/2018    Obesity     PVD (peripheral vascular disease)     RSD lower limb     Left ankle    S/P insertion of spinal cord stimulator     S/P lumbar spinal fusion     multiple       Past Surgical History  Past Surgical History:   Procedure Laterality Date    ANGIOPLASTY      Aortagram with Runoff; stent leg    ANKLE SURGERY Left     Multiple surgeries; now fused    APPENDECTOMY      BACK SURGERY      5 total    BRONCHOSCOPY Right 3/19/2019    Procedure: Bronchoscopy;  Surgeon: Antony Coelho Jr., DO;   Location: [facility] ENDO;  Service: Pulmonary;  Laterality: Right;    CAROTID STENT Left     CARPAL TUNNEL RELEASE Bilateral     left twice; right once    CERVICAL FUSION      over 5 levels    COLONOSCOPY      ESOPHAGOGASTRODUODENOSCOPY N/A 3/16/2019    Procedure: EGD (ESOPHAGOGASTRODUODENOSCOPY)-in icu;  Surgeon: Angel Mckeon MD;  Location: [facility] ENDO;  Service: Endoscopy;  Laterality: N/A;    ESOPHAGOGASTRODUODENOSCOPY Left 3/18/2019    Procedure: EGD (ESOPHAGOGASTRODUODENOSCOPY)- in ICU on drip;  Surgeon: Angel Mckeon MD;  Location: [facility] ENDO;  Service: Endoscopy;  Laterality: Left;    pain injection      Multiple    POSTERIOR FUSION LUMBAR SPINE W/ CORPECTOMY      twice in last 18 mos (3/2016)    SPINAL CORD STIMULATOR IMPLANT  implanted 12/8/15    having great pain relief    SYMPATHECTOMY         Family History  Family History   Problem Relation Age of Onset    No Known Problems Mother     Heart disease Father     Early death Father 56        MI    Glaucoma Neg Hx        Social History  Social History     Socioeconomic History    Marital status:      Spouse name: Not on file    Number of children: Not on file    Years of education: Not on file    Highest education level: Not on file   Occupational History    Not on file   Social Needs    Financial resource strain: Somewhat hard    Food insecurity:     Worry: Often true     Inability: Often true    Transportation needs:     Medical: Yes     Non-medical: Yes   Tobacco Use    Smoking status: Former Smoker     Packs/day: 2.00     Years: 45.00     Pack years: 90.00     Types: Cigarettes     Last attempt to quit: 2011     Years since quittin.0    Smokeless tobacco: Never Used   Substance and Sexual Activity    Alcohol use: No     Frequency: Never     Binge frequency: Never    Drug use: No    Sexual activity: Not on file   Lifestyle    Physical activity:     Days per week: 0 days     Minutes per session: 0 min     Stress: To some extent   Relationships    Social connections:     Talks on phone: Once a week     Gets together: Never     Attends [employer] service: Not on file     Active member of club or organization: No     Attends meetings of clubs or organizations: Never     Relationship status:    Other Topics Concern    Not on file   Social History Narrative    Not on file        Review of Systems  14-point review of systems as follows:   No check yomi indicates NEGATIVE response   Constitutional: [] weight loss, [] change to appetite   Eyes: [] change in vision, [] double vision   Ears, nose, mouth, throat: [] frequent nose bleeds, [] ringing in the ears   Respiratory: [] cough, [] wheezing   Cardiovascular: [] chest pain, [] palpitations   Gastrointestinal: [] jaundice, [] nausea/vomiting   Genitourinary: [] incontinence, [] burning with urination   Hematologic/lymphatic: [] easy bruising/bleeding, [] night sweats   Neurological: [] numbness, [] weakness   Endocrine: [] fatigue, [] heat/cold intolerance   Allergy/Immunologic: [] fevers, [] chills   Musculoskeletal: [] muscle pain, [] joint pain   Psychiatric: [] thoughts of harming self/others, [] depression   Integumentary: [] rashes, [] sores that do not heal     Objective:        Vitals:    09/24/19 1527   BP: 90/63   Pulse: (!) 118   Resp: 16     Body mass index is 38.59 kg/m².     Wheelchair   Wincing in pain   Right leg with normal sensation to temperature  Left leg with abnormal sensation to temperature in the left thigh  Reflexes 2+ in the lower extremities  Strength antalgic  Left lower extremity mottled and atrophied    4/4/19   Constitutional: appears in no acute distress, well-developed, well-nourished     Eyes: normal conjunctiva, PERRLA     Ears, nose, mouth, throat: external appearance of ears and nose normal, hearing intact     Cardiovascular: regular rate and rhythm, no murmurs appreciated    Respiratory: unlabored respirations, breath sounds  normal bilaterally    Gastrointestinal: no visible abdominal masses, no guarding, no visible hernia    Musculoskeletal:   All muscle groups of the upper extremities are 5/5  The right leg is without atrophy  The left leg shows muscle atrophy in the quads and below the knee  The left leg is with mottling below the knee, slight rubor in the foot, mild swelling of the foot, several healed ulcers, portions of the left leg or cooler than the right.  This is his baseline CRPS.  With full effort he does demonstrate short durations of full strength in all lower extremity muscle groups bilaterally except for the left ankle which is surgically fused  His gait is antalgic    Psychiatric: normal judgment and insight. Oriented to person, place, and time.     Neurologic:   Cortical functions: recent and remote memory intact, normal attention span and concentration, speech fluent, adequate fund of knowledge   Cranial nerves: EOMI, symmetric facial strength  Reflexes: 2+ in the upper and lower extremities, no Rojo  Sensation:    -- There is reduced sensation to temperature in the left C7 dermatome  -- there is loss of light touch, temperature, pinprick sensation in the entire left lower extremity without a dermatomal preference, proprioception and vibration are absent on left  -- there is preservation of light touch, temperature, pinprick in the entire right leg except for the dorsum of the foot.  Vibration is slightly reduced at the right toe, proprioception intact on the right  Coordination: normal    Data Review:     I have personally reviewed the referring provider's notes, labs, & imaging made available to me today.     RADIOLOGY STUDIES:  I have personally reviewed the pertinent images performed.     Results for orders placed or performed during the hospital encounter of 03/16/19   CT Head Without Contrast    Narrative    EXAMINATION:  CT HEAD WITHOUT CONTRAST    CPT: 02893    CLINICAL HISTORY:  Confusion/delirium, altered  LOC, unexplained;Syncope/fainting;.    TECHNIQUE:  Axial CT slices through the head were obtained without the administration of contrast.  Sagittal and coronal reconstructions were performed.  Automated exposure control was utilized.  Total DLP is approximately 674 mGy cm.    COMPARISON:  None available.    FINDINGS:  Mild-to-moderate generalized involutional changes are seen.  No evidence of acute intracranial hemorrhage, mass effect, midline deviation, hydrocephalus, or abnormal extra-axial fluid collection is visualized.  There appears to be  periventricular and deep white matter the hypoattenuation which is nonspecific, but is most commonly associated with chronic microvascular ischemic changes.  No evidence of acute large vessel territory ischemia/infarction is appreciated.  MRI with diffusion-weighted imaging is more sensitive in the assessment of acute ischemia/infarction.  The basilar cisterns are preserved. Minimal mucosal thickening in the lateral left maxillary sinus is seen.  The mastoid air cells appear to be grossly clear.  No acute displaced calvarial fracture is visualized.      Impression    1. No acute intracranial abnormality is visualized.      Electronically signed by: Ruslan Crowe MD  Date:    03/17/2019  Time:    12:31     04/5/2018 x-ray lumbar spine  Pedicle screws with janice fixation L2-L3-L4-L5 S1  Disc prosthesis L4-5 and L5-S1  Anterior listhesis of L2 on L3  Retrolisthesis of L3 on L4  Prior laminectomy  Facet arthropathy at multiple levels  Dorsal column stimulator at T9  No hardware complication    03/10/2018 myelogram with CT lumbar spine  -- soft tissue/longitudinal ligament thickening along the posterior vertebral body contours and extending caudally from L1 to the sacral elements.    -- This produces significant multilevel moderate to severe constriction of the thecal sac with noted crowding of the cauda equina.  These changes are most significant at L1-2 through L3-4.    At L3-4,  there is a large right asymmetric focal disc protrusion resulting in posterior and leftward displacement of the thecal sac and its contents.  Additionally, there is redemonstrated metallic structure adjacent to the left thecal sac contour in the region of the lateral recess at L4-5; this is unchanged in comparison to the prior.    Stable postoperative and extensive degenerative changes of the lumbar spine.  No evidence of organized fluid collection, acute spinal column disruption, or other significant interval alteration is appreciated.    Lab Results   Component Value Date     04/03/2019    K 4.8 04/03/2019    MG 2.5 03/16/2019    CL 99 04/03/2019    CO2 23 04/03/2019    BUN 18 04/03/2019    CREATININE 0.95 04/03/2019     (H) 04/03/2019    HGBA1C 7.1 (H) 03/03/2019    AST 27 03/16/2019    ALT 26 03/16/2019    ALBUMIN 3.7 03/16/2019    PROT 6.5 03/16/2019    BILITOT 0.2 03/16/2019    CHOL 115 (L) 10/10/2018    HDL 40 10/10/2018    LDLCALC 47.2 (L) 10/10/2018    TRIG 139 10/10/2018       Lab Results   Component Value Date    WBC 8.52 04/15/2019    HGB 10.4 (L) 04/15/2019    HCT 33.8 (L) 04/15/2019    MCV 87 04/15/2019     04/15/2019       No results found for: TSH    Assessment & Plan:       Problem List Items Addressed This Visit        Neuro    Lumbar stenosis with neurogenic claudication    Overview     Status post multiple epidural steroid injections and seven back surgeries, most recent L2-S1 fusion 3/6/18          Complex regional pain syndrome type 1 of left lower extremity    Diabetic polyneuropathy associated with type 2 diabetes mellitus    Overview     No large fiber neuropathy on NCS 5/24/18 but patient with sensory loss in the feet, most likely a diabetic small fiber neuropathy         Spinal cord stimulator status    Overview     Controlling his back pain and left leg CRPS pain            ID    Arachnoiditis - Primary    Overview     Patient most likely has lumbar he has of  arachnoiditis after multiple lumbar procedures in 7 surgeries  Most likely the cause of his severe, progressive, right lower extremity pain refractory to surgery (in fact worse after surgery)    At initial consult we had discussed - use of neuro modulating opiate like Nucynta or methadone (nucynta was denied by insurance), lumbar sympathetic blocks, neuropathic intrathecal agents (bupivacaine, clonidine, Prialt)    His pain is showing opiate refractory qualities thus I do not think rotation to methadone is appropriate this point.  Rather, his worst pain is paroxysmal lumbar neuralgia, most appropriate medication would be Trileptal as this has excellent neuralgia coverage         Relevant Medications    OXcarbazepine (TRILEPTAL) 600 MG Tab    OXcarbazepine (TRILEPTAL) 600 MG Tab                TESTING:  -- none     REFERRALS:  -- none     PREVENTION OF PAIN:   -- BEGIN Oxcarbazepine (Trileptal) half tablet twice a day x 3-5 days, if tolerating (in terms of nausea, dizziness) then raise to full tablet twice per day (seizure medicine which is excellent for sudden sharp nerve pains), 14 day supply sent to [facility], regular supply sent to [facility]   -- continue Lyrica 200mg three times per day   -- continue duloxetine 30mg daily     AS-NEEDED TREATMENT OF PAIN:  -- if oxydocone is truly not helping, recommend weaning off - I.e. 3 tablets per day, then 2 per day, then 1 per day at regular intervals     Follow up in about 4 weeks (around 10/22/2019).      Marilia Jean MD

## 2019-09-25 PROBLEM — R79.89 ELEVATED LACTIC ACID LEVEL: Status: ACTIVE | Noted: 2019-09-25

## 2019-09-25 PROBLEM — J96.90 RESPIRATORY FAILURE: Status: ACTIVE | Noted: 2019-09-25

## 2019-09-25 PROBLEM — J69.0 ASPIRATION PNEUMONIA: Status: ACTIVE | Noted: 2019-09-25

## 2019-09-25 PROBLEM — F11.20 NARCOTIC DEPENDENCE: Status: ACTIVE | Noted: 2019-09-25

## 2019-09-25 PROBLEM — R79.89 ELEVATED TROPONIN: Status: ACTIVE | Noted: 2019-09-25

## 2019-09-25 PROBLEM — K27.9 PUD (PEPTIC ULCER DISEASE): Status: ACTIVE | Noted: 2019-09-25

## 2019-09-25 PROBLEM — R06.02 SOB (SHORTNESS OF BREATH): Status: ACTIVE | Noted: 2019-09-25

## 2019-09-27 ENCOUNTER — PATIENT MESSAGE (OUTPATIENT)
Dept: NEUROLOGY | Facility: CLINIC | Age: 69
End: 2019-09-27

## 2019-09-29 PROBLEM — T36.95XA ANTIBIOTIC-ASSOCIATED DIARRHEA: Status: ACTIVE | Noted: 2019-09-29

## 2019-09-29 PROBLEM — K52.1 ANTIBIOTIC-ASSOCIATED DIARRHEA: Status: ACTIVE | Noted: 2019-09-29

## 2019-09-30 DIAGNOSIS — E11.49 TYPE 2 DIABETES MELLITUS WITH OTHER NEUROLOGIC COMPLICATION, WITHOUT LONG-TERM CURRENT USE OF INSULIN: ICD-10-CM

## 2019-09-30 PROBLEM — R35.0 FREQUENCY OF MICTURITION: Status: ACTIVE | Noted: 2019-09-30

## 2019-09-30 PROBLEM — R39.15 URINARY URGENCY: Status: ACTIVE | Noted: 2019-09-30

## 2019-09-30 PROBLEM — N39.41 URGENCY INCONTINENCE: Status: ACTIVE | Noted: 2019-09-30

## 2019-09-30 RX ORDER — METFORMIN HYDROCHLORIDE 500 MG/1
TABLET, EXTENDED RELEASE ORAL
Qty: 90 TABLET | Refills: 3 | Status: SHIPPED | OUTPATIENT
Start: 2019-09-30 | End: 2019-10-01 | Stop reason: SDUPTHER

## 2019-10-01 ENCOUNTER — TELEPHONE (OUTPATIENT)
Dept: CARDIOLOGY | Facility: CLINIC | Age: 69
End: 2019-10-01

## 2019-10-01 NOTE — TELEPHONE ENCOUNTER
----- Message from Kateryna Reese sent at 10/1/2019  3:56 PM CDT -----  Type:  Sooner Appointment Request    Caller is requesting a soon appointment.     Name of Caller:  Nurse with [facility]  When is the first available appointment?  December  Symptoms:  Patient had Angiogram done in [facility] by doctor  Best Call Back Number:  [phone]  Additional Information:  Needs to be seen in two weeks

## 2019-10-07 PROBLEM — I25.119 CORONARY ARTERY DISEASE INVOLVING NATIVE CORONARY ARTERY OF NATIVE HEART WITH ANGINA PECTORIS: Status: ACTIVE | Noted: 2019-10-07

## 2019-10-07 PROBLEM — R31.9 HEMATURIA: Status: RESOLVED | Noted: 2019-03-01 | Resolved: 2019-10-07

## 2019-10-07 PROBLEM — R09.02 HYPOXIA: Status: RESOLVED | Noted: 2019-03-02 | Resolved: 2019-10-07

## 2019-10-07 PROBLEM — R65.21 SEPTIC SHOCK: Status: RESOLVED | Noted: 2019-03-16 | Resolved: 2019-10-07

## 2019-10-07 PROBLEM — A41.9 SEPTIC SHOCK: Status: RESOLVED | Noted: 2019-03-16 | Resolved: 2019-10-07

## 2019-10-07 PROBLEM — R79.89 ELEVATED LACTIC ACID LEVEL: Status: RESOLVED | Noted: 2019-09-25 | Resolved: 2019-10-07

## 2019-10-07 PROBLEM — T36.95XA ANTIBIOTIC-ASSOCIATED DIARRHEA: Status: RESOLVED | Noted: 2019-09-29 | Resolved: 2019-10-07

## 2019-10-07 PROBLEM — N17.9 AKI (ACUTE KIDNEY INJURY): Status: RESOLVED | Noted: 2019-03-01 | Resolved: 2019-10-07

## 2019-10-07 PROBLEM — K52.1 ANTIBIOTIC-ASSOCIATED DIARRHEA: Status: RESOLVED | Noted: 2019-09-29 | Resolved: 2019-10-07

## 2019-10-07 PROBLEM — K92.2 ACUTE UPPER GI BLEED: Status: RESOLVED | Noted: 2019-03-16 | Resolved: 2019-10-07

## 2019-10-17 ENCOUNTER — PATIENT MESSAGE (OUTPATIENT)
Dept: NEUROLOGY | Facility: CLINIC | Age: 69
End: 2019-10-17

## 2019-10-17 ENCOUNTER — OFFICE VISIT (OUTPATIENT)
Dept: CARDIOLOGY | Facility: CLINIC | Age: 69
End: 2019-10-17
Payer: MEDICARE

## 2019-10-17 VITALS
SYSTOLIC BLOOD PRESSURE: 125 MMHG | DIASTOLIC BLOOD PRESSURE: 71 MMHG | HEART RATE: 86 BPM | WEIGHT: 269.81 LBS | BODY MASS INDEX: 40.89 KG/M2 | HEIGHT: 68 IN

## 2019-10-17 DIAGNOSIS — I10 ESSENTIAL HYPERTENSION: ICD-10-CM

## 2019-10-17 DIAGNOSIS — I73.9 PAD (PERIPHERAL ARTERY DISEASE): Primary | ICD-10-CM

## 2019-10-17 DIAGNOSIS — I25.119 CORONARY ARTERY DISEASE INVOLVING NATIVE CORONARY ARTERY OF NATIVE HEART WITH ANGINA PECTORIS: ICD-10-CM

## 2019-10-17 DIAGNOSIS — E78.2 MIXED HYPERLIPIDEMIA: ICD-10-CM

## 2019-10-17 PROCEDURE — 3078F DIAST BP <80 MM HG: CPT | Mod: CPTII,S$GLB,, | Performed by: INTERNAL MEDICINE

## 2019-10-17 PROCEDURE — 99499 UNLISTED E&M SERVICE: CPT | Mod: S$GLB,,, | Performed by: INTERNAL MEDICINE

## 2019-10-17 PROCEDURE — 99999 PR PBB SHADOW E&M-EST. PATIENT-LVL III: CPT | Mod: PBBFAC,,, | Performed by: INTERNAL MEDICINE

## 2019-10-17 PROCEDURE — 3074F SYST BP LT 130 MM HG: CPT | Mod: CPTII,S$GLB,, | Performed by: INTERNAL MEDICINE

## 2019-10-17 PROCEDURE — 3078F PR MOST RECENT DIASTOLIC BLOOD PRESSURE < 80 MM HG: ICD-10-PCS | Mod: CPTII,S$GLB,, | Performed by: INTERNAL MEDICINE

## 2019-10-17 PROCEDURE — 3074F PR MOST RECENT SYSTOLIC BLOOD PRESSURE < 130 MM HG: ICD-10-PCS | Mod: CPTII,S$GLB,, | Performed by: INTERNAL MEDICINE

## 2019-10-17 PROCEDURE — 1101F PR PT FALLS ASSESS DOC 0-1 FALLS W/OUT INJ PAST YR: ICD-10-PCS | Mod: CPTII,S$GLB,, | Performed by: INTERNAL MEDICINE

## 2019-10-17 PROCEDURE — 1101F PT FALLS ASSESS-DOCD LE1/YR: CPT | Mod: CPTII,S$GLB,, | Performed by: INTERNAL MEDICINE

## 2019-10-17 PROCEDURE — 99499 RISK ADDL DX/OHS AUDIT: ICD-10-PCS | Mod: S$GLB,,, | Performed by: INTERNAL MEDICINE

## 2019-10-17 PROCEDURE — 99214 OFFICE O/P EST MOD 30 MIN: CPT | Mod: S$GLB,,, | Performed by: INTERNAL MEDICINE

## 2019-10-17 PROCEDURE — 99214 PR OFFICE/OUTPT VISIT, EST, LEVL IV, 30-39 MIN: ICD-10-PCS | Mod: S$GLB,,, | Performed by: INTERNAL MEDICINE

## 2019-10-17 PROCEDURE — 99999 PR PBB SHADOW E&M-EST. PATIENT-LVL III: ICD-10-PCS | Mod: PBBFAC,,, | Performed by: INTERNAL MEDICINE

## 2019-10-17 RX ORDER — CLOPIDOGREL BISULFATE 75 MG/1
75 TABLET ORAL ONCE
Status: CANCELLED | OUTPATIENT
Start: 2019-10-17 | End: 2019-10-17

## 2019-10-17 RX ORDER — SODIUM CHLORIDE 450 MG/100ML
INJECTION, SOLUTION INTRAVENOUS CONTINUOUS
Status: CANCELLED | OUTPATIENT
Start: 2019-10-17

## 2019-10-17 NOTE — PROGRESS NOTES
Subjective:    Patient ID:  Domingo Vaca is a 68 y.o. male who presents for follow-up of cad    HPI  Admitted to [facility] last month with chest pain, had stress test (+) for ischemia  Angiogram showed 3 v CAD, considered not a candidate for CABG  Ended up having PCI of LAD  He comes for follow up with moderate improvement in SOB. No chest pain      Review of Systems   Constitution: Negative for decreased appetite, malaise/fatigue, weight gain and weight loss.   Cardiovascular: Negative for chest pain, dyspnea on exertion, leg swelling, palpitations and syncope.   Respiratory: Negative for cough and shortness of breath.    Gastrointestinal: Negative.    Neurological: Negative for weakness.   All other systems reviewed and are negative.       Objective:      Physical Exam   Constitutional: He is oriented to person, place, and time. He appears well-developed and well-nourished.   HENT:   Head: Normocephalic.   Eyes: Pupils are equal, round, and reactive to light.   Neck: Normal range of motion. Neck supple. No JVD present. Carotid bruit is not present. No thyromegaly present.   Cardiovascular: Normal rate, regular rhythm, normal heart sounds, intact distal pulses and normal pulses. PMI is not displaced. Exam reveals no gallop.   No murmur heard.  Pulmonary/Chest: Effort normal and breath sounds normal.   Abdominal: Soft. Normal appearance. He exhibits no mass. There is no hepatosplenomegaly. There is no tenderness.   Musculoskeletal: Normal range of motion. He exhibits no edema.   Neurological: He is alert and oriented to person, place, and time. He has normal strength and normal reflexes. No sensory deficit.   Skin: Skin is warm and intact.   Psychiatric: He has a normal mood and affect.   Nursing note and vitals reviewed.        Assessment:       1. PAD (peripheral artery disease)    2. Coronary artery disease involving native coronary artery of native heart with angina pectoris    3. Essential hypertension    4.  Mixed hyperlipidemia         Plan:     PCI LCx, OM next week  Groin access

## 2019-10-17 NOTE — PATIENT INSTRUCTIONS
Angiogram    Arrive for procedure at: [facility] on 10/25/19. Your procedure is scheduled for 8am     You will receive a phone call from [facility] Pre-Op Department with further instructions prior to your scheduled procedure.    Notify the nurse if you are ALLERGIC TO IODINE.    FASTING: You MAY NOT have anything to eat or drink AFTER MIDNIGHT the day before your procedure. If your procedure is scheduled in the afternoon, you may have a LIGHT BREAKFAST 6-8 hours prior to your procedure.  For example: Two slices of toast; black coffee or black tea.    MEDICATIONS: You may take your regular morning medications with water. If there are any medications that you should not take, you will be instructed to hold them for that morning.    ? CARDIOLOGY PRE-PROCEDURE MEDICATION ORDERS:  ** Please hold any medications that are checked below:    HOLD   # OF DAYS TO HOLD  ? Metformin    Day before procedure & morning of procedure    CONTINUE the Following Medications   ? Plavix        ? Aspirin    WHAT TO EXPECT:    How long will the procedure take?  The procedure will take an average of 1 - 2 hours to perform.  After the procedure, you will need to lay flat for around 4 - 6 hours to minimize bleeding from the puncture site. If the wrist is accessed you will need to keep your arm still as instructed by the nurse.    When can I go home?  You may be able to be discharged home that same afternoon if there were no complications.  If you have one of the following: balloon; stent; pacemaker or defibrillator procedures, you may spend one night for observation.  Your doctor will determine your discharge based upon your progress.  The results of your procedure will be discussed with you before you are discharged.  Any further testing or procedures will be scheduled for you either before you leave or you will be instructed to call for a future appointment.      TRANSPORTATION:  PLEASE ARRANGE TO HAVE SOMEONE DRIVE YOU HOME  FOLLOWING YOUR PROCEDURE, YOU WILL NOT BE ALLOWED TO DRIVE.

## 2019-10-18 ENCOUNTER — PATIENT MESSAGE (OUTPATIENT)
Dept: NEUROLOGY | Facility: CLINIC | Age: 69
End: 2019-10-18

## 2019-10-21 ENCOUNTER — OFFICE VISIT (OUTPATIENT)
Dept: FAMILY MEDICINE | Facility: CLINIC | Age: 69
End: 2019-10-21
Payer: MEDICARE

## 2019-10-21 VITALS
OXYGEN SATURATION: 96 % | BODY MASS INDEX: 38.32 KG/M2 | HEIGHT: 68 IN | HEART RATE: 67 BPM | TEMPERATURE: 98 F | DIASTOLIC BLOOD PRESSURE: 72 MMHG | WEIGHT: 252.88 LBS | SYSTOLIC BLOOD PRESSURE: 126 MMHG

## 2019-10-21 DIAGNOSIS — G03.9 ARACHNOIDITIS: Primary | ICD-10-CM

## 2019-10-21 PROCEDURE — 99999 PR PBB SHADOW E&M-EST. PATIENT-LVL III: CPT | Mod: PBBFAC,,, | Performed by: FAMILY MEDICINE

## 2019-10-21 PROCEDURE — 3078F DIAST BP <80 MM HG: CPT | Mod: CPTII,S$GLB,, | Performed by: FAMILY MEDICINE

## 2019-10-21 PROCEDURE — 1101F PR PT FALLS ASSESS DOC 0-1 FALLS W/OUT INJ PAST YR: ICD-10-PCS | Mod: CPTII,S$GLB,, | Performed by: FAMILY MEDICINE

## 2019-10-21 PROCEDURE — 99214 OFFICE O/P EST MOD 30 MIN: CPT | Mod: S$GLB,,, | Performed by: FAMILY MEDICINE

## 2019-10-21 PROCEDURE — 1101F PT FALLS ASSESS-DOCD LE1/YR: CPT | Mod: CPTII,S$GLB,, | Performed by: FAMILY MEDICINE

## 2019-10-21 PROCEDURE — 3074F PR MOST RECENT SYSTOLIC BLOOD PRESSURE < 130 MM HG: ICD-10-PCS | Mod: CPTII,S$GLB,, | Performed by: FAMILY MEDICINE

## 2019-10-21 PROCEDURE — 99214 PR OFFICE/OUTPT VISIT, EST, LEVL IV, 30-39 MIN: ICD-10-PCS | Mod: S$GLB,,, | Performed by: FAMILY MEDICINE

## 2019-10-21 PROCEDURE — 3074F SYST BP LT 130 MM HG: CPT | Mod: CPTII,S$GLB,, | Performed by: FAMILY MEDICINE

## 2019-10-21 PROCEDURE — 99999 PR PBB SHADOW E&M-EST. PATIENT-LVL III: ICD-10-PCS | Mod: PBBFAC,,, | Performed by: FAMILY MEDICINE

## 2019-10-21 PROCEDURE — 3078F PR MOST RECENT DIASTOLIC BLOOD PRESSURE < 80 MM HG: ICD-10-PCS | Mod: CPTII,S$GLB,, | Performed by: FAMILY MEDICINE

## 2019-10-22 ENCOUNTER — PATIENT MESSAGE (OUTPATIENT)
Dept: FAMILY MEDICINE | Facility: CLINIC | Age: 69
End: 2019-10-22

## 2019-10-23 ENCOUNTER — PATIENT MESSAGE (OUTPATIENT)
Dept: NEUROLOGY | Facility: CLINIC | Age: 69
End: 2019-10-23

## 2019-10-23 ENCOUNTER — PATIENT MESSAGE (OUTPATIENT)
Dept: FAMILY MEDICINE | Facility: CLINIC | Age: 69
End: 2019-10-23

## 2019-10-23 RX ORDER — PREGABALIN 200 MG/1
200 CAPSULE ORAL 3 TIMES DAILY
Qty: 270 CAPSULE | Refills: 1 | Status: SHIPPED | OUTPATIENT
Start: 2019-10-23 | End: 2020-01-13 | Stop reason: SDUPTHER

## 2019-10-23 NOTE — PROGRESS NOTES
Subjective:       Patient ID: Domingo Vaca is a 69 y.o. male    Chief Complaint: Follow-up (3 mo)    HPI  Here today for interval evaluation  Patient with recent hospitalization for narcosis causing aspiration pneumonia and respiratory failure.  He was treated with Narcan.  During that time, Trileptal was started for arachnoiditis.  He has been able to wean off of all narcotics.      Review of Systems   Constitutional: Positive for activity change. Negative for unexpected weight change.   HENT: Negative for hearing loss, rhinorrhea and trouble swallowing.    Eyes: Negative for discharge and visual disturbance.   Respiratory: Negative for chest tightness and wheezing.    Cardiovascular: Negative for chest pain and palpitations.   Gastrointestinal: Negative for blood in stool, constipation, diarrhea and vomiting.   Endocrine: Negative for polydipsia and polyuria.   Genitourinary: Positive for urgency. Negative for difficulty urinating and hematuria.   Musculoskeletal: Positive for arthralgias and joint swelling. Negative for neck pain.   Neurological: Positive for weakness. Negative for headaches.   Psychiatric/Behavioral: Positive for dysphoric mood. Negative for confusion.        Objective:   Physical Exam   Constitutional: He appears well-developed and well-nourished.   HENT:   Head: Normocephalic and atraumatic.   Eyes: Pupils are equal, round, and reactive to light. EOM are normal.   Neck: Neck supple.   Cardiovascular: Normal rate, regular rhythm and normal heart sounds. Exam reveals no gallop and no friction rub.   No murmur heard.  Pulmonary/Chest: Effort normal and breath sounds normal. He has no wheezes. He has no rales.   Vitals reviewed.       Assessment:       1. Arachnoiditis          Plan:       Arachnoiditis  - Continue Neurology  - Continue current therapy  - Continue narcotic avoidance.  - Follow up in about 3 months (around 1/21/2020).

## 2019-10-28 ENCOUNTER — TELEPHONE (OUTPATIENT)
Dept: CARDIOLOGY | Facility: CLINIC | Age: 69
End: 2019-10-28

## 2019-10-28 ENCOUNTER — DOCUMENTATION ONLY (OUTPATIENT)
Dept: CARDIOLOGY | Facility: CLINIC | Age: 69
End: 2019-10-28

## 2019-10-28 ENCOUNTER — PATIENT MESSAGE (OUTPATIENT)
Dept: FAMILY MEDICINE | Facility: CLINIC | Age: 69
End: 2019-10-28

## 2019-10-28 ENCOUNTER — EDUCATION (OUTPATIENT)
Dept: CARDIOLOGY | Facility: CLINIC | Age: 69
End: 2019-10-28

## 2019-10-28 DIAGNOSIS — R07.9 CHEST PAIN, UNSPECIFIED TYPE: ICD-10-CM

## 2019-10-28 DIAGNOSIS — I25.10 CORONARY ARTERY DISEASE, ANGINA PRESENCE UNSPECIFIED, UNSPECIFIED VESSEL OR LESION TYPE, UNSPECIFIED WHETHER NATIVE OR TRANSPLANTED HEART: Primary | ICD-10-CM

## 2019-10-28 RX ORDER — DIPHENHYDRAMINE HCL 25 MG
50 CAPSULE ORAL ONCE
Status: CANCELLED | OUTPATIENT
Start: 2019-10-28 | End: 2019-10-28

## 2019-10-28 RX ORDER — SODIUM CHLORIDE 9 MG/ML
INJECTION, SOLUTION INTRAVENOUS CONTINUOUS
Status: CANCELLED | OUTPATIENT
Start: 2019-10-28

## 2019-10-28 NOTE — PROGRESS NOTES
Dr. Woo spoke to Dr. Aguayo regarding this patient.  Will admit to [facility]U on 11/05/19 @ 8am for PCI/Stent of LCX.  Spoke directly to patient and reviewed all instructions.  Also sent via patient portal.  All questions answered.  Will need labs and consents upon arrival.

## 2019-10-28 NOTE — TELEPHONE ENCOUNTER
Please contact pt for appointment for possible PCI of the marginal branch. Please see Dr Schofield case from 10/25.

## 2019-10-28 NOTE — TELEPHONE ENCOUNTER
----- Message from Krys Gordillo sent at 10/28/2019  7:33 AM CDT -----  Type: Needs Medical Advice    Who Called:  patient  Symptoms (please be specific):  na  How long has patient had these symptoms:  zoila  Pharmacy name and phone #:  [facility]  Best Call Back Number: [phone]  Additional Information: please call patient concerning this past Friday's angiogram

## 2019-10-28 NOTE — PROGRESS NOTES
OUTPATIENT CATHETERIZATION INSTRUCTIONS    You have been scheduled for a procedure in the catheterization lab on Tuesday, November 4, 2019.     Please report to the Cardiology Waiting Area on the Third floor of the hospital and check in at 8 AM.   You will then be taken to the SSCU (Short Stay Cardiac Unit) and prepared for your procedure. Please be aware that this is not the time of your procedure but the time you are to arrive. The procedures are scheduled on an hourly basis; however, emergency cases take precedence over all other cases.       You may not have anything to eat or drink after midnight the night before your test. You may take your regular morning medications with water. If there are any medications that you should not take you will be instructed to hold them that morning. If you are diabetic and on Metformin (Glucophage) do not take it the day before, the day of, and for 2 days after your procedure.      The procedure will take 1-2 hours to perform. After the procedure, you will return to SSCU on the third floor of the hospital. You will need to lie still (or keep your arm still) for the next 4 to 6 hours to minimize bleeding from the puncture site. Your family may remain in the room with you during this time.       You may be able to be discharged home that same afternoon if there is someone to drive you home and there were no complications. If you have one of the balloon, stent, or device procedures you may spend the night in the hospital. Your doctor will determine, based on your progress, the date and time of your discharge. The results of your procedure will be discussed with you before you are discharged. Any further testing or procedures will be scheduled for you either before you leave or you will be called with these appointments.       If you should have any questions, concerns, or need to change the date of your procedure, please call MARK Saenz @ [phone]    Special  Instructions:  Stay on Aspirin and Plavix        THE ABOVE INSTRUCTIONS WERE GIVEN TO THE PATIENT VERBALLY AND THEY VERBALIZED UNDERSTANDING.  THEY DO NOT REQUIRE ANY SPECIAL NEEDS AND DO NOT HAVE ANY LEARNING BARRIERS.          Directions for Reporting to Cardiology Waiting Area in the Hospital  If you park in the Parking Garage:  Take elevators to the1st floor of the parking garage.  Continue past the gift shop, coffee shop, and piano.  Take a right and go to the gold elevators. (Elevator B)  Take the elevator to the 3rd floor.  Follow the arrow on the sign on the wall that says Cath Lab Registration/EP Lab Registration.  Follow the long hallway all the way around until you come to a big open area.  This is the registration area.  Check in at Reception Desk.    OR    If family is dropping you off:  Have them drop you off at the front of the Hospital under the green overhang.  Enter through the doors and take a right.  Take the E elevators to the 3rd floor Cardiology Waiting Area.  Check in at the Reception Desk in the waiting room.

## 2019-10-30 ENCOUNTER — OFFICE VISIT (OUTPATIENT)
Dept: NEUROLOGY | Facility: CLINIC | Age: 69
End: 2019-10-30
Payer: MEDICARE

## 2019-10-30 VITALS
SYSTOLIC BLOOD PRESSURE: 138 MMHG | BODY MASS INDEX: 39.24 KG/M2 | WEIGHT: 250 LBS | HEART RATE: 78 BPM | HEIGHT: 67 IN | DIASTOLIC BLOOD PRESSURE: 77 MMHG | RESPIRATION RATE: 17 BRPM

## 2019-10-30 DIAGNOSIS — M48.062 LUMBAR STENOSIS WITH NEUROGENIC CLAUDICATION: ICD-10-CM

## 2019-10-30 DIAGNOSIS — Z96.89 SPINAL CORD STIMULATOR STATUS: Primary | ICD-10-CM

## 2019-10-30 DIAGNOSIS — G03.9 ARACHNOIDITIS: ICD-10-CM

## 2019-10-30 PROCEDURE — 1101F PT FALLS ASSESS-DOCD LE1/YR: CPT | Mod: CPTII,S$GLB,, | Performed by: PSYCHIATRY & NEUROLOGY

## 2019-10-30 PROCEDURE — 1101F PR PT FALLS ASSESS DOC 0-1 FALLS W/OUT INJ PAST YR: ICD-10-PCS | Mod: CPTII,S$GLB,, | Performed by: PSYCHIATRY & NEUROLOGY

## 2019-10-30 PROCEDURE — 99999 PR PBB SHADOW E&M-EST. PATIENT-LVL IV: CPT | Mod: PBBFAC,,, | Performed by: PSYCHIATRY & NEUROLOGY

## 2019-10-30 PROCEDURE — 99499 RISK ADDL DX/OHS AUDIT: ICD-10-PCS | Mod: S$GLB,,, | Performed by: PSYCHIATRY & NEUROLOGY

## 2019-10-30 PROCEDURE — 3075F PR MOST RECENT SYSTOLIC BLOOD PRESS GE 130-139MM HG: ICD-10-PCS | Mod: CPTII,S$GLB,, | Performed by: PSYCHIATRY & NEUROLOGY

## 2019-10-30 PROCEDURE — 99214 PR OFFICE/OUTPT VISIT, EST, LEVL IV, 30-39 MIN: ICD-10-PCS | Mod: S$GLB,,, | Performed by: PSYCHIATRY & NEUROLOGY

## 2019-10-30 PROCEDURE — 3075F SYST BP GE 130 - 139MM HG: CPT | Mod: CPTII,S$GLB,, | Performed by: PSYCHIATRY & NEUROLOGY

## 2019-10-30 PROCEDURE — 99214 OFFICE O/P EST MOD 30 MIN: CPT | Mod: S$GLB,,, | Performed by: PSYCHIATRY & NEUROLOGY

## 2019-10-30 PROCEDURE — 3078F DIAST BP <80 MM HG: CPT | Mod: CPTII,S$GLB,, | Performed by: PSYCHIATRY & NEUROLOGY

## 2019-10-30 PROCEDURE — 3078F PR MOST RECENT DIASTOLIC BLOOD PRESSURE < 80 MM HG: ICD-10-PCS | Mod: CPTII,S$GLB,, | Performed by: PSYCHIATRY & NEUROLOGY

## 2019-10-30 PROCEDURE — 99999 PR PBB SHADOW E&M-EST. PATIENT-LVL IV: ICD-10-PCS | Mod: PBBFAC,,, | Performed by: PSYCHIATRY & NEUROLOGY

## 2019-10-30 PROCEDURE — 99499 UNLISTED E&M SERVICE: CPT | Mod: S$GLB,,, | Performed by: PSYCHIATRY & NEUROLOGY

## 2019-10-30 NOTE — PATIENT INSTRUCTIONS
TESTING:  -- none     REFERRALS:  -- none     PREVENTION OF PAIN:   -- try RAISING trileptal from 600mg twice a day to 600mg in the morning and 900mg at night (1.5 tablets) x 5 days; if tolerating this and still need additional relief then raise to 900mg twice a day (max dose is 1200mg twice a day) - let me know when you need a refill, will not send one in until we know what dose you have reached   -- continue Lyrica 200mg three times per day at this time, in the FUTURE may raise   -- continue duloxetine 30mg daily     AS-NEEDED TREATMENT OF PAIN:  -- continue tylenol 1000mg three times per day as needed

## 2019-10-30 NOTE — PROGRESS NOTES
Date of service: 10/30/2019  Referring provider: No ref. provider found    Subjective:      Chief complaint: Pain       Patient ID: Domingo Vaca is a 69 y.o. gentleman with lumbar arachnoiditis, peripheral artery disease, type 2 diabetes, history of cervical fusion, history of lumbar fusion, history of left ankle fusion, CRPS of left lower extremity who is presenting for follow up of pain     History of Present Illness    INTERVAL HISTORY - 10/30/19     The last visit was 1 month ago at which point I started Trileptal.  In the interim he was admitted to [facility] from 9/26/19 to 10/1/19 for acute respiratory early failure, aspiration pneumonia, over narcosis, placement of drug eluting stent.  During the hospitalization his opiate regimen was changed.  Afterwards he message me that he was out of narcotics, they had not been helping anyway, we mutually decided to stop them.  He has been off opiates for 1 month and has been doing very well.  He reports there was no worsening of pain for doing so.  In the hospital the Trileptal was raise to 600 b.i.d. any reports it is helping 20% with his neuralgic pain down the legs. He is tolerating it without any side effects. He reports he does not think the Lyrica 200 mg t.i.d. is helping.    INTERVAL HISTORY - 9/24/19     He was last seen in the clinic approximately 5-6 months ago.     His chronic pain history is complicated, and in my opinion is representation of lumbar stenosis with neurogenic claudication, arachnoiditis, complex regional pain syndrome, diabetic polyneuropathy, facet arthropathy.  I made several recommendations for treating arachnoiditis pain. He was following with Dr. Lazaro Orantes.    In the interim, Dr. Orantes tried to rotate to Nucynta which was denied by his insurance.    He is currently on Lyrica 200 mg t.i.d..  In August Em Orantes raised his oxycodone to 15 mg 4 times a day.  He has not had any further procedures.    Today he is much worse. He reports his  pain is progressing. He is unable to walk, using wheelchair for this visit. At home he ambulates short distances with extreme pain, he reports Dr. Iqbal is trying to order a power wheelchair for this purpose.  Otherwise he has a walker.    He is in pain 100% of the time.  4-5 times per day he will have severe, sudden, episodes of stabbing and burning from the buttocks to the toes in the right leg, and the knee to the toes in the left leg.  The right buttocks is constantly sore.  Afterwards there is a remaining soreness in the legs.    The oxycodone 15mg is not helping at all, even when he takes 2-3 in a span of a couple hours.     He has developed mild urinary incontinence.    His current pain score is 10.       ORIGINAL PAIN HISTORY - 4/4/19   Age at onset and course over time:  He has a long history of chronic low back pain due to flat back, congenitally narrow canal. He had a previous L4-S1 PSIF with Dr Grimes in 2014 followed by SCS placement with Dr Navarro at [facility] in 2015. He has had 7 surgeries total. He had numerous epidural steroids in the past with long-term failure and he was only getting modest relief after the spinal cord stimulator.  Prior to surgery the pain was in the low back, radiating down his thighs into the calves bilaterally worse on the right.  He had no weakness or urinary symptoms.  Pain was interfering with his sleep, sitting, ambulation.    The pain became worse in late 2017. He had a posterior fusion at L2-S1 and the L2-4 decompression with Dr. Camp on 03/06/2018.  Post-op there was immediate left leg weakness and numbness. On exam in the hospital, there was absence of left patellar reflex and weak left knee extension more than left hip flexion. I thought this was a post-op femoral neuropathy from abdominal compression during surgery. Per Dr. Camp's note, there was intra-op left L4 nerve root injury due to scar tissue dissection.    In May 2018 the right leg pain returned suddenly.  He had a NCS/EMG 5/24/18 with Dr. Joyner which showed severe incomplete L4 radiculopathy and bilateral chronic S1 radiculopathy.     He did have bilateral stents in the lower extremities in 2016 so a CTA was done to check the patency.  The stents were patent.  There was residual peripheral arterial occlusive disease in the right common femoral artery and the right popliteal artery.  The left superficial femoral artery was occluded. Dr. Arroyo felt that this anatomically could not explain his pain.      Within the last year his morphine went from 30mg daily to 60mg daily. This did nothing. He consulted with Dr. Lazaro Orantes and had an intrathecal morphine trial. This was ineffective. Then tried dilaudid intrathecally without effect, I do not know the doses.      Left leg is numb from hip to knee. There is a burning pain in the left knee. He reports he can handle this, the problem is his right leg which has excruciating pain from the hip to the ankle. Muscles feel on fire. He had this before the surgery but now it feels more muscular. He reports that before, his muscles were not giving out on him.     He was admitted to [facility] with acute upper GI bleed causing hypovolemic and possible septic shock. Needed pressors and blood products. He had a GI ulcer.     Back pain is manageable with the stimulator.  The stimulator does also cover the left lower extremity for the CRPS.    Location:  Right buttocks, down the hamstring, into the calf and lateral ankle  Radiation:    Quality: numb burning type pain feels like a constant patricia horse pain   Severity: 9  Duration: constant with escalations after 5-10 min of walking   Frequency: daily   Alleviated by: laying down  Exacerbated by: sitting is better than walking, but both hurt. He has to sit down for a while to make it go away.   Associated with: right leg buckling; no right leg numbness, no swelling, no temperature change, no bowel or bladder  Sleep habits:    Current acute  treatment:  Tylenol 1000mg TID   (plavix)    Current prevention:  Trileptal 600 b.i.d.  Lyrica 200 mg 3 times per day  duloxetine 30mg daily - started 3/28/19   (cellexa)    Previously tried:   Oxycodone 15 mg #120 per month  -- levorphanol 2mg - no response   -- morphine ER 30mg BID   -- gabapentin 1200mg TID - ineffective  -- percocet 10mg/325mg - 1.5 - 2 hours of relief only; takes 3 to 4 per day     Procedural history:       Review of patient's allergies indicates:   Allergen Reactions    Glipizide Rash     Current Outpatient Medications   Medication Sig Dispense Refill    acetaminophen (TYLENOL) 500 MG tablet Take 1,000 mg by mouth every 6 (six) hours as needed for Pain.      amlodipine besylate (NORVASC ORAL) Take 5 mg by mouth every evening.       clopidogrel (PLAVIX) 75 mg tablet Take 1 tablet (75 mg total) by mouth once daily. 90 tablet 3    DULoxetine (CYMBALTA) 30 MG capsule Take 1 capsule by mouth once daily.       metFORMIN (GLUCOPHAGE-XR) 500 MG 24 hr tablet Take 1 tablet (500 mg total) by mouth daily with breakfast. 90 tablet 3    metoprolol tartrate (LOPRESSOR) 25 MG tablet TAKE 1 TABLET BY MOUTH TWICE DAILY FOR 14 DAYS (Patient taking differently: Take 25 mg by mouth 2 (two) times daily. ) 180 tablet 0    olmesartan (BENICAR) 40 MG tablet Take 1 tablet (40 mg total) by mouth once daily. 90 tablet 3    OXcarbazepine (TRILEPTAL) 600 MG Tab Take 1 tablet (600 mg total) by mouth 2 (two) times daily. 180 tablet 3    pregabalin (LYRICA) 200 MG Cap Take 1 capsule (200 mg total) by mouth 3 (three) times daily. 270 capsule 1    simvastatin (ZOCOR) 40 MG tablet Take 1 tablet (40 mg total) by mouth every evening. 90 tablet 3    tamsulosin (FLOMAX) 0.4 mg Cap TAKE 1 CAPSULE BY MOUTH EVERY DAY FOR 14 DAYS (Patient taking differently: 0.4 mg once daily. ) 90 capsule 0    aspirin (ECOTRIN) 81 MG EC tablet Take 1 tablet (81 mg total) by mouth once daily. for 14 days (Patient taking differently: Take 81  mg by mouth every evening. ) 14 tablet 0     No current facility-administered medications for this visit.        Past Medical History  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    Anticoagulant long-term use     Arthritis     Cervical stenosis of spine     Chronic cervical pain     Chronic lumbar pain     Claudication of both lower extremities     Coronary artery disease     1 stent     History of right common carotid artery stent placement     HTN (hypertension)     Hyperlipidemia     Lumbar facet arthropathy 02/2018    Lumbar stenosis with neurogenic claudication 02/2018    Obesity     PVD (peripheral vascular disease)     stents 1 left; 2 right    RSD lower limb     Left ankle    S/P insertion of spinal cord stimulator     not working    S/P lumbar spinal fusion     multiple       Past Surgical History  Past Surgical History:   Procedure Laterality Date    ABDOMINAL AORTOGRAPHY N/A 9/30/2019    Procedure: Aortogram, Abdominal;  Surgeon: Kael Woo MD;  Location: [facility] CATH;  Service: Cardiology;  Laterality: N/A;    ANGIOPLASTY      Aortagram with Runoff; stent leg    ANKLE SURGERY Left     Multiple surgeries; now fused    APPENDECTOMY      BACK SURGERY      7 total    BRONCHOSCOPY Right 3/19/2019    Procedure: Bronchoscopy;  Surgeon: Antony Coelho Jr., DO;  Location: [facility] ENDO;  Service: Pulmonary;  Laterality: Right;    CAROTID STENT Left     CARPAL TUNNEL RELEASE Bilateral     left twice; right once    CERVICAL FUSION      over 5 levels    COLONOSCOPY      CORONARY ANGIOGRAPHY N/A 9/27/2019    Procedure: ANGIOGRAM, CORONARY ARTERY;  Surgeon: Michael Ruiz MD;  Location: [facility] CATH;  Service: Cardiology;  Laterality: N/A;    ESOPHAGOGASTRODUODENOSCOPY N/A 3/16/2019    Procedure: EGD (ESOPHAGOGASTRODUODENOSCOPY)-in icu;  Surgeon: Angel Mckeon MD;  Location: [facility] ENDO;   Service: Endoscopy;  Laterality: N/A;    ESOPHAGOGASTRODUODENOSCOPY Left 3/18/2019    Procedure: EGD (ESOPHAGOGASTRODUODENOSCOPY)- in ICU on drip;  Surgeon: Angel Mckeon MD;  Location: [facility];  Service: Endoscopy;  Laterality: Left;    LEFT HEART CATHETERIZATION Left 9/27/2019    Procedure: Left heart cath;  Surgeon: Michael Ruiz MD;  Location: [facility] CATH;  Service: Cardiology;  Laterality: Left;    LEFT HEART CATHETERIZATION Right 9/30/2019    Procedure: Left heart cath;  Surgeon: Kael Woo MD;  Location: [facility] CATH;  Service: Cardiology;  Laterality: Right;    pain injection      Multiple    PERCUTANEOUS TRANSLUMINAL BALLOON ANGIOPLASTY OF CORONARY ARTERY  9/30/2019    Procedure: Angioplasty-coronary;  Surgeon: Kael Woo MD;  Location: [facility] CATH;  Service: Cardiology;;    POSTERIOR FUSION LUMBAR SPINE W/ CORPECTOMY      twice in last 18 mos (3/2016)    SPINAL CORD STIMULATOR IMPLANT  implanted 12/8/15    having great pain relief    SYMPATHECTOMY         Family History  Family History   Problem Relation Age of Onset    No Known Problems Mother     Heart disease Father     Early death Father 56        MI    Glaucoma Neg Hx        Social History  Social History     Socioeconomic History    Marital status:      Spouse name: Not on file    Number of children: Not on file    Years of education: Not on file    Highest education level: Not on file   Occupational History    Not on file   Social Needs    Financial resource strain: Somewhat hard    Food insecurity:     Worry: Often true     Inability: Often true    Transportation needs:     Medical: Yes     Non-medical: Yes   Tobacco Use    Smoking status: Former Smoker     Packs/day: 2.00     Years: 45.00     Pack years: 90.00     Types: Cigarettes     Last attempt to quit: 2009     Years since quitting: 10.8    Smokeless tobacco: Never Used    Tobacco comment: vapes with nicotine since 2009   Substance  and Sexual Activity    Alcohol use: No     Frequency: Never     Binge frequency: Never    Drug use: No    Sexual activity: Not Currently     Partners: Female   Lifestyle    Physical activity:     Days per week: 0 days     Minutes per session: 0 min    Stress: To some extent   Relationships    Social connections:     Talks on phone: Once a week     Gets together: Never     Attends Gnosticism service: Not on file     Active member of club or organization: No     Attends meetings of clubs or organizations: Never     Relationship status:    Other Topics Concern    Not on file   Social History Narrative    Not on file        Review of Systems  14-point review of systems as follows:   No check yomi indicates NEGATIVE response   Constitutional: [] weight loss, [] change to appetite   Eyes: [] change in vision, [] double vision   Ears, nose, mouth, throat: [] frequent nose bleeds, [] ringing in the ears   Respiratory: [] cough, [] wheezing   Cardiovascular: [] chest pain, [] palpitations   Gastrointestinal: [] jaundice, [] nausea/vomiting   Genitourinary: [] incontinence, [] burning with urination   Hematologic/lymphatic: [] easy bruising/bleeding, [] night sweats   Neurological: [] numbness, [] weakness   Endocrine: [] fatigue, [] heat/cold intolerance   Allergy/Immunologic: [] fevers, [] chills   Musculoskeletal: [] muscle pain, [] joint pain   Psychiatric: [] thoughts of harming self/others, [] depression   Integumentary: [] rashes, [] sores that do not heal     Objective:        Vitals:    10/30/19 1326   BP: 138/77   Pulse: 78   Resp: 17     Body mass index is 39.16 kg/m².     Appears calm and comfortable     4/4/19   Constitutional: appears in no acute distress, well-developed, well-nourished     Eyes: normal conjunctiva, PERRLA     Ears, nose, mouth, throat: external appearance of ears and nose normal, hearing intact     Cardiovascular: regular rate and rhythm, no murmurs appreciated    Respiratory:  unlabored respirations, breath sounds normal bilaterally    Gastrointestinal: no visible abdominal masses, no guarding, no visible hernia    Musculoskeletal:   All muscle groups of the upper extremities are 5/5  The right leg is without atrophy  The left leg shows muscle atrophy in the quads and below the knee  The left leg is with mottling below the knee, slight rubor in the foot, mild swelling of the foot, several healed ulcers, portions of the left leg or cooler than the right.  This is his baseline CRPS.  With full effort he does demonstrate short durations of full strength in all lower extremity muscle groups bilaterally except for the left ankle which is surgically fused  His gait is antalgic    Psychiatric: normal judgment and insight. Oriented to person, place, and time.     Neurologic:   Cortical functions: recent and remote memory intact, normal attention span and concentration, speech fluent, adequate fund of knowledge   Cranial nerves: EOMI, symmetric facial strength  Reflexes: 2+ in the upper and lower extremities, no Rojo  Sensation:    -- There is reduced sensation to temperature in the left C7 dermatome  -- there is loss of light touch, temperature, pinprick sensation in the entire left lower extremity without a dermatomal preference, proprioception and vibration are absent on left  -- there is preservation of light touch, temperature, pinprick in the entire right leg except for the dorsum of the foot.  Vibration is slightly reduced at the right toe, proprioception intact on the right  Coordination: normal    Data Review:     I have personally reviewed the referring provider's notes, labs, & imaging made available to me today.     RADIOLOGY STUDIES:  I have personally reviewed the pertinent images performed.     Results for orders placed or performed during the hospital encounter of 03/16/19   CT Head Without Contrast    Narrative    EXAMINATION:  CT HEAD WITHOUT CONTRAST    CPT: 58858    CLINICAL  HISTORY:  Confusion/delirium, altered LOC, unexplained;Syncope/fainting;.    TECHNIQUE:  Axial CT slices through the head were obtained without the administration of contrast.  Sagittal and coronal reconstructions were performed.  Automated exposure control was utilized.  Total DLP is approximately 674 mGy cm.    COMPARISON:  None available.    FINDINGS:  Mild-to-moderate generalized involutional changes are seen.  No evidence of acute intracranial hemorrhage, mass effect, midline deviation, hydrocephalus, or abnormal extra-axial fluid collection is visualized.  There appears to be  periventricular and deep white matter the hypoattenuation which is nonspecific, but is most commonly associated with chronic microvascular ischemic changes.  No evidence of acute large vessel territory ischemia/infarction is appreciated.  MRI with diffusion-weighted imaging is more sensitive in the assessment of acute ischemia/infarction.  The basilar cisterns are preserved. Minimal mucosal thickening in the lateral left maxillary sinus is seen.  The mastoid air cells appear to be grossly clear.  No acute displaced calvarial fracture is visualized.      Impression    1. No acute intracranial abnormality is visualized.      Electronically signed by: Ruslan Crowe MD  Date:    03/17/2019  Time:    12:31     04/5/2018 x-ray lumbar spine  Pedicle screws with janice fixation L2-L3-L4-L5 S1  Disc prosthesis L4-5 and L5-S1  Anterior listhesis of L2 on L3  Retrolisthesis of L3 on L4  Prior laminectomy  Facet arthropathy at multiple levels  Dorsal column stimulator at T9  No hardware complication    03/10/2018 myelogram with CT lumbar spine  -- soft tissue/longitudinal ligament thickening along the posterior vertebral body contours and extending caudally from L1 to the sacral elements.    -- This produces significant multilevel moderate to severe constriction of the thecal sac with noted crowding of the cauda equina.  These changes are most  significant at L1-2 through L3-4.    At L3-4, there is a large right asymmetric focal disc protrusion resulting in posterior and leftward displacement of the thecal sac and its contents.  Additionally, there is redemonstrated metallic structure adjacent to the left thecal sac contour in the region of the lateral recess at L4-5; this is unchanged in comparison to the prior.    Stable postoperative and extensive degenerative changes of the lumbar spine.  No evidence of organized fluid collection, acute spinal column disruption, or other significant interval alteration is appreciated.    Lab Results   Component Value Date     (L) 10/25/2019    K 5.2 (H) 10/25/2019    MG 2.5 03/16/2019    CL 92 (L) 10/25/2019    CO2 25 10/25/2019    BUN 14 10/25/2019    CREATININE 1.04 10/25/2019     (H) 10/25/2019    HGBA1C 7.4 (H) 09/25/2019    AST 69 (H) 09/25/2019    ALT 26 09/25/2019    ALBUMIN 4.2 09/25/2019    PROT 7.7 09/25/2019    BILITOT 0.7 09/25/2019    CHOL 96 (L) 09/26/2019    HDL 28 (L) 09/26/2019    LDLCALC 48.2 (L) 09/26/2019    TRIG 99 09/26/2019       Lab Results   Component Value Date    WBC 7.63 10/25/2019    HGB 12.1 (L) 10/25/2019    HCT 36.8 (L) 10/25/2019    MCV 80 (L) 10/25/2019     10/25/2019       Lab Results   Component Value Date    TSH 0.497 09/25/2019       Assessment & Plan:       Problem List Items Addressed This Visit        Neuro    Lumbar stenosis with neurogenic claudication    Overview     Status post multiple epidural steroid injections and seven back surgeries, most recent L2-S1 fusion 3/6/18          Spinal cord stimulator status - Primary    Overview     Controlling his back pain and left leg CRPS pain            ID    Arachnoiditis    Overview     Patient most likely has lumbar he has of arachnoiditis after multiple lumbar procedures in 7 surgeries  Most likely the cause of his severe, progressive, right lower extremity pain refractory to surgery (in fact worse after  surgery)    At initial consult we had discussed - use of neuro modulating opiate like Nucynta or methadone (nucynta was denied by insurance), lumbar sympathetic blocks, neuropathic intrathecal agents (bupivacaine, clonidine, Prialt)    His pain is showing opiate refractory qualities thus I do not think rotation to methadone is appropriate this point.  Rather, his worst pain is paroxysmal lumbar neuralgia, most appropriate medication would be Trileptal as this has excellent neuralgia coverage                     TESTING:  -- none     REFERRALS:  -- none     PREVENTION OF PAIN:   -- try RAISING trileptal from 600mg twice a day to 600mg in the morning and 900mg at night (1.5 tablets) x 5 days; if tolerating this and still need additional relief then raise to 900mg twice a day (max dose is 1200mg twice a day) - let me know when you need a refill, will not send one in until we know what dose you have reached   -- continue Lyrica 200mg three times per day at this time, in the FUTURE may raise   -- continue duloxetine 30mg daily     AS-NEEDED TREATMENT OF PAIN:  -- continue tylenol 1000mg three times per day as needed     Follow up in about 3 months (around 1/30/2020).      Marilia Jean MD

## 2019-11-04 ENCOUNTER — PATIENT MESSAGE (OUTPATIENT)
Dept: NEUROLOGY | Facility: CLINIC | Age: 69
End: 2019-11-04

## 2019-11-05 ENCOUNTER — HOSPITAL ENCOUNTER (OUTPATIENT)
Facility: HOSPITAL | Age: 69
Discharge: HOME | End: 2019-11-05
Attending: INTERNAL MEDICINE | Admitting: INTERNAL MEDICINE
Payer: MEDICARE

## 2019-11-05 VITALS
SYSTOLIC BLOOD PRESSURE: 108 MMHG | HEART RATE: 70 BPM | HEIGHT: 67 IN | OXYGEN SATURATION: 96 % | WEIGHT: 248 LBS | TEMPERATURE: 98 F | RESPIRATION RATE: 20 BRPM | DIASTOLIC BLOOD PRESSURE: 49 MMHG | BODY MASS INDEX: 38.92 KG/M2

## 2019-11-05 DIAGNOSIS — R07.9 CHEST PAIN, UNSPECIFIED TYPE: ICD-10-CM

## 2019-11-05 DIAGNOSIS — I25.10 CORONARY ARTERY DISEASE, ANGINA PRESENCE UNSPECIFIED, UNSPECIFIED VESSEL OR LESION TYPE, UNSPECIFIED WHETHER NATIVE OR TRANSPLANTED HEART: ICD-10-CM

## 2019-11-05 DIAGNOSIS — Z95.5 S/P DRUG ELUTING CORONARY STENT PLACEMENT: ICD-10-CM

## 2019-11-05 LAB
ABO + RH BLD: NORMAL
ANION GAP SERPL CALC-SCNC: 10 MMOL/L (ref 8–16)
APTT BLDCRRT: 28 SEC (ref 21–32)
BLD GP AB SCN CELLS X3 SERPL QL: NORMAL
BUN SERPL-MCNC: 10 MG/DL (ref 8–23)
CALCIUM SERPL-MCNC: 8.9 MG/DL (ref 8.7–10.5)
CHLORIDE SERPL-SCNC: 88 MMOL/L (ref 95–110)
CO2 SERPL-SCNC: 24 MMOL/L (ref 23–29)
CREAT SERPL-MCNC: 0.9 MG/DL (ref 0.5–1.4)
ERYTHROCYTE [DISTWIDTH] IN BLOOD BY AUTOMATED COUNT: 14.9 % (ref 11.5–14.5)
EST. GFR  (AFRICAN AMERICAN): >60 ML/MIN/1.73 M^2
EST. GFR  (NON AFRICAN AMERICAN): >60 ML/MIN/1.73 M^2
GLUCOSE SERPL-MCNC: 105 MG/DL (ref 70–110)
HCT VFR BLD AUTO: 35.5 % (ref 40–54)
HGB BLD-MCNC: 11.5 G/DL (ref 14–18)
INR PPP: 1 (ref 0.8–1.2)
MCH RBC QN AUTO: 26.2 PG (ref 27–31)
MCHC RBC AUTO-ENTMCNC: 32.4 G/DL (ref 32–36)
MCV RBC AUTO: 81 FL (ref 82–98)
PLATELET # BLD AUTO: 259 K/UL (ref 150–350)
PLATELET RESPONSE PLAVIX: 90 PRU (ref 194–418)
PMV BLD AUTO: 8.5 FL (ref 9.2–12.9)
POCT GLUCOSE: 119 MG/DL (ref 70–110)
POTASSIUM SERPL-SCNC: 4.7 MMOL/L (ref 3.5–5.1)
PROTHROMBIN TIME: 10.6 SEC (ref 9–12.5)
RBC # BLD AUTO: 4.39 M/UL (ref 4.6–6.2)
SODIUM SERPL-SCNC: 122 MMOL/L (ref 136–145)
WBC # BLD AUTO: 6.25 K/UL (ref 3.9–12.7)

## 2019-11-05 PROCEDURE — 85027 COMPLETE CBC AUTOMATED: CPT

## 2019-11-05 PROCEDURE — 85610 PROTHROMBIN TIME: CPT

## 2019-11-05 PROCEDURE — 27201423 OPTIME MED/SURG SUP & DEVICES STERILE SUPPLY: Performed by: INTERNAL MEDICINE

## 2019-11-05 PROCEDURE — 92933 PRQ TRLML C ATHRC ST ANGIOP1: CPT | Mod: LC,ICN,, | Performed by: INTERNAL MEDICINE

## 2019-11-05 PROCEDURE — 85730 THROMBOPLASTIN TIME PARTIAL: CPT

## 2019-11-05 PROCEDURE — 25500020 PHARM REV CODE 255: Performed by: INTERNAL MEDICINE

## 2019-11-05 PROCEDURE — 93010 EKG 12-LEAD: ICD-10-PCS | Mod: ,,, | Performed by: INTERNAL MEDICINE

## 2019-11-05 PROCEDURE — C1894 INTRO/SHEATH, NON-LASER: HCPCS | Performed by: INTERNAL MEDICINE

## 2019-11-05 PROCEDURE — C1874 STENT, COATED/COV W/DEL SYS: HCPCS | Performed by: INTERNAL MEDICINE

## 2019-11-05 PROCEDURE — 99152 MOD SED SAME PHYS/QHP 5/>YRS: CPT | Performed by: INTERNAL MEDICINE

## 2019-11-05 PROCEDURE — 25000003 PHARM REV CODE 250: Performed by: INTERNAL MEDICINE

## 2019-11-05 PROCEDURE — 63600175 PHARM REV CODE 636 W HCPCS: Performed by: INTERNAL MEDICINE

## 2019-11-05 PROCEDURE — 99153 MOD SED SAME PHYS/QHP EA: CPT | Performed by: INTERNAL MEDICINE

## 2019-11-05 PROCEDURE — C1887 CATHETER, GUIDING: HCPCS | Performed by: INTERNAL MEDICINE

## 2019-11-05 PROCEDURE — 93010 ELECTROCARDIOGRAM REPORT: CPT | Mod: ,,, | Performed by: INTERNAL MEDICINE

## 2019-11-05 PROCEDURE — C1753 CATH, INTRAVAS ULTRASOUND: HCPCS | Performed by: INTERNAL MEDICINE

## 2019-11-05 PROCEDURE — 93454 CORONARY ARTERY ANGIO S&I: CPT | Mod: 59 | Performed by: INTERNAL MEDICINE

## 2019-11-05 PROCEDURE — 99152 PR MOD CONSCIOUS SEDATION, SAME PHYS, 5+ YRS, FIRST 15 MIN: ICD-10-PCS | Mod: ,,, | Performed by: INTERNAL MEDICINE

## 2019-11-05 PROCEDURE — 92978 ENDOLUMINL IVUS OCT C 1ST: CPT | Mod: 26,ICN,, | Performed by: INTERNAL MEDICINE

## 2019-11-05 PROCEDURE — 85576 BLOOD PLATELET AGGREGATION: CPT

## 2019-11-05 PROCEDURE — 92921 HC PTCA , ADD'L BRANCH: CPT | Mod: LC | Performed by: INTERNAL MEDICINE

## 2019-11-05 PROCEDURE — C1724 CATH, TRANS ATHEREC,ROTATION: HCPCS | Performed by: INTERNAL MEDICINE

## 2019-11-05 PROCEDURE — 93454 PR CATH PLACE/CORONARY ANGIO, IMG SUPER/INTERP: ICD-10-PCS | Mod: 26,59,ICN, | Performed by: INTERNAL MEDICINE

## 2019-11-05 PROCEDURE — 92978 ENDOLUMINL IVUS OCT C 1ST: CPT

## 2019-11-05 PROCEDURE — 92921 PR PTCA, ADD'L VESSEL: ICD-10-PCS | Mod: LC,ICN,, | Performed by: INTERNAL MEDICINE

## 2019-11-05 PROCEDURE — 85347 COAGULATION TIME ACTIVATED: CPT | Performed by: INTERNAL MEDICINE

## 2019-11-05 PROCEDURE — 92921 PR PTCA, ADD'L VESSEL: CPT | Mod: LC,ICN,, | Performed by: INTERNAL MEDICINE

## 2019-11-05 PROCEDURE — 82962 GLUCOSE BLOOD TEST: CPT

## 2019-11-05 PROCEDURE — 93454 CORONARY ARTERY ANGIO S&I: CPT | Mod: 26,59,ICN, | Performed by: INTERNAL MEDICINE

## 2019-11-05 PROCEDURE — 99152 MOD SED SAME PHYS/QHP 5/>YRS: CPT | Mod: ,,, | Performed by: INTERNAL MEDICINE

## 2019-11-05 PROCEDURE — 93005 ELECTROCARDIOGRAM TRACING: CPT

## 2019-11-05 PROCEDURE — C9602 PERC D-E COR STENT ATHER S: HCPCS | Mod: LC | Performed by: INTERNAL MEDICINE

## 2019-11-05 PROCEDURE — 92978 PR IVUS, CORONARY, 1ST VESSEL: ICD-10-PCS | Mod: 26,ICN,, | Performed by: INTERNAL MEDICINE

## 2019-11-05 PROCEDURE — C1725 CATH, TRANSLUMIN NON-LASER: HCPCS | Performed by: INTERNAL MEDICINE

## 2019-11-05 PROCEDURE — 92933 PR STENT & ATHERECTOMY: ICD-10-PCS | Mod: LC,ICN,, | Performed by: INTERNAL MEDICINE

## 2019-11-05 PROCEDURE — 86850 RBC ANTIBODY SCREEN: CPT

## 2019-11-05 PROCEDURE — C1769 GUIDE WIRE: HCPCS | Performed by: INTERNAL MEDICINE

## 2019-11-05 PROCEDURE — 80048 BASIC METABOLIC PNL TOTAL CA: CPT

## 2019-11-05 PROCEDURE — 93005 ELECTROCARDIOGRAM TRACING: CPT | Mod: 59

## 2019-11-05 DEVICE — STENT RONYX40012UX RESOLUTE ONYX 4.00X12
Type: IMPLANTABLE DEVICE | Site: HEART | Status: FUNCTIONAL
Brand: RESOLUTE ONYX™

## 2019-11-05 DEVICE — STENT RONYX30012UX RESOLUTE ONYX 3.00X12
Type: IMPLANTABLE DEVICE | Site: HEART | Status: FUNCTIONAL
Brand: RESOLUTE ONYX™

## 2019-11-05 RX ORDER — HEPARIN SODIUM 200 [USP'U]/100ML
INJECTION, SOLUTION INTRAVENOUS
Status: DISCONTINUED | OUTPATIENT
Start: 2019-11-05 | End: 2019-11-05 | Stop reason: HOSPADM

## 2019-11-05 RX ORDER — ACETAMINOPHEN 325 MG/1
650 TABLET ORAL EVERY 4 HOURS PRN
Status: DISCONTINUED | OUTPATIENT
Start: 2019-11-05 | End: 2019-11-05 | Stop reason: HOSPADM

## 2019-11-05 RX ORDER — ONDANSETRON 2 MG/ML
4 INJECTION INTRAMUSCULAR; INTRAVENOUS EVERY 12 HOURS PRN
Status: DISCONTINUED | OUTPATIENT
Start: 2019-11-05 | End: 2019-11-05 | Stop reason: HOSPADM

## 2019-11-05 RX ORDER — SODIUM CHLORIDE 9 MG/ML
INJECTION, SOLUTION INTRAVENOUS CONTINUOUS
Status: DISCONTINUED | OUTPATIENT
Start: 2019-11-05 | End: 2019-11-05 | Stop reason: HOSPADM

## 2019-11-05 RX ORDER — LIDOCAINE HYDROCHLORIDE 20 MG/ML
INJECTION, SOLUTION INFILTRATION; PERINEURAL
Status: DISCONTINUED | OUTPATIENT
Start: 2019-11-05 | End: 2019-11-05 | Stop reason: HOSPADM

## 2019-11-05 RX ORDER — HEPARIN SODIUM 1000 [USP'U]/ML
INJECTION, SOLUTION INTRAVENOUS; SUBCUTANEOUS
Status: DISCONTINUED | OUTPATIENT
Start: 2019-11-05 | End: 2019-11-05 | Stop reason: HOSPADM

## 2019-11-05 RX ORDER — NITROGLYCERIN 5 MG/ML
INJECTION, SOLUTION INTRAVENOUS
Status: DISCONTINUED | OUTPATIENT
Start: 2019-11-05 | End: 2019-11-05 | Stop reason: HOSPADM

## 2019-11-05 RX ORDER — DIPHENHYDRAMINE HCL 50 MG
50 CAPSULE ORAL ONCE
Status: COMPLETED | OUTPATIENT
Start: 2019-11-05 | End: 2019-11-05

## 2019-11-05 RX ORDER — VERAPAMIL HYDROCHLORIDE 2.5 MG/ML
INJECTION, SOLUTION INTRAVENOUS
Status: DISCONTINUED | OUTPATIENT
Start: 2019-11-05 | End: 2019-11-05 | Stop reason: HOSPADM

## 2019-11-05 RX ORDER — MIDAZOLAM HYDROCHLORIDE 1 MG/ML
INJECTION, SOLUTION INTRAMUSCULAR; INTRAVENOUS
Status: DISCONTINUED | OUTPATIENT
Start: 2019-11-05 | End: 2019-11-05 | Stop reason: HOSPADM

## 2019-11-05 RX ORDER — FENTANYL CITRATE 50 UG/ML
INJECTION, SOLUTION INTRAMUSCULAR; INTRAVENOUS
Status: DISCONTINUED | OUTPATIENT
Start: 2019-11-05 | End: 2019-11-05 | Stop reason: HOSPADM

## 2019-11-05 RX ADMIN — SODIUM CHLORIDE: 0.9 INJECTION, SOLUTION INTRAVENOUS at 08:11

## 2019-11-05 RX ADMIN — DIPHENHYDRAMINE HYDROCHLORIDE 50 MG: 50 CAPSULE ORAL at 08:11

## 2019-11-05 NOTE — Clinical Note
Catheter is removed from the ostium   second OM artery.  Feeding and  care were discussed today and parent questions were answered

## 2019-11-05 NOTE — Clinical Note
150 ml injected throughout the case. 150 mL total wasted during the case. 300 mL total used in the case.

## 2019-11-05 NOTE — SUBJECTIVE & OBJECTIVE
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    Anticoagulant long-term use     Arthritis     Cervical stenosis of spine     Chronic cervical pain     Chronic lumbar pain     Claudication of both lower extremities     Coronary artery disease     1 stent     History of right common carotid artery stent placement     HTN (hypertension)     Hyperlipidemia     Lumbar facet arthropathy 02/2018    Lumbar stenosis with neurogenic claudication 02/2018    Obesity     PVD (peripheral vascular disease)     stents 1 left; 2 right    RSD lower limb     Left ankle    S/P insertion of spinal cord stimulator     not working    S/P lumbar spinal fusion     multiple       Past Surgical History:   Procedure Laterality Date    ABDOMINAL AORTOGRAPHY N/A 9/30/2019    Procedure: Aortogram, Abdominal;  Surgeon: Kael Woo MD;  Location: [facility] CATH;  Service: Cardiology;  Laterality: N/A;    ANGIOPLASTY      Aortagram with Runoff; stent leg    ANKLE SURGERY Left     Multiple surgeries; now fused    APPENDECTOMY      BACK SURGERY      7 total    BRONCHOSCOPY Right 3/19/2019    Procedure: Bronchoscopy;  Surgeon: Antony Coelho Jr., DO;  Location: [facility] ENDO;  Service: Pulmonary;  Laterality: Right;    CAROTID STENT Left     CARPAL TUNNEL RELEASE Bilateral     left twice; right once    CERVICAL FUSION      over 5 levels    COLONOSCOPY      CORONARY ANGIOGRAPHY N/A 9/27/2019    Procedure: ANGIOGRAM, CORONARY ARTERY;  Surgeon: Michael Ruiz MD;  Location: [facility] CATH;  Service: Cardiology;  Laterality: N/A;    ESOPHAGOGASTRODUODENOSCOPY N/A 3/16/2019    Procedure: EGD (ESOPHAGOGASTRODUODENOSCOPY)-in icu;  Surgeon: Angel Mckeon MD;  Location: [facility] ENDO;  Service: Endoscopy;  Laterality: N/A;    ESOPHAGOGASTRODUODENOSCOPY Left 3/18/2019    Procedure: EGD (ESOPHAGOGASTRODUODENOSCOPY)- in ICU on drip;  Surgeon:  Angel Mckeon MD;  Location: [facility] ENDO;  Service: Endoscopy;  Laterality: Left;    LEFT HEART CATHETERIZATION Left 9/27/2019    Procedure: Left heart cath;  Surgeon: Michael Ruiz MD;  Location: [facility] CATH;  Service: Cardiology;  Laterality: Left;    LEFT HEART CATHETERIZATION Right 9/30/2019    Procedure: Left heart cath;  Surgeon: Kael Woo MD;  Location: [facility] CATH;  Service: Cardiology;  Laterality: Right;    pain injection      Multiple    PERCUTANEOUS TRANSLUMINAL BALLOON ANGIOPLASTY OF CORONARY ARTERY  9/30/2019    Procedure: Angioplasty-coronary;  Surgeon: Kael Woo MD;  Location: [facility] CATH;  Service: Cardiology;;    POSTERIOR FUSION LUMBAR SPINE W/ CORPECTOMY      twice in last 18 mos (3/2016)    SPINAL CORD STIMULATOR IMPLANT  implanted 12/8/15    having great pain relief    SYMPATHECTOMY         Review of patient's allergies indicates:   Allergen Reactions    Glipizide Rash       PTA Medications   Medication Sig    acetaminophen (TYLENOL) 500 MG tablet Take 1,000 mg by mouth every 6 (six) hours as needed for Pain.    amlodipine besylate (NORVASC ORAL) Take 5 mg by mouth every evening.     aspirin (ECOTRIN) 81 MG EC tablet Take 1 tablet (81 mg total) by mouth once daily. for 14 days (Patient taking differently: Take 81 mg by mouth every evening. )    clopidogrel (PLAVIX) 75 mg tablet Take 1 tablet (75 mg total) by mouth once daily.    DULoxetine (CYMBALTA) 30 MG capsule Take 1 capsule by mouth once daily.     metFORMIN (GLUCOPHAGE-XR) 500 MG 24 hr tablet Take 1 tablet (500 mg total) by mouth daily with breakfast.    metoprolol tartrate (LOPRESSOR) 25 MG tablet TAKE 1 TABLET BY MOUTH TWICE DAILY FOR 14 DAYS (Patient taking differently: Take 25 mg by mouth 2 (two) times daily. )    olmesartan (BENICAR) 40 MG tablet Take 1 tablet (40 mg total) by mouth once daily.    OXcarbazepine (TRILEPTAL) 600 MG Tab Take 1 tablet (600 mg total) by mouth 2 (two) times  daily.    pregabalin (LYRICA) 200 MG Cap Take 1 capsule (200 mg total) by mouth 3 (three) times daily.    simvastatin (ZOCOR) 40 MG tablet Take 1 tablet (40 mg total) by mouth every evening.    tamsulosin (FLOMAX) 0.4 mg Cap TAKE 1 CAPSULE BY MOUTH EVERY DAY FOR 14 DAYS (Patient taking differently: 0.4 mg once daily. )     Family History     Problem Relation (Age of Onset)    Early death Father (56)    Heart disease Father    No Known Problems Mother        Tobacco Use    Smoking status: Former Smoker     Packs/day: 2.00     Years: 45.00     Pack years: 90.00     Types: Cigarettes     Last attempt to quit: 2009     Years since quitting: 10.8    Smokeless tobacco: Never Used    Tobacco comment: vapes with nicotine since 2009   Substance and Sexual Activity    Alcohol use: No     Frequency: Never     Binge frequency: Never    Drug use: No    Sexual activity: Not Currently     Partners: Female     Review of Systems   Constitution: Negative for chills, decreased appetite, diaphoresis, fever, malaise/fatigue, night sweats, weight gain and weight loss.   Eyes: Negative.    Cardiovascular: Negative for chest pain, irregular heartbeat, leg swelling, near-syncope, orthopnea, palpitations, paroxysmal nocturnal dyspnea and syncope.   Respiratory: Negative for cough, shortness of breath, sputum production and wheezing.    Endocrine: Negative.    Hematologic/Lymphatic: Negative for bleeding problem.   Skin: Negative for color change, flushing, rash and suspicious lesions.   Musculoskeletal: Negative.    Gastrointestinal: Negative for bloating, abdominal pain, change in bowel habit, constipation, diarrhea, heartburn, melena, nausea and vomiting.   Genitourinary: Negative for flank pain, frequency, hematuria, incomplete emptying and urgency.   Neurological: Negative for dizziness, focal weakness, headaches, light-headedness, numbness, paresthesias, seizures, sensory change and weakness.   Psychiatric/Behavioral: Negative  for altered mental status. The patient is not nervous/anxious.      Objective:     Vital Signs (Most Recent):    Vital Signs (24h Range):           There is no height or weight on file to calculate BMI.            No intake or output data in the 24 hours ending 11/05/19 0750    Lines/Drains/Airways     Peripheral Intravenous Line                 Peripheral IV - Double Lumen 10/25/19 0702 20 G Left 11 days                Physical Exam   Constitutional: He is oriented to person, place, and time. He appears well-developed and well-nourished. No distress.   HENT:   Head: Normocephalic and atraumatic.   Mouth/Throat: Oropharynx is clear and moist.   Eyes: Pupils are equal, round, and reactive to light. EOM are normal.   Neck: Normal range of motion. Neck supple. No JVD present.   Cardiovascular: Normal rate and regular rhythm. Exam reveals no gallop and no friction rub.   No murmur heard.  Pulmonary/Chest: Effort normal and breath sounds normal. No respiratory distress. He has no wheezes. He has no rales. He exhibits no tenderness.   Abdominal: Soft. Bowel sounds are normal. He exhibits no distension. There is no tenderness.   Musculoskeletal: Normal range of motion. He exhibits no edema.   Lymphadenopathy:     He has no cervical adenopathy.   Neurological: He is alert and oriented to person, place, and time.   Skin: Skin is warm and dry. No erythema.   Psychiatric: He has a normal mood and affect. His behavior is normal. Judgment and thought content normal.       Significant Labs: All pertinent lab results from the last 24 hours have been reviewed.    Significant Imaging: Reviewed in EPIC.

## 2019-11-05 NOTE — Clinical Note
The site was marked. Prepped: groin and right radial. The site was clipped. The patient was draped.

## 2019-11-05 NOTE — H&P
[facility]  Interventional Cardiology  H&P    Patient Name: Domingo Vaca  MRN: [id number]  Admission Date: 11/5/2019  Code Status: Prior   Attending Provider: Rivera Soto MD   Primary Care Physician: Marty Iqbal MD  Principal Problem:<principal problem not specified>    Patient information was obtained from patient and ER records.     Subjective:       HPI: Domingo Vaca is a 70 yo M with PMHx significant for CAD s/p PCI/LINDY to LAD (9/30/19), PAD s/p R SFA stent + R popliteal cutting balloon + L CI stent + L EI stent, HTN, DLD who is referred by Dr Woo for rotational atherectomy + PCI of LCx/OMB lesion.     Per chart review, patient was hospitalized with near syncope around 9/25/19 in setting of abnormal / positive nuclear stress test. He underwent LHC on 9/27/19 which showed multivessel disease (see report below). He was deemed inoperable by CTS due to his inactivity at baseline and concerns for postoperative recovery. As such patient underwent PCI to LAD with LINDY on 10/1 with Dr Woo. Due to persistent symptoms (ie SOB) patient underwent repeat LHC on 10/25 for PCI of LCx/OMB. Unfortunately Dr Woo was unable to pass balloon despite multiple wiring attempts and use of different guiding catheters due to presence of calcifications. [place] [facility].     Diagnostics:    EF 55-60% by echo 9/2019.     LHC 9/27/2019: PER REPORT:   Left Main artery:  There is distal 40% stenosis.     Left anterior descending artery:  There is an 80% calcified midvessel stenosis and otherwise only minimal disease.     Ramus intermedius:  There is an 80% calcified ostial lesion otherwise the vessel has only minimal luminal irregularities.     Left circumflex:  Large, dominant vessel.  Om 2 has an ostial 99% stenosis but otherwise there is only minimal disease throughout the circumflex.     Right coronary artery:  Small, non dominant vessel  that is 100% occluded proximally and fills via right to right bridging collaterals.    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    Anticoagulant long-term use     Arthritis     Cervical stenosis of spine     Chronic cervical pain     Chronic lumbar pain     Claudication of both lower extremities     Coronary artery disease     1 stent     History of right common carotid artery stent placement     HTN (hypertension)     Hyperlipidemia     Lumbar facet arthropathy 02/2018    Lumbar stenosis with neurogenic claudication 02/2018    Obesity     PVD (peripheral vascular disease)     stents 1 left; 2 right    RSD lower limb     Left ankle    S/P insertion of spinal cord stimulator     not working    S/P lumbar spinal fusion     multiple       Past Surgical History:   Procedure Laterality Date    ABDOMINAL AORTOGRAPHY N/A 9/30/2019    Procedure: Aortogram, Abdominal;  Surgeon: Kael Woo MD;  Location: [facility] CATH;  Service: Cardiology;  Laterality: N/A;    ANGIOPLASTY      Aortagram with Runoff; stent leg    ANKLE SURGERY Left     Multiple surgeries; now fused    APPENDECTOMY      BACK SURGERY      7 total    BRONCHOSCOPY Right 3/19/2019    Procedure: Bronchoscopy;  Surgeon: Antony Coelho Jr., DO;  Location: [facility] ENDO;  Service: Pulmonary;  Laterality: Right;    CAROTID STENT Left     CARPAL TUNNEL RELEASE Bilateral     left twice; right once    CERVICAL FUSION      over 5 levels    COLONOSCOPY      CORONARY ANGIOGRAPHY N/A 9/27/2019    Procedure: ANGIOGRAM, CORONARY ARTERY;  Surgeon: Michael Ruiz MD;  Location: [facility] CATH;  Service: Cardiology;  Laterality: N/A;    ESOPHAGOGASTRODUODENOSCOPY N/A 3/16/2019    Procedure: EGD (ESOPHAGOGASTRODUODENOSCOPY)-in icu;  Surgeon: Angel Mckeon MD;  Location: [facility] ENDO;  Service: Endoscopy;  Laterality: N/A;    ESOPHAGOGASTRODUODENOSCOPY Left  3/18/2019    Procedure: EGD (ESOPHAGOGASTRODUODENOSCOPY)- in ICU on drip;  Surgeon: Angel Mckeon MD;  Location: [facility];  Service: Endoscopy;  Laterality: Left;    LEFT HEART CATHETERIZATION Left 9/27/2019    Procedure: Left heart cath;  Surgeon: Michael Ruiz MD;  Location: [facility] CATH;  Service: Cardiology;  Laterality: Left;    LEFT HEART CATHETERIZATION Right 9/30/2019    Procedure: Left heart cath;  Surgeon: Kael Woo MD;  Location: [facility] CATH;  Service: Cardiology;  Laterality: Right;    pain injection      Multiple    PERCUTANEOUS TRANSLUMINAL BALLOON ANGIOPLASTY OF CORONARY ARTERY  9/30/2019    Procedure: Angioplasty-coronary;  Surgeon: Kael Woo MD;  Location: [facility] CATH;  Service: Cardiology;;    POSTERIOR FUSION LUMBAR SPINE W/ CORPECTOMY      twice in last 18 mos (3/2016)    SPINAL CORD STIMULATOR IMPLANT  implanted 12/8/15    having great pain relief    SYMPATHECTOMY         Review of patient's allergies indicates:   Allergen Reactions    Glipizide Rash       PTA Medications   Medication Sig    acetaminophen (TYLENOL) 500 MG tablet Take 1,000 mg by mouth every 6 (six) hours as needed for Pain.    amlodipine besylate (NORVASC ORAL) Take 5 mg by mouth every evening.     aspirin (ECOTRIN) 81 MG EC tablet Take 1 tablet (81 mg total) by mouth once daily. for 14 days (Patient taking differently: Take 81 mg by mouth every evening. )    clopidogrel (PLAVIX) 75 mg tablet Take 1 tablet (75 mg total) by mouth once daily.    DULoxetine (CYMBALTA) 30 MG capsule Take 1 capsule by mouth once daily.     metFORMIN (GLUCOPHAGE-XR) 500 MG 24 hr tablet Take 1 tablet (500 mg total) by mouth daily with breakfast.    metoprolol tartrate (LOPRESSOR) 25 MG tablet TAKE 1 TABLET BY MOUTH TWICE DAILY FOR 14 DAYS (Patient taking differently: Take 25 mg by mouth 2 (two) times daily. )    olmesartan (BENICAR) 40 MG tablet Take 1 tablet (40 mg total) by mouth once daily.     OXcarbazepine (TRILEPTAL) 600 MG Tab Take 1 tablet (600 mg total) by mouth 2 (two) times daily.    pregabalin (LYRICA) 200 MG Cap Take 1 capsule (200 mg total) by mouth 3 (three) times daily.    simvastatin (ZOCOR) 40 MG tablet Take 1 tablet (40 mg total) by mouth every evening.    tamsulosin (FLOMAX) 0.4 mg Cap TAKE 1 CAPSULE BY MOUTH EVERY DAY FOR 14 DAYS (Patient taking differently: 0.4 mg once daily. )     Family History     Problem Relation (Age of Onset)    Early death Father (56)    Heart disease Father    No Known Problems Mother        Tobacco Use    Smoking status: Former Smoker     Packs/day: 2.00     Years: 45.00     Pack years: 90.00     Types: Cigarettes     Last attempt to quit: 2009     Years since quitting: 10.8    Smokeless tobacco: Never Used    Tobacco comment: vapes with nicotine since 2009   Substance and Sexual Activity    Alcohol use: No     Frequency: Never     Binge frequency: Never    Drug use: No    Sexual activity: Not Currently     Partners: Female     Review of Systems   Constitution: Negative for chills, decreased appetite, diaphoresis, fever, malaise/fatigue, night sweats, weight gain and weight loss.   Eyes: Negative.    Cardiovascular: Negative for chest pain, irregular heartbeat, leg swelling, near-syncope, orthopnea, palpitations, paroxysmal nocturnal dyspnea and syncope.   Respiratory: Negative for cough, shortness of breath, sputum production and wheezing.    Endocrine: Negative.    Hematologic/Lymphatic: Negative for bleeding problem.   Skin: Negative for color change, flushing, rash and suspicious lesions.   Musculoskeletal: Negative.    Gastrointestinal: Negative for bloating, abdominal pain, change in bowel habit, constipation, diarrhea, heartburn, melena, nausea and vomiting.   Genitourinary: Negative for flank pain, frequency, hematuria, incomplete emptying and urgency.   Neurological: Negative for dizziness, focal weakness, headaches, light-headedness, numbness,  paresthesias, seizures, sensory change and weakness.   Psychiatric/Behavioral: Negative for altered mental status. The patient is not nervous/anxious.      Objective:     Vital Signs (Most Recent):    Vital Signs (24h Range):           There is no height or weight on file to calculate BMI.            No intake or output data in the 24 hours ending 11/05/19 0750    Lines/Drains/Airways     Peripheral Intravenous Line                 Peripheral IV - Double Lumen 10/25/19 0702 20 G Left 11 days                Physical Exam   Constitutional: He is oriented to person, place, and time. He appears well-developed and well-nourished. No distress.   HENT:   Head: Normocephalic and atraumatic.   Mouth/Throat: Oropharynx is clear and moist.   Eyes: Pupils are equal, round, and reactive to light. EOM are normal.   Neck: Normal range of motion. Neck supple. No JVD present.   Cardiovascular: Normal rate and regular rhythm. Exam reveals no gallop and no friction rub.   No murmur heard.  Pulmonary/Chest: Effort normal and breath sounds normal. No respiratory distress. He has no wheezes. He has no rales. He exhibits no tenderness.   Abdominal: Soft. Bowel sounds are normal. He exhibits no distension. There is no tenderness.   Musculoskeletal: Normal range of motion. He exhibits no edema.   Lymphadenopathy:     He has no cervical adenopathy.   Neurological: He is alert and oriented to person, place, and time.   Skin: Skin is warm and dry. No erythema.   Psychiatric: He has a normal mood and affect. His behavior is normal. Judgment and thought content normal.       Significant Labs: All pertinent lab results from the last 24 hours have been reviewed.    Significant Imaging: Reviewed in EPIC.    Assessment and Plan:     Coronary artery disease, multivessel  - Proceed with rotational atherectomy + PCI of LCx/OMB  - Anti-platelet Therapy: ASA 81 mg daily and Clopidogrel 75 mg daily  - Access: R radial  - Creatinine/CrCl: 1.0 on 10/25/2019  , AM labs today pending  - Allergies: No shellfish/Iodine allergy  - Pre-Hydration: 3 cc/kg/hr IV, continuous, for 1 hour, Pre-Procedure  - Pre-Op Med: Diphenhydramine (Benadryl) 50 mg, Oral, Once, Pre-Procedure   - All patient's questions were answered.  - The risks, benefits & alternatives of the procedure were explained to the patient.   - The risks of coronary angiography include but are not limited to: Bleeding, infection, heart rhythm abnormalities, allergic reactions, kidney injury requiring dilaysis, limb loss, stroke and death   - Should stenting be indicated, the patient has agreed to dual anti-platelet therapy for 1-consecutive year with a drug-eluting stent and a minimum of 1-month with the use of a bare metal stent  - Additionally, pt is aware that non-compliance is likely to result in stent clotting with heart attack, heart failure, and/or death  - The risks of moderate sedation include hypotension, respiratory depression, arrhythmias, bronchospasm, & death  - Informed consent was obtained & the patient is agreeable to proceed with the procedure      Josefina Daniels MD  Interventional Cardiology   [facility]

## 2019-11-05 NOTE — DISCHARGE INSTRUCTIONS
HOLD your METFORMIN...you may resume your Metformin on Thursday, 11/7/2019.  You may resume all other prescribed medications unless instructed differently by your doctor.        Angiogram discharge instructions:  -You may resume your previous diet immediately following procedure. If you should experience nausea or vomiting, try drinking clear liquids, then advance your diet as tolerated. You should drink a large amount of fluids for 24 hours and expect to have an increased urine output.    Puncture site care:  -You may have some bruising and/or tenderness around your incision.  -If you notice any bright red blood or swelling at the puncture site, place your fingers on the groin or arm and slightly above the puncture site and hold FIRM pressure for 15 minutes. Stop activity at this time and keep your leg or arm straight. If you are unable to stop the bleeding with firm pressure applied to the puncture site, call your nearest ambulance (Dial 911).  -If you notice slight oozing of blood at the site, apply pressure, reinforce with small dressing or BandAid, and decrease activity. If you are discharged on the day of your procedure, you should rest and only get up to go to the bathroom until the following morning.  -If you have the following new symptoms in the leg or arm, promptly notify your doctor:          Numbness or tingling         Pale (white) or cold skin         Sharp pain         Inability to move extremity (arm or leg)         Sudden or persistent drainage at the puncture site (pus)         Swelling or hard lump that has increased in size since hospital discharge         Temperature over 101 degrees    -You may remove your bandage in 24 hours.   -Shower for one week after procedure. If shower is not available, use a freshly cleaned tub and fill with shallow water below puncture site. Keep puncture site clean and dry.  -Clean and inspect puncture site daily until wound is healed.  -If a closure device had  been placed, please follow instructions on the handout given at discharge.    Activity:  -If your radial artery was used for your angiogram, do not lift anything heavier than a gallon of milk with the affected extremity for 5 days.  -If your groin area was used for your angiogram, when you cough or sneeze, sit down or stand up, hold the puncture site lightly with your fingers.  -Do not drive, operate machinery, cook, sign legal documents, and care for young children for 24 hours after procedure.  -Do not strain, lift heavy objects (10 lbs or more) or do deep knee bends for 1 week for groin sites. Gradually resume your previous activity.     Recovery After Procedural Sedation (Adult)  You have been given medicine by vein to make you sleep during your surgery. This may have included both a pain medicine and sleeping medicine. Most of the effects have worn off. But you may still have some drowsiness for the next 6 to 8 hours.    Home care  Follow these guidelines when you get home:  · For the next 8 hours, you should be watched by a responsible adult. This person should make sure your condition is not getting worse.  · Don't drink any alcohol for the next 24 hours.  · Don't drive, operate dangerous machinery, or make important business or personal decisions during the next 24 hours.    Note: Your healthcare provider may tell you not to take any medicine by mouth for pain or sleep in the next 4 hours. These medicines may react with the medicines you were given in the hospital. This could cause a much stronger response than usual.    Follow-up care  Follow up with your healthcare provider if you are not alert and back to your usual level of activity within 12 hours.    When to seek medical advice  Call your healthcare provider right away if any of these occur:  · Drowsiness gets worse  · Weakness or dizziness gets worse  · Repeated vomiting  · You can't be awakened

## 2019-11-05 NOTE — CONSULTS
"Cardiac Rehab     Domingo Vaca   [id number]   11/5/2019       Activity taught: No    Cardiac Rehab Phase Taught: Phase I & II    Risk Factors-Modifiable: nutrition, hyperlipidemia, hypertension, obesity, sedentary lifestyle, stress, exercise    Risk Factors-Non modifiable: age, gender    Teaching Method: Verbal, Written and Living with Heart Disease book.    Understanding/Response: Pt states he is unable to exercise due to a spinal condition. Pt not interested in cardiac rehab.     Comments: S/P PTCA. Discussed cardiac rehab and risk factor modification.  Educational materials were used in the process and given to the patient. They included "Your Guide to Living with Heart Disease" and a sample Mediterranean diet.    RAGENIS Castillo RN  Cardiac Rehab Nurse      "

## 2019-11-05 NOTE — PROCEDURES
"    Post Cath Note  Referring Physician: Rivera Soto MD  Procedure: INSERTION, STENT, CORONARY ARTERY (N/A), Angiogram, Coronary, with Left Heart Cath, Atherectomy-coronary (N/A), Stent, Drug Eluting, Single Vessel, Coronary, IVUS, Coronary       Access: Right radial    Findings:    Subtotal LCX/OMB lesion.     See full report for further details    Intervention:     S/P rotational atherectomy with 1.25 mm ed followed by PCI with LINDY to LCx x1 (4.0 x 12 mm) and ostial OM2 x1 (3.0 x 12 mm) with good angiographic result.      Closure device: Radial band    Post Cath Exam:   BP (!) 115/57 (BP Location: Right arm, Patient Position: Lying)   Pulse 62   Temp 97.8 °F (36.6 °C) (Oral)   Resp 18   Ht 5' 7" (1.702 m)   Wt 112.5 kg (248 lb)   SpO2 96%   BMI 38.84 kg/m²   No unusual pain, hematoma, thrill or bruit at vascular access site.  Distal pulse present without signs of ischemia.    Recommendations:   - Routine post-cath care  - IVF at 150 cc/hr for 4 hrs  - Cardiac rehab referral  - Continue medical management  - Risk factor reduction  - Plavix for at least 1 year and ASA 81 mg indefinitely  - Follow-up with outpatient cardiologist (Dr Woo)      Signed:  Josefina Daniels MD  Interventional Cardiology   11/5/2019 3:18 PM    "

## 2019-11-05 NOTE — PLAN OF CARE
Pt received from cath lab via stretcher.Vs stable.No c/o pain or discomfort.Right radial site without hematoma,positive pulses and no bleeding.Post cath instructions given and verbalized understanding.Family at bedside and pt oriented to room

## 2019-11-05 NOTE — DISCHARGE SUMMARY
[facility]  Interventional Cardiology  Discharge Summary      Patient Name: Domingo Vaca  MRN: [id number]  Admission Date: 11/5/2019  Hospital Length of Stay: 0 days  Discharge Date and Time:  11/05/2019 3:25 PM  Attending Physician: Rivera Soto MD  Discharging Provider: Josefina Daniels MD  Primary Care Physician: Marty Iqbal MD    HPI: Domingo Vaca is a 70 yo M with PMHx significant for CAD s/p PCI/LINDY to LAD (9/30/19), PAD s/p R SFA stent + R popliteal cutting balloon + L CI stent + L EI stent, HTN, DLD who is referred by Dr Woo for rotational atherectomy + PCI of LCx/OMB lesion.     Per chart review, patient was hospitalized with near syncope around 9/25/19 in setting of abnormal / positive nuclear stress test. He underwent LHC on 9/27/19 which showed multivessel disease (see report below). He was deemed inoperable by CTS due to his inactivity at baseline and concerns for postoperative recovery. As such patient underwent PCI to LAD with LINDY on 10/1 with Dr Woo. Due to persistent symptoms (ie SOB) patient underwent repeat LHC on 10/25 for PCI of LCx/OMB. Unfortunately Dr Woo was unable to pass balloon despite multiple wiring attempts and use of different guiding catheters due to presence of calcifications. [place] thus [place] [facility] [place].     Diagnostics:    EF 55-60% by echo 9/2019.     LHC 9/27/2019: PER REPORT:   Left Main artery:  There is distal 40% stenosis.     Left anterior descending artery:  There is an 80% calcified midvessel stenosis and otherwise only minimal disease.     Ramus intermedius:  There is an 80% calcified ostial lesion otherwise the vessel has only minimal luminal irregularities.     Left circumflex:  Large, dominant vessel.  Om 2 has an ostial 99% stenosis but otherwise there is only minimal disease throughout the circumflex.     Right coronary artery:  Small, non dominant vessel that is 100%  occluded proximally and fills via right to right bridging collaterals.    Procedure(s) (LRB):  INSERTION, STENT, CORONARY ARTERY (N/A)  Angiogram, Coronary, with Left Heart Cath  Atherectomy-coronary (N/A)  Stent, Drug Eluting, Single Vessel, Coronary  IVUS, Coronary     Indwelling Lines/Drains at time of discharge:  Lines/Drains/Airways     None                 Hospital Course: Patient underwent successful rotational atherectomy + PCI of LCX/OMB lesion via R radial artery on 11/5/2019 with Dr Aguayo. See full report in Epic for further details. Patient monitored post procedurally ; no complications observed. He was referred to Cardiac Rehab and continued on DAPT + BB + statin therapy. Patient to follow up with his primary / referring Cardiologist, Dr Woo.      Significant Diagnostic Studies: Labs:   BMP:   Recent Labs   Lab 11/05/19  0753      *   K 4.7   CL 88*   CO2 24   BUN 10   CREATININE 0.9   CALCIUM 8.9   , CBC   Recent Labs   Lab 11/05/19  0753   WBC 6.25   HGB 11.5*   HCT 35.5*       and INR   Lab Results   Component Value Date    INR 1.0 11/05/2019    INR 1.4 03/16/2019    INR 1.1 03/01/2019       Pending Diagnostic Studies:     Procedure Component Value Units Date/Time    EKG 12-LEAD on arrival to floor [[id number]]     Order Status:  Sent Lab Status:  No result         No new Assessment & Plan notes have been filed under this hospital service since the last note was generated.  Service: Interventional Cardiology      Discharged Condition: good    Follow Up:  Cardiology Clinic (Dr Woo)    Patient Instructions:      Type & Screen   Standing Status: Future Standing Exp. Date: 12/26/20     Medications:  Reconciled Home Medications:      Medication List      CHANGE how you take these medications    aspirin 81 MG EC tablet  Commonly known as:  ECOTRIN  Take 1 tablet (81 mg total) by mouth once daily. for 14 days  What changed:  when to take this     OXcarbazepine 600 MG  Tab  Commonly known as:  TRILEPTAL  Take 1 tablet (600 mg total) by mouth 2 (two) times daily.  What changed:  how much to take     tamsulosin 0.4 mg Cap  Commonly known as:  FLOMAX  TAKE 1 CAPSULE BY MOUTH EVERY DAY FOR 14 DAYS  What changed:  See the new instructions.        CONTINUE taking these medications    acetaminophen 500 MG tablet  Commonly known as:  TYLENOL  Take 1,000 mg by mouth every 6 (six) hours as needed for Pain.     clopidogrel 75 mg tablet  Commonly known as:  PLAVIX  Take 1 tablet (75 mg total) by mouth once daily.     DULoxetine 30 MG capsule  Commonly known as:  CYMBALTA  Take 1 capsule by mouth once daily.     metFORMIN 500 MG 24 hr tablet  Commonly known as:  GLUCOPHAGE-XR  Take 1 tablet (500 mg total) by mouth daily with breakfast.     metoprolol tartrate 25 MG tablet  Commonly known as:  LOPRESSOR  TAKE 1 TABLET BY MOUTH TWICE DAILY FOR 14 DAYS     NORVASC ORAL  Take 5 mg by mouth every evening.     olmesartan 40 MG tablet  Commonly known as:  BENICAR  Take 1 tablet (40 mg total) by mouth once daily.     pregabalin 200 MG Cap  Commonly known as:  LYRICA  Take 1 capsule (200 mg total) by mouth 3 (three) times daily.     simvastatin 40 MG tablet  Commonly known as:  ZOCOR  Take 1 tablet (40 mg total) by mouth every evening.            Time spent on the discharge of patient: 35 minutes    Josefina Daniels MD  Interventional Cardiology  [facility]

## 2019-11-05 NOTE — HPI
Samra Vaca is a 68 yo M with PMHx significant for CAD s/p PCI/LINDY to LAD (9/30/19), PAD s/p R SFA stent + R popliteal cutting balloon + L CI stent + L EI stent, HTN, DLD who is referred by Dr Woo for rotational atherectomy + PCI of LCx/OMB lesion.     Per chart review, patient was hospitalized with near syncope around 9/25/19 in setting of abnormal / positive nuclear stress test. He underwent LHC on 9/27/19 which showed multivessel disease (see report below). He was deemed inoperable by CTS due to his inactivity at baseline and concerns for postoperative recovery. As such patient underwent PCI to LAD with LINDY on 10/1 with Dr Woo. Due to persistent symptoms (ie SOB) patient underwent repeat LHC on 10/25 for PCI of LCx/OMB. Unfortunately Dr Woo was unable to pass balloon despite multiple wiring attempts and use of different guiding catheters due to presence of calcifications. [place] thus [place] [facility] [place].     Diagnostics:    EF 55-60% by echo 9/2019.     LHC 9/27/2019: PER REPORT:   Left Main artery:  There is distal 40% stenosis.     Left anterior descending artery:  There is an 80% calcified midvessel stenosis and otherwise only minimal disease.     Ramus intermedius:  There is an 80% calcified ostial lesion otherwise the vessel has only minimal luminal irregularities.     Left circumflex:  Large, dominant vessel.  Om 2 has an ostial 99% stenosis but otherwise there is only minimal disease throughout the circumflex.     Right coronary artery:  Small, non dominant vessel that is 100% occluded proximally and fills via right to right bridging collaterals.

## 2019-11-06 ENCOUNTER — PATIENT MESSAGE (OUTPATIENT)
Dept: NEUROLOGY | Facility: CLINIC | Age: 69
End: 2019-11-06

## 2019-11-06 LAB
POC ACTIVATED CLOTTING TIME K: 301 SEC (ref 74–137)
SAMPLE: ABNORMAL

## 2019-11-06 NOTE — TELEPHONE ENCOUNTER
Patient read the message, but did not respond. Patient's primary care doctor Dr. Iqbal's nurse faxed it to his pharmacy. Patient was suppose to check with is pharmacy for refill. Message re-sent to patient.

## 2019-11-07 ENCOUNTER — PATIENT MESSAGE (OUTPATIENT)
Dept: NEUROLOGY | Facility: CLINIC | Age: 69
End: 2019-11-07

## 2019-11-11 ENCOUNTER — PATIENT MESSAGE (OUTPATIENT)
Dept: FAMILY MEDICINE | Facility: CLINIC | Age: 69
End: 2019-11-11

## 2019-11-11 RX ORDER — OSELTAMIVIR PHOSPHATE 75 MG/1
75 CAPSULE ORAL 2 TIMES DAILY
Qty: 10 CAPSULE | Refills: 0 | Status: SHIPPED | OUTPATIENT
Start: 2019-11-11 | End: 2019-11-16

## 2019-12-04 ENCOUNTER — PATIENT MESSAGE (OUTPATIENT)
Dept: FAMILY MEDICINE | Facility: CLINIC | Age: 69
End: 2019-12-04

## 2019-12-13 ENCOUNTER — PATIENT MESSAGE (OUTPATIENT)
Dept: FAMILY MEDICINE | Facility: CLINIC | Age: 69
End: 2019-12-13

## 2019-12-13 ENCOUNTER — OFFICE VISIT (OUTPATIENT)
Dept: FAMILY MEDICINE | Facility: CLINIC | Age: 69
End: 2019-12-13
Payer: MEDICARE

## 2019-12-13 VITALS
OXYGEN SATURATION: 96 % | BODY MASS INDEX: 38.92 KG/M2 | TEMPERATURE: 99 F | HEART RATE: 67 BPM | HEIGHT: 67 IN | WEIGHT: 248 LBS | SYSTOLIC BLOOD PRESSURE: 138 MMHG | DIASTOLIC BLOOD PRESSURE: 80 MMHG

## 2019-12-13 DIAGNOSIS — R13.10 DYSPHAGIA, UNSPECIFIED TYPE: Primary | ICD-10-CM

## 2019-12-13 DIAGNOSIS — R13.10 DYSPHAGIA, UNSPECIFIED TYPE: ICD-10-CM

## 2019-12-13 PROCEDURE — 99214 PR OFFICE/OUTPT VISIT, EST, LEVL IV, 30-39 MIN: ICD-10-PCS | Mod: S$GLB,,, | Performed by: NURSE PRACTITIONER

## 2019-12-13 PROCEDURE — 99999 PR PBB SHADOW E&M-EST. PATIENT-LVL IV: ICD-10-PCS | Mod: PBBFAC,,, | Performed by: NURSE PRACTITIONER

## 2019-12-13 PROCEDURE — 99999 PR PBB SHADOW E&M-EST. PATIENT-LVL IV: CPT | Mod: PBBFAC,,, | Performed by: NURSE PRACTITIONER

## 2019-12-13 PROCEDURE — 99214 OFFICE O/P EST MOD 30 MIN: CPT | Mod: S$GLB,,, | Performed by: NURSE PRACTITIONER

## 2019-12-13 RX ORDER — PANTOPRAZOLE SODIUM 20 MG/1
TABLET, DELAYED RELEASE ORAL
Qty: 180 TABLET | Refills: 0 | OUTPATIENT
Start: 2019-12-13

## 2019-12-13 RX ORDER — PANTOPRAZOLE SODIUM 20 MG/1
20 TABLET, DELAYED RELEASE ORAL 2 TIMES DAILY
Qty: 60 TABLET | Refills: 1 | Status: SHIPPED | OUTPATIENT
Start: 2019-12-13 | End: 2020-01-13 | Stop reason: SDUPTHER

## 2019-12-13 NOTE — PROGRESS NOTES
Subjective:       Patient ID: Domingo Vaca is a 69 y.o. male.    Chief Complaint: Cough    HPI   Mr. Vaca is a new patient to me. He presents today for trouble swallowing. reports onset ~1 month ago. Has coughing fits from swallowing both food or water. Denies globus sensation. Denies heartburn. Had UGIB in March---was put on pantoprazole 40mg BID. Per chart review it looks like he stopped it around 2 months ago. He denies bloody/dark stools, abdominal pain, N/V. He denies SOB, wheezing, or sputum production   Vitals:    12/13/19 1238   BP: 138/80   Pulse: 67   Temp: 98.5 °F (36.9 °C)     Review of Systems   Constitutional: Negative for chills, diaphoresis and fever.   HENT: Positive for trouble swallowing. Negative for ear pain, facial swelling, postnasal drip, rhinorrhea and sore throat.    Eyes: Negative for discharge and redness.   Respiratory: Positive for cough. Negative for shortness of breath and wheezing.    Cardiovascular: Negative for chest pain and palpitations.   Gastrointestinal: Negative for abdominal pain, anal bleeding and blood in stool.   Genitourinary: Negative for difficulty urinating.   Musculoskeletal: Negative for gait problem and myalgias.   Skin: Negative for pallor and rash.   Neurological: Negative for facial asymmetry, speech difficulty and headaches.   Psychiatric/Behavioral: Negative for confusion. The patient is not nervous/anxious.        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    Anticoagulant long-term use     Arthritis     Cervical stenosis of spine     Chronic cervical pain     Chronic lumbar pain     Claudication of both lower extremities     Coronary artery disease     1 stent     History of right common carotid artery stent placement     HTN (hypertension)     Hyperlipidemia     Lumbar facet arthropathy 02/2018    Lumbar stenosis with neurogenic claudication 02/2018     Obesity     PVD (peripheral vascular disease)     stents 1 left; 2 right    RSD lower limb     Left ankle    S/P insertion of spinal cord stimulator     not working    S/P lumbar spinal fusion     multiple     Objective:      Physical Exam   Constitutional: He is oriented to person, place, and time. He does not have a sickly appearance. No distress.   HENT:   Head: Normocephalic.   Right Ear: Hearing normal.   Left Ear: Hearing normal.   Nose: Nose normal.   Mouth/Throat: Uvula is midline, oropharynx is clear and moist and mucous membranes are normal.   Eyes: Conjunctivae and lids are normal.   Neck: No JVD present. No tracheal deviation present.   Cardiovascular: Normal rate and normal heart sounds.   Pulmonary/Chest: Effort normal and breath sounds normal.   Abdominal: He exhibits no distension. There is no tenderness.   Musculoskeletal: He exhibits no deformity.   Neurological: He is alert and oriented to person, place, and time.   Skin: He is not diaphoretic. No pallor.   Psychiatric: He has a normal mood and affect. His speech is normal and behavior is normal. Judgment and thought content normal. Cognition and memory are normal.   Nursing note and vitals reviewed.      Assessment:       1. Dysphagia, unspecified type        Plan:       Dysphagia, unspecified type  -     Ambulatory referral to Gastroenterology  -     pantoprazole (PROTONIX) 20 MG tablet; Take 1 tablet (20 mg total) by mouth 2 (two) times daily.  Dispense: 60 tablet; Refill: 1    educated on supportive care, return precautions  FU 1-2 weeks with GI. EGD if no improvement       Medication List with Changes/Refills   New Medications    PANTOPRAZOLE (PROTONIX) 20 MG TABLET    Take 1 tablet (20 mg total) by mouth 2 (two) times daily.   Current Medications    ACETAMINOPHEN (TYLENOL) 500 MG TABLET    Take 1,000 mg by mouth every 6 (six) hours as needed for Pain.    AMLODIPINE BESYLATE (NORVASC ORAL)    Take 5 mg by mouth every evening.      ASPIRIN (ECOTRIN) 81 MG EC TABLET    Take 1 tablet (81 mg total) by mouth once daily. for 14 days    CLOPIDOGREL (PLAVIX) 75 MG TABLET    Take 1 tablet (75 mg total) by mouth once daily.    DULOXETINE (CYMBALTA) 30 MG CAPSULE    Take 1 capsule by mouth once daily.     METFORMIN (GLUCOPHAGE-XR) 500 MG 24 HR TABLET    Take 1 tablet (500 mg total) by mouth daily with breakfast.    METOPROLOL TARTRATE (LOPRESSOR) 25 MG TABLET    TAKE 1 TABLET BY MOUTH TWICE DAILY FOR 14 DAYS    OLMESARTAN (BENICAR) 40 MG TABLET    Take 1 tablet (40 mg total) by mouth once daily.    OXCARBAZEPINE (TRILEPTAL) 600 MG TAB    Take 1 tablet (600 mg total) by mouth 2 (two) times daily.    PREGABALIN (LYRICA) 200 MG CAP    Take 1 capsule (200 mg total) by mouth 3 (three) times daily.    SIMVASTATIN (ZOCOR) 40 MG TABLET    Take 1 tablet (40 mg total) by mouth every evening.    TAMSULOSIN (FLOMAX) 0.4 MG CAP    TAKE 1 CAPSULE BY MOUTH EVERY DAY FOR 14 DAYS

## 2019-12-17 ENCOUNTER — OFFICE VISIT (OUTPATIENT)
Dept: GASTROENTEROLOGY | Facility: CLINIC | Age: 69
End: 2019-12-17
Payer: MEDICARE

## 2019-12-17 ENCOUNTER — TELEPHONE (OUTPATIENT)
Dept: GASTROENTEROLOGY | Facility: CLINIC | Age: 69
End: 2019-12-17

## 2019-12-17 ENCOUNTER — LAB VISIT (OUTPATIENT)
Dept: LAB | Facility: HOSPITAL | Age: 69
End: 2019-12-17
Attending: NURSE PRACTITIONER
Payer: MEDICARE

## 2019-12-17 VITALS
HEART RATE: 71 BPM | SYSTOLIC BLOOD PRESSURE: 140 MMHG | HEIGHT: 67 IN | WEIGHT: 248 LBS | BODY MASS INDEX: 38.92 KG/M2 | DIASTOLIC BLOOD PRESSURE: 70 MMHG

## 2019-12-17 DIAGNOSIS — Z86.2 HISTORY OF ANEMIA: ICD-10-CM

## 2019-12-17 DIAGNOSIS — R74.01 ELEVATED AST (SGOT): ICD-10-CM

## 2019-12-17 DIAGNOSIS — Z95.5 HISTORY OF CORONARY ARTERY STENT PLACEMENT: ICD-10-CM

## 2019-12-17 DIAGNOSIS — Z79.01 ANTICOAGULANT LONG-TERM USE: ICD-10-CM

## 2019-12-17 DIAGNOSIS — Z87.19 HISTORY OF ESOPHAGEAL ULCER: ICD-10-CM

## 2019-12-17 DIAGNOSIS — R05.9 COUGH: ICD-10-CM

## 2019-12-17 DIAGNOSIS — R13.14 PHARYNGOESOPHAGEAL DYSPHAGIA: Primary | ICD-10-CM

## 2019-12-17 LAB
ALBUMIN SERPL BCP-MCNC: 4 G/DL (ref 3.5–5.2)
ALP SERPL-CCNC: 77 U/L (ref 55–135)
ALT SERPL W/O P-5'-P-CCNC: 17 U/L (ref 10–44)
AST SERPL-CCNC: 16 U/L (ref 10–40)
BILIRUB DIRECT SERPL-MCNC: 0.1 MG/DL (ref 0.1–0.3)
BILIRUB SERPL-MCNC: 0.3 MG/DL (ref 0.1–1)
PROT SERPL-MCNC: 7.3 G/DL (ref 6–8.4)

## 2019-12-17 PROCEDURE — 99499 UNLISTED E&M SERVICE: CPT | Mod: S$GLB,,, | Performed by: NURSE PRACTITIONER

## 2019-12-17 PROCEDURE — 3077F PR MOST RECENT SYSTOLIC BLOOD PRESSURE >= 140 MM HG: ICD-10-PCS | Mod: CPTII,S$GLB,, | Performed by: NURSE PRACTITIONER

## 2019-12-17 PROCEDURE — 1101F PT FALLS ASSESS-DOCD LE1/YR: CPT | Mod: CPTII,S$GLB,, | Performed by: NURSE PRACTITIONER

## 2019-12-17 PROCEDURE — 1126F PR PAIN SEVERITY QUANTIFIED, NO PAIN PRESENT: ICD-10-PCS | Mod: S$GLB,,, | Performed by: NURSE PRACTITIONER

## 2019-12-17 PROCEDURE — 1126F AMNT PAIN NOTED NONE PRSNT: CPT | Mod: S$GLB,,, | Performed by: NURSE PRACTITIONER

## 2019-12-17 PROCEDURE — 99214 PR OFFICE/OUTPT VISIT, EST, LEVL IV, 30-39 MIN: ICD-10-PCS | Mod: S$GLB,,, | Performed by: NURSE PRACTITIONER

## 2019-12-17 PROCEDURE — 99999 PR PBB SHADOW E&M-EST. PATIENT-LVL V: CPT | Mod: PBBFAC,,, | Performed by: NURSE PRACTITIONER

## 2019-12-17 PROCEDURE — 3077F SYST BP >= 140 MM HG: CPT | Mod: CPTII,S$GLB,, | Performed by: NURSE PRACTITIONER

## 2019-12-17 PROCEDURE — 3078F PR MOST RECENT DIASTOLIC BLOOD PRESSURE < 80 MM HG: ICD-10-PCS | Mod: CPTII,S$GLB,, | Performed by: NURSE PRACTITIONER

## 2019-12-17 PROCEDURE — 80076 HEPATIC FUNCTION PANEL: CPT

## 2019-12-17 PROCEDURE — 3078F DIAST BP <80 MM HG: CPT | Mod: CPTII,S$GLB,, | Performed by: NURSE PRACTITIONER

## 2019-12-17 PROCEDURE — 1159F MED LIST DOCD IN RCRD: CPT | Mod: S$GLB,,, | Performed by: NURSE PRACTITIONER

## 2019-12-17 PROCEDURE — 99214 OFFICE O/P EST MOD 30 MIN: CPT | Mod: S$GLB,,, | Performed by: NURSE PRACTITIONER

## 2019-12-17 PROCEDURE — 1101F PR PT FALLS ASSESS DOC 0-1 FALLS W/OUT INJ PAST YR: ICD-10-PCS | Mod: CPTII,S$GLB,, | Performed by: NURSE PRACTITIONER

## 2019-12-17 PROCEDURE — 99499 RISK ADDL DX/OHS AUDIT: ICD-10-PCS | Mod: S$GLB,,, | Performed by: NURSE PRACTITIONER

## 2019-12-17 PROCEDURE — 36415 COLL VENOUS BLD VENIPUNCTURE: CPT | Mod: PO

## 2019-12-17 PROCEDURE — 1159F PR MEDICATION LIST DOCUMENTED IN MEDICAL RECORD: ICD-10-PCS | Mod: S$GLB,,, | Performed by: NURSE PRACTITIONER

## 2019-12-17 PROCEDURE — 99999 PR PBB SHADOW E&M-EST. PATIENT-LVL V: ICD-10-PCS | Mod: PBBFAC,,, | Performed by: NURSE PRACTITIONER

## 2019-12-17 NOTE — PATIENT INSTRUCTIONS
Discharge Instructions: Eating a Soft Diet  You have been prescribed a soft diet (also called gastrointestinal soft diet or bland diet). This reduces the amount of work your digestive tract has to do. It also reduces the chance that your digestive tract will be irritated by the food you eat. A soft diet is prescribed for people with digestive problems. The diet consists of foods that are tender, mildly seasoned, and easy to digest. While on this diet, you should not eat fried or spicy foods, or raw fruits and vegetables. Also avoid alcoholic beverages.  General guidelines  · Eat in a calm, relaxed atmosphere. How you eat may be as important as what you eat. Dont rush while eating. Chew your food slowly and thoroughly, and swallow slowly.  · Eat small frequent meals throughout the day, but dont eat within 2 hours of bedtime.  · Avoid any foods that cause discomfort.  · Dont use NSAIDs (nonsteroidal anti-inflammatory drugs), such as aspirin, and ibuprofen. Also avoid medicine that contain aspirin. NSAIDs can cause ulcers and delay or prevent ulcer healing.  · Use antacids as needed, but keep in mind that magnesium-containing antacids may cause diarrhea.  Foods to eat  · Cream of wheat and cream of rice  · Cooked white rice  · Mashed potatoes, and boiled potatoes without skin  · Plain pasta and noodles  · Plain white crackers (such as no-salt soda crackers)  · White bread  · Applesauce  · Cooked fruits without skins or seeds  · Mild juices, such as apple and grape  · Bananas  · Cooked or mashed vegetables without stems and seeds  ? Carrots  ? Summer squash (zucchini, yellow squash)  ? Winter squash (acorn, butternut, spaghetti squash)  · Cottage cheese  · Mild hard or soft cheeses  · Custard  · Yogurt without seeds or nuts  · Milk (you may need lactose-free milk)  · Ice cream without seeds or nuts  · Smooth peanut butter  · Eggs  · Fish, turkey, chicken, or other meat that is not tough or stringy  · Tofu  Foods to  avoid  · Nuts and seeds  · Snack foods, such as the following:  ? Chocolate-containing snacks, candy, pastries, or cakes.  ? Potato chips (plain, barbecued, or other flavors)  ? Taco chips or nachos  ? Corn chips  ? Popcorn, popcorn cakes, or rice cakes  ? Crackers with nuts, seeds, or spicy seasonings  ? French fries  · Fried or greasy foods  · Whole-grain breads, rolls, and crackers  · Breads and rolls with nuts, seeds, or bran  · Bran and granola cereals  · Berries with seeds, such as strawberries, raspberries, and blackberries  · Acidic fruits, such as oranges, grapefruits, alirio, limes, and pineapples  · Raw vegetables  · Mild or hot peppers  · Sauerkraut and pickled vegetables  · Tomatoes or tomato products, such as tomato paste, tomato sauce, and tomato juice  · Barbecue sauce  · Spicy or flavored cheeses, such as jalapeño and black pepper cheese  · Crunchy peanut butter  · Dried cooked beans, such as sparks, kidney, or navy beans  · The following meats:  ? Fried or greasy meats  ? Processed, spicy meats, such as sausage, rodríguez, ham, and lunch meats  ? Ribs and other meats with barbecue sauce  ? Tough or stringy meats, such as corned beef or beef jerky  Fluids to avoid  · Alcoholic beverages  · Coffee and regular teas  · Jane and other drinks with caffeine  · Cranberry, orange, pineapple, and grapefruit juice  · Lemonade  · Vegetable juice  · Whole milk, if you are lactose intolerant  Follow-up  Make a follow-up appointment with a dietitian as directed by our staff.  Date Last Reviewed: 6/21/2015  © 8795-5819 [employer]. [address] [place] [address], [place] [address]. All rights reserved. This information is not intended as a substitute for professional medical care. Always follow your healthcare professional's instructions.

## 2019-12-17 NOTE — TELEPHONE ENCOUNTER
Pt recommended to have EGD, when would pt be able to hold anticoagulation medication? Please advise. Thanks

## 2019-12-17 NOTE — LETTER
December 18, 2019      Kellen Michaels, ETIENNE  [address] [facility] [address] [place] [address]           [facility]  [address] [place] [address]  Phone: [phone]          Patient: Domingo Vaca   MR Number: [id number]   YOB: 1950   Date of Visit: 12/17/2019       Dear Kellen Michaels:    Thank you for referring Domingo Vaca to me for evaluation. Attached you will find relevant portions of my assessment and plan of care.    If you have questions, please do not hesitate to call me. I look forward to following Domingo Vaca along with you.    Sincerely,    Keturah Torres, [facility]    Enclosure  CC:  No Recipients    If you would like to receive this communication electronically, please contact [email or web address] or [phone] to request more information on [facility] access.    For providers and/or their staff who would like to refer a patient to [facility], please contact us through our one-stop-shop provider referral line, [facility], at [phone].    If you feel you have received this communication in error or would no longer like to receive these types of communications, please e-mail [email or web address]

## 2019-12-17 NOTE — PROGRESS NOTES
Subjective:       Patient ID: Domingo Vaca is a 69 y.o. male Body mass index is 38.84 kg/m².    Chief Complaint: Gastroesophageal Reflux    This patient is new to me.  Referring Provider: Kellen Michaels for dysphagia.     Gastroesophageal Reflux   He complains of choking (occasional; denies needing heimlich maneuever or having to seek medical assistance to relieve symptom), coughing (started ~4 weeks ago with dysphagia, occurs when he eats only; he coughs food up) and dysphagia (started 4 weeks ago, occurs with food & liquids; denies problems with pills). He reports no abdominal pain, no belching, no chest pain, no early satiety, no globus sensation, no heartburn (denies any heartburn or reflux recently), no hoarse voice, no nausea or no sore throat. Pertinent negatives include no fatigue, melena or weight loss. He has tried a PPI (protonix 20 mg bid) for the symptoms. Past procedures include an EGD.     Review of Systems   Constitutional: Negative for appetite change, chills, fatigue, fever and weight loss.        Patient reports he has a spinal cord disease called adhesive arachnoiditis- seeing Dr. Jean and he uses wheelchair for transportation or walker for ambulation   HENT: Positive for trouble swallowing. Negative for hoarse voice and sore throat.    Respiratory: Positive for cough (started ~4 weeks ago with dysphagia, occurs when he eats only; he coughs food up) and choking (occasional; denies needing heimlich maneuever or having to seek medical assistance to relieve symptom). Negative for shortness of breath.    Cardiovascular: Negative for chest pain.   Gastrointestinal: Positive for dysphagia (started 4 weeks ago, occurs with food & liquids; denies problems with pills). Negative for abdominal pain, anal bleeding, blood in stool, constipation, diarrhea, heartburn (denies any heartburn or reflux recently), melena, nausea, rectal pain and vomiting.   Genitourinary: Negative for difficulty  urinating, dysuria and flank pain.   Neurological: Negative for weakness.       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    Anticoagulant long-term use     Arthritis     Cervical stenosis of spine     Chronic cervical pain     Chronic lumbar pain     Claudication of both lower extremities     Coronary artery disease     1 stent     History of right common carotid artery stent placement     HTN (hypertension)     Hyperlipidemia     Lumbar facet arthropathy 02/2018    Lumbar stenosis with neurogenic claudication 02/2018    Obesity     PVD (peripheral vascular disease)     stents 1 left; 2 right    RSD lower limb     Left ankle    S/P insertion of spinal cord stimulator     not working    S/P lumbar spinal fusion     multiple     Past Surgical History:   Procedure Laterality Date    ABDOMINAL AORTOGRAPHY N/A 9/30/2019    Procedure: Aortogram, Abdominal;  Surgeon: Kael Woo MD;  Location: [facility] CATH;  Service: Cardiology;  Laterality: N/A;    ANGIOGRAM, CORONARY, WITH LEFT HEART CATHETERIZATION  11/5/2019    Procedure: Angiogram, Coronary, with Left Heart Cath;  Surgeon: Rivera Soto MD;  Location: [facility] CATH LAB;  Service: Cardiology;;    ANGIOPLASTY      Aortagram with Runoff; stent leg    ANKLE SURGERY Left     Multiple surgeries; now fused    APPENDECTOMY      BACK SURGERY      7 total    BRONCHOSCOPY Right 3/19/2019    Procedure: Bronchoscopy;  Surgeon: Antony Coelho Jr., DO;  Location: [facility] ENDO;  Service: Pulmonary;  Laterality: Right;    CAROTID STENT Left     CARPAL TUNNEL RELEASE Bilateral     left twice; right once    CERVICAL FUSION      over 5 levels    COLONOSCOPY  ~2015    normal findings per patient report    CORONARY ANGIOGRAPHY N/A 9/27/2019    Procedure: ANGIOGRAM, CORONARY ARTERY;  Surgeon: Michael Ruiz MD;  Location: [facility] CATH;  Service: Cardiology;   Laterality: N/A;    CORONARY STENT PLACEMENT N/A 11/5/2019    Procedure: INSERTION, STENT, CORONARY ARTERY;  Surgeon: Rivera Soto MD;  Location: [facility] CATH LAB;  Service: Cardiology;  Laterality: N/A;    ESOPHAGOGASTRODUODENOSCOPY N/A 3/16/2019    Procedure: EGD (ESOPHAGOGASTRODUODENOSCOPY)-in icu;  Surgeon: Angel Mckeon MD;  Location: [facility] ENDO;  Service: Endoscopy;  Laterality: N/A;  A large amount of food (residue) in the stomach, esophageal ulcer    ESOPHAGOGASTRODUODENOSCOPY Left 3/18/2019    Procedure: EGD (ESOPHAGOGASTRODUODENOSCOPY)- in ICU on drip;  Surgeon: Angel Mckeon MD;  Location: [facility] ENDO;  Service: Endoscopy;  Laterality: Left; esophageal ulcer, Erythematous mucosa was found in the gastric body, consistent with NG tube trauma    LEFT HEART CATHETERIZATION Left 9/27/2019    Procedure: Left heart cath;  Surgeon: Michael Ruiz MD;  Location: [facility] CATH;  Service: Cardiology;  Laterality: Left;    LEFT HEART CATHETERIZATION Right 9/30/2019    Procedure: Left heart cath;  Surgeon: Kael Woo MD;  Location: [facility] CATH;  Service: Cardiology;  Laterality: Right;    pain injection      Multiple    PERCUTANEOUS TRANSLUMINAL BALLOON ANGIOPLASTY OF CORONARY ARTERY  9/30/2019    Procedure: Angioplasty-coronary;  Surgeon: Kael Woo MD;  Location: [facility] CATH;  Service: Cardiology;;    POSTERIOR FUSION LUMBAR SPINE W/ CORPECTOMY      twice in last 18 mos (3/2016)    SPINAL CORD STIMULATOR IMPLANT  implanted 12/8/15    having great pain relief    SYMPATHECTOMY       Family History   Problem Relation Age of Onset    No Known Problems Mother     Heart disease Father     Early death Father 56        MI    Glaucoma Neg Hx     Colon cancer Neg Hx     Colon polyps Neg Hx     Crohn's disease Neg Hx     Esophageal cancer Neg Hx     Stomach cancer Neg Hx     Ulcerative colitis Neg Hx      Wt Readings from Last 10 Encounters:   12/17/19 112.5 kg (248 lb)    12/13/19 112.5 kg (248 lb 0.3 oz)   11/05/19 112.5 kg (248 lb)   10/30/19 113.4 kg (250 lb)   10/25/19 112.5 kg (248 lb)   10/21/19 114.7 kg (252 lb 13.9 oz)   10/17/19 122.4 kg (269 lb 13.5 oz)   10/07/19 117.9 kg (260 lb)   10/01/19 119.4 kg (263 lb 3.7 oz)   09/24/19 122 kg (268 lb 15.4 oz)     Lab Results   Component Value Date    WBC 6.25 11/05/2019    HGB 11.5 (L) 11/05/2019    HCT 35.5 (L) 11/05/2019    MCV 81 (L) 11/05/2019     11/05/2019     Lab Results   Component Value Date    IRON 111 03/16/2019    TIBC 321 03/16/2019    FERRITIN 55 03/16/2019     CMP  Sodium   Date Value Ref Range Status   11/05/2019 122 (L) 136 - 145 mmol/L Final     Potassium   Date Value Ref Range Status   11/05/2019 4.7 3.5 - 5.1 mmol/L Final     Chloride   Date Value Ref Range Status   11/05/2019 88 (L) 95 - 110 mmol/L Final     CO2   Date Value Ref Range Status   11/05/2019 24 23 - 29 mmol/L Final     Glucose   Date Value Ref Range Status   11/05/2019 105 70 - 110 mg/dL Final     BUN, Bld   Date Value Ref Range Status   11/05/2019 10 8 - 23 mg/dL Final     Creatinine   Date Value Ref Range Status   11/05/2019 0.9 0.5 - 1.4 mg/dL Final     Calcium   Date Value Ref Range Status   11/05/2019 8.9 8.7 - 10.5 mg/dL Final     Total Protein   Date Value Ref Range Status   09/25/2019 7.7 6.0 - 8.4 g/dL Final     Albumin   Date Value Ref Range Status   09/25/2019 4.2 3.5 - 5.2 g/dL Final     Total Bilirubin   Date Value Ref Range Status   09/25/2019 0.7 0.2 - 1.3 mg/dL Final     Alkaline Phosphatase   Date Value Ref Range Status   09/25/2019 66 38 - 145 U/L Final     AST   Date Value Ref Range Status   09/25/2019 69 (H) 17 - 59 U/L Final     ALT   Date Value Ref Range Status   09/25/2019 26 10 - 44 U/L Final     Anion Gap   Date Value Ref Range Status   11/05/2019 10 8 - 16 mmol/L Final     eGFR if    Date Value Ref Range Status   11/05/2019 >60.0 >60 mL/min/1.73 m^2 Final     eGFR if non    Date  Value Ref Range Status   11/05/2019 >60.0 >60 mL/min/1.73 m^2 Final     Comment:     Calculation used to obtain the estimated glomerular filtration  rate (eGFR) is the CKD-EPI equation.        Lab Results   Component Value Date    HEPCAB Negative 08/13/2014     Lab Results   Component Value Date    LIPASE 113 10/13/2016     Lab Results   Component Value Date    TSH 0.497 09/25/2019     Reviewed prior medical records including radiology report of 3/1/19 ct abdomen pelvis; & endoscopy history (see surgical history).    Objective:      Physical Exam   Constitutional: He is oriented to person, place, and time. He appears well-developed and well-nourished. No distress.   HENT:   Mouth/Throat: Oropharynx is clear and moist and mucous membranes are normal. No oral lesions. No oropharyngeal exudate.   Eyes: Pupils are equal, round, and reactive to light. Conjunctivae are normal. No scleral icterus.   Pulmonary/Chest: Effort normal and breath sounds normal. No respiratory distress. He has no wheezes.   Abdominal: Soft. Normal appearance and bowel sounds are normal. He exhibits no distension, no abdominal bruit and no mass. There is no tenderness. There is no rigidity, no rebound, no guarding, no tenderness at McBurney's point and negative Mclean's sign.   Neurological: He is alert and oriented to person, place, and time.   Skin: Skin is warm and dry. No rash noted. He is not diaphoretic. No erythema. No pallor.   Non-jaundiced   Psychiatric: He has a normal mood and affect. His behavior is normal. Judgment and thought content normal.   Nursing note and vitals reviewed.      Assessment:       1. Pharyngoesophageal dysphagia    2. Cough    3. History of esophageal ulcer    4. Anticoagulant long-term use    5. History of coronary artery stent placement    6. History of anemia    7. Elevated AST (SGOT)        Plan:       Pharyngoesophageal dysphagia  -     FL Upper GI Without [employer] Esophagram; Future; Expected date:  12/17/2019  -     Fl Modified Barium Swallow Speech; Future; Expected date: 12/17/2019  - schedule EGD ONCE CLEARED BY CARDIOLOGIST, discussed procedure with patient, including risks and benefits, patient verbalized understanding; discussed with patient that due to recent stent placements that his cardiologist is not likely going to allow him to hold the anticoagulant at this time and possible for several months. Patient verbalized understanding and requested that we ask his cardiologist, Dr. Schofield, when he would be able to have an outpatient EGD.  - educated patient to eat smaller more frequent meals and to eat slowly and advised to eat a soft diet.  - possible esophageal manometry if symptoms persist    Cough  -     FL Upper GI Without [employer] Esophagram; Future; Expected date: 12/17/2019  -     Fl Modified Barium Swallow Speech; Future; Expected date: 12/17/2019  Recommend follow-up with Primary Care Provider for continued evaluation and management.    History of esophageal ulcer  -     FL Upper GI Without [employer] Esophagram; Future; Expected date: 12/17/2019  -     Fl Modified Barium Swallow Speech; Future; Expected date: 12/17/2019  - CONTINUE PROTONIX 20 MG BID AS DIRECTED  - schedule EGD ONCE CLEARED BY CARDIOLOGIST, discussed procedure with patient, including risks and benefits, patient verbalized understanding; discussed with patient that due to recent stent placements that his cardiologist is not likely going to allow him to hold the anticoagulant at this time and possible for several months. Patient verbalized understanding and requested that we ask his cardiologist, Dr. Schofield, when he would be able to have an outpatient EGD.  - avoid/minimize use of NSAIDs- since they can cause GI upset, bleeding and/or ulcers. If NSAID must be taken, recommend take with food.    Anticoagulant long-term use & History of coronary artery stent placement  - schedule EGD ONCE CLEARED BY CARDIOLOGIST, discussed procedure with  patient, including risks and benefits, patient verbalized understanding; discussed with patient that due to recent stent placements that his cardiologist is not likely going to allow him to hold the anticoagulant at this time and possible for several months. Patient verbalized understanding and requested that we ask his cardiologist, Dr. Schofield, when he would be able to have an outpatient EGD.    History of anemia  Recommend follow-up with Primary Care Provider/hematology for continued evaluation and management.    Elevated AST (SGOT)  -     Hepatic function panel; Future; Expected date: 01/17/2020  - minimize/avoid alcohol and tylenol products, & follow-up with PCP for continued evaluation and management; if specialist is needed, recommend seeing hepatology.    Follow up in about 1 month (around 1/17/2020), or if symptoms worsen or fail to improve.      If no improvement in symptoms or symptoms worsen, call/follow-up at clinic or go to ER.

## 2019-12-18 ENCOUNTER — TELEPHONE (OUTPATIENT)
Dept: GASTROENTEROLOGY | Facility: CLINIC | Age: 69
End: 2019-12-18

## 2019-12-18 NOTE — TELEPHONE ENCOUNTER
----- Message from YARIEL Allen sent at 12/18/2019  8:19 AM CST -----  Please call to inform & review the results with the patient- Lab results are normal.  Continue with previous recommendations.  Thanks,  Keturah SALDIVAR-BELINDA

## 2019-12-18 NOTE — TELEPHONE ENCOUNTER
I had told the patient during our visit that Dr. Schofield would not likely allow him to hold the anticoagulant at this time since recently had stents placed. Patient wanted to know at what time would he be able to hold the anticoagulant so he could schedule an EGD.  Thanks  ALLEY

## 2019-12-24 ENCOUNTER — HOSPITAL ENCOUNTER (OUTPATIENT)
Dept: RADIOLOGY | Facility: HOSPITAL | Age: 69
Discharge: HOME | End: 2019-12-24
Attending: NURSE PRACTITIONER
Payer: MEDICARE

## 2019-12-24 DIAGNOSIS — R05.9 COUGH: ICD-10-CM

## 2019-12-24 DIAGNOSIS — Z87.19 HISTORY OF ESOPHAGEAL ULCER: ICD-10-CM

## 2019-12-24 DIAGNOSIS — R13.14 PHARYNGOESOPHAGEAL DYSPHAGIA: ICD-10-CM

## 2019-12-24 PROCEDURE — 74240 X-RAY XM UPR GI TRC 1CNTRST: CPT | Mod: TC,FY,PO

## 2019-12-24 PROCEDURE — 74240 FL UPPER GI W/OUT KUB TO INCLUDE ESOPHAGRAM: ICD-10-PCS | Mod: 26,,, | Performed by: RADIOLOGY

## 2019-12-24 PROCEDURE — A9698 NON-RAD CONTRAST MATERIALNOC: HCPCS | Mod: PO | Performed by: NURSE PRACTITIONER

## 2019-12-24 PROCEDURE — 74240 X-RAY XM UPR GI TRC 1CNTRST: CPT | Mod: 26,,, | Performed by: RADIOLOGY

## 2019-12-24 PROCEDURE — 25500020 PHARM REV CODE 255: Mod: PO | Performed by: NURSE PRACTITIONER

## 2019-12-24 RX ADMIN — BARIUM SULFATE 135 ML: 980 POWDER, FOR SUSPENSION ORAL at 09:12

## 2019-12-26 DIAGNOSIS — I73.9 PAD (PERIPHERAL ARTERY DISEASE): ICD-10-CM

## 2019-12-26 DIAGNOSIS — E78.2 MIXED HYPERLIPIDEMIA: ICD-10-CM

## 2019-12-26 DIAGNOSIS — I10 ESSENTIAL HYPERTENSION: ICD-10-CM

## 2019-12-27 ENCOUNTER — TELEPHONE (OUTPATIENT)
Dept: GASTROENTEROLOGY | Facility: CLINIC | Age: 69
End: 2019-12-27

## 2019-12-27 DIAGNOSIS — R05.9 COUGH: ICD-10-CM

## 2019-12-27 DIAGNOSIS — R93.3 ABNORMAL UGI SERIES: Primary | ICD-10-CM

## 2019-12-27 RX ORDER — SIMVASTATIN 40 MG/1
TABLET, FILM COATED ORAL
Qty: 90 TABLET | Refills: 3 | Status: SHIPPED | OUTPATIENT
Start: 2019-12-27 | End: 2020-01-13 | Stop reason: SDUPTHER

## 2019-12-27 RX ORDER — CLOPIDOGREL BISULFATE 75 MG/1
TABLET ORAL
Qty: 90 TABLET | Refills: 3 | Status: SHIPPED | OUTPATIENT
Start: 2019-12-27 | End: 2020-01-13 | Stop reason: SDUPTHER

## 2019-12-27 RX ORDER — OLMESARTAN MEDOXOMIL 40 MG/1
TABLET ORAL
Qty: 90 TABLET | Refills: 3 | Status: SHIPPED | OUTPATIENT
Start: 2019-12-27 | End: 2020-01-13 | Stop reason: SDUPTHER

## 2019-12-27 NOTE — TELEPHONE ENCOUNTER
"Please call to inform & review the results with the patient- radiology report of the UGI with esophagram showed "There is a large diverticulum projecting medially from the 2nd portion of the duodenum." This is typically a benign finding and Recommend high fiber diet (20-30 grams of fiber daily)/OTC fiber supplements daily as directed.    "There was some pooling of barium in the vallecula (back of throat) but without obvious penetration or aspiration." Recommend follow-up with ENT for continued evaluation and management of this finding.  No evidence of ulcer. Other findings are unremarkable.    The modified barium swallow study showed no aspiration. The speech therapist note is not complete so was not able to view his/her recommendations.    Continue with previous recommendations. If no improvement in symptoms or symptoms worsen, call/follow-up at clinic or go to ER.  Please release results to patient's mychart once you have discussed results and recommendations with patient.  Thanks,        "

## 2020-01-10 ENCOUNTER — OFFICE VISIT (OUTPATIENT)
Dept: FAMILY MEDICINE | Facility: CLINIC | Age: 70
End: 2020-01-10
Payer: MEDICARE

## 2020-01-10 ENCOUNTER — PATIENT MESSAGE (OUTPATIENT)
Dept: FAMILY MEDICINE | Facility: CLINIC | Age: 70
End: 2020-01-10

## 2020-01-10 ENCOUNTER — INITIAL CONSULT (OUTPATIENT)
Dept: DERMATOLOGY | Facility: CLINIC | Age: 70
End: 2020-01-10
Payer: MEDICARE

## 2020-01-10 ENCOUNTER — HOSPITAL ENCOUNTER (OUTPATIENT)
Dept: RADIOLOGY | Facility: HOSPITAL | Age: 70
Discharge: HOME | End: 2020-01-10
Attending: NURSE PRACTITIONER
Payer: MEDICARE

## 2020-01-10 VITALS
WEIGHT: 248 LBS | TEMPERATURE: 98 F | HEART RATE: 67 BPM | BODY MASS INDEX: 38.92 KG/M2 | DIASTOLIC BLOOD PRESSURE: 68 MMHG | HEIGHT: 67 IN | SYSTOLIC BLOOD PRESSURE: 136 MMHG | OXYGEN SATURATION: 98 %

## 2020-01-10 VITALS — WEIGHT: 248 LBS | RESPIRATION RATE: 18 BRPM | BODY MASS INDEX: 38.92 KG/M2 | HEIGHT: 67 IN

## 2020-01-10 DIAGNOSIS — R05.3 PERSISTENT COUGH: ICD-10-CM

## 2020-01-10 DIAGNOSIS — L29.9 PRURITUS: ICD-10-CM

## 2020-01-10 DIAGNOSIS — L98.9 DISEASE OF SKIN AND SUBCUTANEOUS TISSUE: Primary | ICD-10-CM

## 2020-01-10 DIAGNOSIS — E11.42 TYPE 2 DIABETES MELLITUS WITH DIABETIC POLYNEUROPATHY, WITHOUT LONG-TERM CURRENT USE OF INSULIN: ICD-10-CM

## 2020-01-10 DIAGNOSIS — E66.01 MORBID OBESITY: ICD-10-CM

## 2020-01-10 DIAGNOSIS — L30.9 DERMATITIS: Primary | ICD-10-CM

## 2020-01-10 PROCEDURE — 99999 PR PBB SHADOW E&M-EST. PATIENT-LVL IV: CPT | Mod: PBBFAC,,, | Performed by: NURSE PRACTITIONER

## 2020-01-10 PROCEDURE — 1159F MED LIST DOCD IN RCRD: CPT | Mod: S$GLB,,, | Performed by: DERMATOLOGY

## 2020-01-10 PROCEDURE — 99999 PR PBB SHADOW E&M-EST. PATIENT-LVL III: CPT | Mod: PBBFAC,,, | Performed by: DERMATOLOGY

## 2020-01-10 PROCEDURE — 1159F PR MEDICATION LIST DOCUMENTED IN MEDICAL RECORD: ICD-10-PCS | Mod: S$GLB,,, | Performed by: DERMATOLOGY

## 2020-01-10 PROCEDURE — 99214 OFFICE O/P EST MOD 30 MIN: CPT | Mod: S$GLB,,, | Performed by: NURSE PRACTITIONER

## 2020-01-10 PROCEDURE — 1101F PT FALLS ASSESS-DOCD LE1/YR: CPT | Mod: CPTII,S$GLB,, | Performed by: NURSE PRACTITIONER

## 2020-01-10 PROCEDURE — 1101F PR PT FALLS ASSESS DOC 0-1 FALLS W/OUT INJ PAST YR: ICD-10-PCS | Mod: CPTII,S$GLB,, | Performed by: DERMATOLOGY

## 2020-01-10 PROCEDURE — 99202 PR OFFICE/OUTPT VISIT, NEW, LEVL II, 15-29 MIN: ICD-10-PCS | Mod: 25,S$GLB,, | Performed by: DERMATOLOGY

## 2020-01-10 PROCEDURE — 3074F SYST BP LT 130 MM HG: CPT | Mod: CPTII,S$GLB,, | Performed by: DERMATOLOGY

## 2020-01-10 PROCEDURE — 99999 PR PBB SHADOW E&M-EST. PATIENT-LVL IV: ICD-10-PCS | Mod: PBBFAC,,, | Performed by: NURSE PRACTITIONER

## 2020-01-10 PROCEDURE — 1101F PT FALLS ASSESS-DOCD LE1/YR: CPT | Mod: CPTII,S$GLB,, | Performed by: DERMATOLOGY

## 2020-01-10 PROCEDURE — 71046 XR CHEST PA AND LATERAL: ICD-10-PCS | Mod: 26,,, | Performed by: RADIOLOGY

## 2020-01-10 PROCEDURE — 71046 X-RAY EXAM CHEST 2 VIEWS: CPT | Mod: TC,FY,PO

## 2020-01-10 PROCEDURE — 3074F PR MOST RECENT SYSTOLIC BLOOD PRESSURE < 130 MM HG: ICD-10-PCS | Mod: CPTII,S$GLB,, | Performed by: DERMATOLOGY

## 2020-01-10 PROCEDURE — 99214 PR OFFICE/OUTPT VISIT, EST, LEVL IV, 30-39 MIN: ICD-10-PCS | Mod: S$GLB,,, | Performed by: NURSE PRACTITIONER

## 2020-01-10 PROCEDURE — 88305 TISSUE EXAM BY PATHOLOGIST: ICD-10-PCS | Mod: 26,,, | Performed by: PATHOLOGY

## 2020-01-10 PROCEDURE — 3075F PR MOST RECENT SYSTOLIC BLOOD PRESS GE 130-139MM HG: ICD-10-PCS | Mod: CPTII,S$GLB,, | Performed by: NURSE PRACTITIONER

## 2020-01-10 PROCEDURE — 1101F PR PT FALLS ASSESS DOC 0-1 FALLS W/OUT INJ PAST YR: ICD-10-PCS | Mod: CPTII,S$GLB,, | Performed by: NURSE PRACTITIONER

## 2020-01-10 PROCEDURE — 1125F PR PAIN SEVERITY QUANTIFIED, PAIN PRESENT: ICD-10-PCS | Mod: S$GLB,,, | Performed by: NURSE PRACTITIONER

## 2020-01-10 PROCEDURE — 3078F PR MOST RECENT DIASTOLIC BLOOD PRESSURE < 80 MM HG: ICD-10-PCS | Mod: CPTII,S$GLB,, | Performed by: DERMATOLOGY

## 2020-01-10 PROCEDURE — 88312 SPECIAL STAINS GROUP 1: CPT | Mod: 26,,, | Performed by: PATHOLOGY

## 2020-01-10 PROCEDURE — 88312 SPECIAL STAINS GROUP 1: CPT | Performed by: PATHOLOGY

## 2020-01-10 PROCEDURE — 71046 X-RAY EXAM CHEST 2 VIEWS: CPT | Mod: 26,,, | Performed by: RADIOLOGY

## 2020-01-10 PROCEDURE — 1159F PR MEDICATION LIST DOCUMENTED IN MEDICAL RECORD: ICD-10-PCS | Mod: S$GLB,,, | Performed by: NURSE PRACTITIONER

## 2020-01-10 PROCEDURE — 99202 OFFICE O/P NEW SF 15 MIN: CPT | Mod: 25,S$GLB,, | Performed by: DERMATOLOGY

## 2020-01-10 PROCEDURE — 3078F DIAST BP <80 MM HG: CPT | Mod: CPTII,S$GLB,, | Performed by: NURSE PRACTITIONER

## 2020-01-10 PROCEDURE — 3075F SYST BP GE 130 - 139MM HG: CPT | Mod: CPTII,S$GLB,, | Performed by: NURSE PRACTITIONER

## 2020-01-10 PROCEDURE — 99999 PR PBB SHADOW E&M-EST. PATIENT-LVL III: ICD-10-PCS | Mod: PBBFAC,,, | Performed by: DERMATOLOGY

## 2020-01-10 PROCEDURE — 88312 PR  SPECIAL STAINS,GROUP I: ICD-10-PCS | Mod: 26,,, | Performed by: PATHOLOGY

## 2020-01-10 PROCEDURE — 99499 UNLISTED E&M SERVICE: CPT | Mod: S$GLB,,, | Performed by: NURSE PRACTITIONER

## 2020-01-10 PROCEDURE — 1159F MED LIST DOCD IN RCRD: CPT | Mod: S$GLB,,, | Performed by: NURSE PRACTITIONER

## 2020-01-10 PROCEDURE — 3078F DIAST BP <80 MM HG: CPT | Mod: CPTII,S$GLB,, | Performed by: DERMATOLOGY

## 2020-01-10 PROCEDURE — 3078F PR MOST RECENT DIASTOLIC BLOOD PRESSURE < 80 MM HG: ICD-10-PCS | Mod: CPTII,S$GLB,, | Performed by: NURSE PRACTITIONER

## 2020-01-10 PROCEDURE — 99499 RISK ADDL DX/OHS AUDIT: ICD-10-PCS | Mod: S$GLB,,, | Performed by: NURSE PRACTITIONER

## 2020-01-10 PROCEDURE — 88305 TISSUE EXAM BY PATHOLOGIST: CPT | Mod: 26,,, | Performed by: PATHOLOGY

## 2020-01-10 PROCEDURE — 1125F AMNT PAIN NOTED PAIN PRSNT: CPT | Mod: S$GLB,,, | Performed by: NURSE PRACTITIONER

## 2020-01-10 PROCEDURE — 88305 TISSUE EXAM BY PATHOLOGIST: CPT | Mod: 59 | Performed by: PATHOLOGY

## 2020-01-10 RX ORDER — PREDNISONE 10 MG/1
TABLET ORAL
Qty: 20 TABLET | Refills: 0 | Status: SHIPPED | OUTPATIENT
Start: 2020-01-10 | End: 2020-01-31

## 2020-01-10 RX ORDER — DOXYCYCLINE HYCLATE 100 MG
100 TABLET ORAL EVERY 12 HOURS
Qty: 14 TABLET | Refills: 0 | Status: SHIPPED | OUTPATIENT
Start: 2020-01-10 | End: 2020-01-17

## 2020-01-10 RX ORDER — TRIAMCINOLONE ACETONIDE 1 MG/G
OINTMENT TOPICAL 2 TIMES DAILY
Qty: 453.6 G | Refills: 1 | Status: SHIPPED | OUTPATIENT
Start: 2020-01-10 | End: 2020-05-01

## 2020-01-10 NOTE — PROGRESS NOTES
Subjective:       Patient ID: Domingo Vaca is a 69 y.o. male.    Chief Complaint: Rash and face peeling    HPI   Mr. Vaca is a known patient to me. He presents today for itchy rash to forearms, face, chest, and thighs. Gradually worsening over the past 2 weeks--initially started on arms. Denies new meds, food, supplements, contact irritants. Reports blistering with clear drainage to forearms  Vitals:    01/10/20 0927   BP: 136/68   Pulse: 67   Temp: 98.2 °F (36.8 °C)     Review of Systems   Constitutional: Negative for diaphoresis and fever.   HENT: Negative for facial swelling and trouble swallowing.    Eyes: Negative for discharge and redness.   Respiratory: Positive for cough. Negative for shortness of breath.    Cardiovascular: Negative for chest pain and palpitations.   Gastrointestinal: Negative for diarrhea.   Genitourinary: Negative for difficulty urinating.   Musculoskeletal: Negative for gait problem.   Skin: Positive for rash. Negative for pallor.   Neurological: Negative for facial asymmetry and speech difficulty.   Psychiatric/Behavioral: Negative for confusion. The patient is not nervous/anxious.        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    Anticoagulant long-term use     Arthritis     Cervical stenosis of spine     Chronic cervical pain     Chronic lumbar pain     Claudication of both lower extremities     Coronary artery disease     1 stent     Disorder of kidney and ureter     History of right common carotid artery stent placement     HTN (hypertension)     Hyperlipidemia     Lumbar facet arthropathy 02/2018    Lumbar stenosis with neurogenic claudication 02/2018    Obesity     PVD (peripheral vascular disease)     stents 1 left; 2 right    RSD lower limb     Left ankle    S/P insertion of spinal cord stimulator     not working    S/P lumbar spinal fusion     multiple      Objective:      Physical Exam   Constitutional: He is oriented to person, place, and time. He does not have a sickly appearance. No distress.   HENT:   Head: Normocephalic.   Right Ear: Hearing normal.   Left Ear: Hearing normal.   Nose: Nose normal.   Eyes: Conjunctivae and lids are normal.   Neck: No JVD present. No tracheal deviation present.   Cardiovascular: Normal rate and normal heart sounds.   Pulmonary/Chest: Effort normal and breath sounds normal.   Abdominal: He exhibits no distension.   Musculoskeletal: He exhibits no deformity.   Neurological: He is alert and oriented to person, place, and time.   Skin: He is not diaphoretic. No pallor.   See attached pictures    Psychiatric: He has a normal mood and affect. His speech is normal and behavior is normal. Judgment and thought content normal. Cognition and memory are normal.   Nursing note and vitals reviewed.              Assessment:       1. Dermatitis    2. Persistent cough    3. Morbid obesity    4. Type 2 diabetes mellitus with diabetic polyneuropathy, without long-term current use of insulin        Plan:       Dermatitis  -     predniSONE (DELTASONE) 10 MG tablet; Take 4 tabs daily for 2 days, then 3 tabs daily for 2 days, then 2 tabs daily for 2 days, then 1 tab daily for 2 days, then stop (Patient not taking: Reported on 1/10/2020)  Dispense: 20 tablet; Refill: 0  -     doxycycline (VIBRA-TABS) 100 MG tablet; Take 1 tablet (100 mg total) by mouth every 12 (twelve) hours. for 7 days (Patient not taking: Reported on 1/10/2020)  Dispense: 14 tablet; Refill: 0    Persistent cough  -     X-Ray Chest PA And Lateral; Future; Expected date: 01/10/2020    Morbid obesity    Type 2 diabetes mellitus with diabetic polyneuropathy, without long-term current use of insulin    FU 1 week with dermatology, sooner if symptoms rapidly worsening or new symptoms arise           Medication List with Changes/Refills   New Medications    DOXYCYCLINE (VIBRA-TABS) 100 MG  TABLET    Take 1 tablet (100 mg total) by mouth every 12 (twelve) hours. for 7 days    PREDNISONE (DELTASONE) 10 MG TABLET    Take 4 tabs daily for 2 days, then 3 tabs daily for 2 days, then 2 tabs daily for 2 days, then 1 tab daily for 2 days, then stop    TRIAMCINOLONE ACETONIDE 0.1% (KENALOG) 0.1 % OINTMENT    Apply topically 2 (two) times daily.   Current Medications    ACETAMINOPHEN (TYLENOL) 500 MG TABLET    Take 1,000 mg by mouth every 6 (six) hours as needed for Pain.    AMLODIPINE BESYLATE (NORVASC ORAL)    Take 5 mg by mouth every evening.     ASPIRIN (ECOTRIN) 81 MG EC TABLET    Take 1 tablet (81 mg total) by mouth once daily. for 14 days    CLOPIDOGREL (PLAVIX) 75 MG TABLET    TAKE 1 TABLET ONCE DAILY    DULOXETINE (CYMBALTA) 30 MG CAPSULE    Take 1 capsule by mouth once daily.     METFORMIN (GLUCOPHAGE-XR) 500 MG 24 HR TABLET    Take 1 tablet (500 mg total) by mouth daily with breakfast.    METOPROLOL TARTRATE (LOPRESSOR) 25 MG TABLET    TAKE 1 TABLET BY MOUTH TWICE DAILY FOR 14 DAYS    OLMESARTAN (BENICAR) 40 MG TABLET    TAKE 1 TABLET ONCE DAILY    OXCARBAZEPINE (TRILEPTAL) 600 MG TAB    Take 1 tablet (600 mg total) by mouth 2 (two) times daily.    PANTOPRAZOLE (PROTONIX) 20 MG TABLET    Take 1 tablet (20 mg total) by mouth 2 (two) times daily.    PREGABALIN (LYRICA) 200 MG CAP    Take 1 capsule (200 mg total) by mouth 3 (three) times daily.    SIMVASTATIN (ZOCOR) 40 MG TABLET    TAKE 1 TABLET EVERY EVENING    TAMSULOSIN (FLOMAX) 0.4 MG CAP    TAKE 1 CAPSULE BY MOUTH EVERY DAY FOR 14 DAYS

## 2020-01-10 NOTE — PROGRESS NOTES
Subjective:       Patient ID:  Domingo Vaca is a 69 y.o. male who presents for   Chief Complaint   Patient presents with    Rash     Patient present for initial visit, rash, face skin peeling.    C/o facial skin peeling x 2 weeks. Pt states face has red blotches. Pt states pillow cases and sheets have dead skin on them. Pt states when he wipes with cool water it goes away but it comes back.     C/o rash to neck, chest, arms, legs x 2 weeks, pt states pain is a 0/10, rash per pt appears to be red, raised, itchy, weeping (clear) and spreading (since about 2 days ago, pt denies any new exposures or change in medications, soaps, detergents, or topical applications. pt denies causes to flare rash.    Flomax, metoprolol stared in November 2019  Increased dosage in trileptal w/ blurred vision and cough     Treated with:  pt states loratadine (1 time daily) otc treatment not helping.    Pt uses:  Soap: Dial gold  Detergent: extra + oxy clean, dryer sheets: snuggle , Fabric softer: n/a.  Lotions: n/a    Pt hx:  no Allergies  no Asthma  no Eczema  no psoriasis    Family Hx:  no Allergies  no Asthma  no Eczema  no psoriasis    Skin Cancer assessment:  no Phx of NMSC.  no Fhx of melanoma.    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  No date: Anticoagulant long-term use  No date: Arthritis  No date: Cervical stenosis of spine  No date: Chronic cervical pain  No date: Chronic lumbar pain  No date: Claudication of both lower extremities  No date: Coronary artery disease      Comment:  1 stent   No date: History of right common carotid artery stent placement  No date: HTN (hypertension)  No date: Hyperlipidemia  02/2018: Lumbar facet arthropathy  02/2018: Lumbar stenosis with neurogenic claudication  No date: Obesity  No date: PVD (peripheral vascular disease)      Comment:  stents 1 left; 2 right  No date: RSD lower limb      Comment:  Left ankle  No  date: S/P insertion of spinal cord stimulator      Comment:  not working  No date: S/P lumbar spinal fusion      Comment:  multiple      Review of Systems   Constitutional: Negative for fever, chills, fatigue and malaise.   Respiratory: Negative for cough.    Skin: Positive for itching and rash.   Hematologic/Lymphatic: Negative for adenopathy.   Allergic/Immunologic: Positive for environmental allergies.        Objective:    Physical Exam   Constitutional: He appears well-developed and well-nourished. He is in a wheelchair.  No distress.   Neurological: He is alert and oriented to person, place, and time. He is not disoriented.   Psychiatric: He has a normal mood and affect.   Skin:   Areas Examined (abnormalities noted in diagram):   Head / Face Inspection Performed  Neck Inspection Performed  Chest / Axilla Inspection Performed  Back Inspection Performed  RUE Inspected  LUE Inspection Performed  RLE Inspected  LLE Inspection Performed              Diagram Legend     Erythematous scaling macule/papule c/w actinic keratosis       Vascular papule c/w angioma      Pigmented verrucoid papule/plaque c/w seborrheic keratosis      Yellow umbilicated papule c/w sebaceous hyperplasia      Irregularly shaped tan macule c/w lentigo     1-2 mm smooth white papules consistent with Milia      Movable subcutaneous cyst with punctum c/w epidermal inclusion cyst      Subcutaneous movable cyst c/w pilar cyst      Firm pink to brown papule c/w dermatofibroma      Pedunculated fleshy papule(s) c/w skin tag(s)      Evenly pigmented macule c/w junctional nevus     Mildly variegated pigmented, slightly irregular-bordered macule c/w mildly atypical nevus      Flesh colored to evenly pigmented papule c/w intradermal nevus       Pink pearly papule/plaque c/w basal cell carcinoma      Erythematous hyperkeratotic cursted plaque c/w SCC      Surgical scar with no sign of skin cancer recurrence      Open and closed comedones      Inflammatory  papules and pustules      Verrucoid papule consistent consistent with wart     Erythematous eczematous patches and plaques     Dystrophic onycholytic nail with subungual debris c/w onychomycosis     Umbilicated papule    Erythematous-base heme-crusted tan verrucoid plaque consistent with inflamed seborrheic keratosis     Erythematous Silvery Scaling Plaque c/w Psoriasis     See annotation                      Assessment / Plan:      Pathology Orders:     Normal Orders This Visit    Specimen to Pathology, Dermatology     Questions:    Procedure Type:  Dermatology and skin neoplasms    Number of Specimens:  2    ------------------------:  -------------------------    Spec 1 Procedure:  Biopsy    Spec 1 Clinical Impression:  eczema (CAD) vs drug induced eczematous reaction vs DHR r/o CTCL    Spec 1 Source:  Left Chest    ------------------------:  -------------------------    Spec 2 Procedure:  Biopsy    Spec 2 Clinical Impression:  eczema (CAD) vs drug induced eczematous reaction vs DHR r/o CTCL    Spec 2 Source:  right upper arm        Disease of skin and subcutaneous tissue  -     Specimen to Pathology, Dermatology  -     triamcinolone acetonide 0.1% (KENALOG) 0.1 % ointment; Apply topically 2 (two) times daily.  Dispense: 453.6 g; Refill: 1    Punch biopsy procedure note x2:  Punch biopsy performed after verbal consent obtained. Area marked and prepped with alcohol. Approximately 1cc of 1% lidocaine with epinephrine injected. 4 mm disposable punch used to remove lesion. Hemostasis obtained and biopsy site closed with 1 - 2 Prolene sutures. Wound care instructions reviewed with patient and handout given.      Pruritus    Okay to take prednisone taper as prescribed by PCP. Discussed SE to include but not limited to elevated BP, elevated glucose, mood changes. Pt to monitor  - I prescribed TMC 0.1% to be applied twice daily to affected areas for two weeks and then tapered to weekends only.   -Side effects of topical  steroids were reviewed with the patient including skin atrophy, striae, telangectasias and tachyphylaxis.  - Recommend allegra once to twice daily for pruritus and benadryl at night.             Follow up if symptoms worsen or fail to improve.     ADDENDUM:   1.  Skin, left chest, punch biopsy:   -SUBACUTE SPONGIOTIC DERMATITIS WITH EOSINOPHILS, see comment     2.  Skin, right upper arm, punch biopsy:   -SUBACUTE SPONGIOTIC DERMATITIS WITH EOSINOPHILS, see comment     COMMENT:  The clinical images were reviewed on EPIC.  The presence of   scattered eosinophils suggests the possibility of a delayed hypersensitivity   reaction.  Classical histological features for CTCL are not appreciated in   this biopsy.  Clinical correlation is recommended.    Comment: Interpreted by: Juan Johnson M.D., Signed on 01/22/2020 at 14:14     Discussed results with pt. Pt states rash is 80-90% improved. Mild rash still present on arms. Recommend he continue TMC 0.1% ointment to area BID x 2 wks then off for 2 wks. Recommend benadryl at night - discussed SEs. Recommend he continue claritin for his pruritus. Pt voiced understanding.   If rash continues, we will revisit his medication history.   Pt states he started Flomax and metoprolol in November 2019. He also had an increase in dosage of trileptal in November but then he experienced blurred vision and cough and he return to his original dosage. He started trileptal in 7/2019. Denies any other medication changes.

## 2020-01-10 NOTE — LETTER
January 10, 2020      Kellen Michaels, ETIENNE  [address] [facility] [address] [place] [address]           [facility]  [address] [place] [address]  Phone: [phone]  Fax: [phone]          Patient: Domingo Vaca   MR Number: [id number]   YOB: 1950   Date of Visit: 1/10/2020       Dear Kellen Michaels:    Thank you for referring Domingo Vaca to me for evaluation. Attached you will find relevant portions of my assessment and plan of care.    If you have questions, please do not hesitate to call me. I look forward to following Domingo Vaca along with you.    Sincerely,    Kylie Vargas MD    Enclosure  CC:  No Recipients    If you would like to receive this communication electronically, please contact [email or web address] or [phone] to request more information on [employer] access.    For providers and/or their staff who would like to refer a patient to [facility], please contact us through our one-stop-shop provider referral line, [facility], at [phone].    If you feel you have received this communication in error or would no longer like to receive these types of communications, please e-mail [email or web address]

## 2020-01-11 ENCOUNTER — PATIENT MESSAGE (OUTPATIENT)
Dept: FAMILY MEDICINE | Facility: CLINIC | Age: 70
End: 2020-01-11

## 2020-01-11 DIAGNOSIS — G03.9 ARACHNOIDITIS: ICD-10-CM

## 2020-01-11 DIAGNOSIS — E78.2 MIXED HYPERLIPIDEMIA: ICD-10-CM

## 2020-01-11 DIAGNOSIS — E11.49 TYPE 2 DIABETES MELLITUS WITH OTHER NEUROLOGIC COMPLICATION, WITHOUT LONG-TERM CURRENT USE OF INSULIN: ICD-10-CM

## 2020-01-11 DIAGNOSIS — R13.10 DYSPHAGIA, UNSPECIFIED TYPE: ICD-10-CM

## 2020-01-11 DIAGNOSIS — R39.15 URINARY URGENCY: ICD-10-CM

## 2020-01-11 DIAGNOSIS — I73.9 PAD (PERIPHERAL ARTERY DISEASE): ICD-10-CM

## 2020-01-11 DIAGNOSIS — E11.42 DIABETIC POLYNEUROPATHY ASSOCIATED WITH TYPE 2 DIABETES MELLITUS: Primary | ICD-10-CM

## 2020-01-11 DIAGNOSIS — I10 ESSENTIAL HYPERTENSION: ICD-10-CM

## 2020-01-13 NOTE — TELEPHONE ENCOUNTER
Refill Authorization Note     is requesting a refill authorization.    Brief assessment and rationale for refill: REVIEW: Metoprolol/pantoprazole (gerd not on problem list)/tamsulosin-not previously prescribed by you; ROUTE: OXcarbazepine/Lyrica/Duloxetine (nerve pain)-OP; APPROVE: New mail order pharmacy          Medication Therapy Plan: pt needs meds sent to new mail order pharmacy; metoprolol/pantoprazole/tamsulosin-not previously prescribed by pcp; sandra not on problem list; route OXcarbazepine/Lyrica/Duloxetine (nerve pain)-OP; approve 12 more on amlodipine/clopidogrel/olmesartan/simvastatin; 3 more on metformin    Medication reconciliation completed: No   Pharmacist Review Requested: Yes                     Comments:   Requested Prescriptions   Pending Prescriptions Disp Refills    amLODIPine (NORVASC) 5 MG tablet 90 tablet 3     Sig: Take 1 tablet (5 mg total) by mouth every evening.       Cardiovascular:  Calcium Channel Blockers Passed - 1/13/2020  2:23 PM        Passed - Patient is at least 18 years old        Passed - Last BP in normal range within 360 days     BP Readings from Last 3 Encounters:   01/10/20 136/68   12/17/19 (!) 140/70   12/13/19 138/80              Passed - Office visit in past 12 months or future 90 days     Recent Outpatient Visits            3 days ago Dermatitis    [facility] Kellen Michaels NP    3 weeks ago Pharyngoesophageal dysphagia    [place] [facility] YARIEL Allen    1 month ago Dysphagia, unspecified type    [facility] Kellen Michaels NP    2 months ago Spinal cord stimulator status    [facility] Olga P Fermo, MD    2 months ago Arachnoiditis    [facility] Marty Iqbal MD          Future Appointments              In 1 week NURSE, [facility] DERMATOLOGY [place] - [facility]ology[facility]    In 2 weeks [place]ga P Fermo[place] [facility] [place]                clopidogrel (PLAVIX) 75 mg tablet 90 tablet 3     Sig: Take 1 tablet (75 mg total) by mouth once daily.       There is no refill protocol information for this order       DULoxetine (CYMBALTA) 30 MG capsule 90 capsule 3     Sig: Take 1 capsule (30 mg total) by mouth once daily.       Psychiatry: Antidepressants - SNRI Passed - 1/13/2020  2:23 PM        Passed - Patient is at least 18 years old        Passed - Last BP in normal range within 360 days     BP Readings from Last 3 Encounters:   01/10/20 136/68   12/17/19 (!) 140/70   12/13/19 138/80              Passed - Office visit in past 6 months or future 90 days     Recent Outpatient Visits            3 days ago Dermatitis    [facility] Kellen Michaels NP    3 weeks ago Pharyngoesophageal dysphagia    [facility]enter[facility] YARIEL Allen    1 month ago Dysphagia, unspecified type    [facility] Kellen Michaels NP    2 months ago Spinal cord stimulator status    [facility]    2 months ago Arachnoiditis    [facility] Marty Iqbal MD          Future Appointments              In 1 week NURSE, [facility] DERMATOLOGY [place] - DermatologyGabe    In 2 weeks Marilia Jean MD [facility]                Passed - Cr is 1.4 or below and within 360 days     Creatinine   Date Value Ref Range Status   11/05/2019 0.9 0.5 - 1.4 mg/dL Final   10/25/2019 1.04 0.50 - 1.40 mg/dL Final   10/01/2019 0.78 0.50 - 1.40 mg/dL Final     POC Creatinine   Date Value Ref Range Status   06/04/2018 0.9 0.5 - 1.4 mg/dL Final              Passed - eGFR within 360 days     eGFR if non    Date Value Ref Range Status   11/05/2019 >60.0 >60 mL/min/1.73 m^2 Final     Comment:     Calculation used to obtain the estimated glomerular filtration  rate (eGFR) is the CKD-EPI equation.      10/25/2019 >60 >60 mL/min/1.73 m^2 Final     Comment:     Calculation used  to obtain the estimated glomerular filtration  rate (eGFR) is the CKD-EPI equation.      10/01/2019 >60 >60 mL/min/1.73 m^2 Final     Comment:     Calculation used to obtain the estimated glomerular filtration  rate (eGFR) is the CKD-EPI equation.        eGFR if    Date Value Ref Range Status   11/05/2019 >60.0 >60 mL/min/1.73 m^2 Final   10/25/2019 >60 >60 mL/min/1.73 m^2 Final   10/01/2019 >60 >60 mL/min/1.73 m^2 Final             metFORMIN (GLUCOPHAGE-XR) 500 MG 24 hr tablet 90 tablet 1     Sig: Take 1 tablet (500 mg total) by mouth daily with breakfast.       Endocrinology:  Diabetes - Biguanides Passed - 1/13/2020  2:23 PM        Passed - Patient is at least 18 years old        Passed - Office visit in past 12 months or future 90 days     Recent Outpatient Visits            3 days ago Dermatitis    [facility] Kellen Michaels NP    3 weeks ago Pharyngoesophageal dysphagia    [facility] Gastroenterology YARIEL Allen    1 month ago Dysphagia, unspecified type    [facility] Kellen Michaels NP    2 months ago Spinal cord stimulator status    [facility] Olga P Fermo, MD    2 months ago Arachnoiditis    [facility] Marty Iqbal MD          Future Appointments              In 1 week NURSE, [facility] DERMATOLOGY [place]  DermatologyGabe    In 2 weeks Olga P Fermo, [place] [facility] [place]                Passed - Cr is 1.4 or below and within 360 days     Creatinine   Date Value Ref Range Status   11/05/2019 0.9 0.5 - 1.4 mg/dL Final   10/25/2019 1.04 0.50 - 1.40 mg/dL Final   10/01/2019 0.78 0.50 - 1.40 mg/dL Final     POC Creatinine   Date Value Ref Range Status   06/04/2018 0.9 0.5 - 1.4 mg/dL Final              Passed - HBA1C is 7.9 or below and within 180 days     Hemoglobin A1C   Date Value Ref Range Status   09/25/2019 7.4 (H) 0.0 - 5.6 % Final     Comment:     Reference Interval:  5.0 - 5.6  Normal   5.7 - 6.4 High Risk   > 6.5 Diabetic    Hgb A1c results are standardized based on the (NGSP) National   Glycohemoglobin Standardization Program.    Hemoglobin A1C levels are related to mean serum/plasma glucose   during the preceding 2-3 months.        03/03/2019 7.1 (H) 0.0 - 5.6 % Final     Comment:     Reference Interval:  5.0 - 5.6 Normal   5.7 - 6.4 High Risk   > 6.5 Diabetic    Hgb A1c results are standardized based on the (NGSP) National   Glycohemoglobin Standardization Program.    Hemoglobin A1C levels are related to mean serum/plasma glucose   during the preceding 2-3 months.        10/10/2018 6.4 (H) 4.0 - 5.6 % Final     Comment:     ADA Screening Guidelines:  5.7-6.4%  Consistent with prediabetes  >or=6.5%  Consistent with diabetes  High levels of fetal hemoglobin interfere with the HbA1C  assay. Heterozygous hemoglobin variants (HbS, HgC, etc)do  not significantly interfere with this assay.   However, presence of multiple variants may affect accuracy.                Passed - eGFR is 30 or above and within 360 days     eGFR if non    Date Value Ref Range Status   11/05/2019 >60.0 >60 mL/min/1.73 m^2 Final     Comment:     Calculation used to obtain the estimated glomerular filtration  rate (eGFR) is the CKD-EPI equation.      10/25/2019 >60 >60 mL/min/1.73 m^2 Final     Comment:     Calculation used to obtain the estimated glomerular filtration  rate (eGFR) is the CKD-EPI equation.      10/01/2019 >60 >60 mL/min/1.73 m^2 Final     Comment:     Calculation used to obtain the estimated glomerular filtration  rate (eGFR) is the CKD-EPI equation.        eGFR if    Date Value Ref Range Status   11/05/2019 >60.0 >60 mL/min/1.73 m^2 Final   10/25/2019 >60 >60 mL/min/1.73 m^2 Final   10/01/2019 >60 >60 mL/min/1.73 m^2 Final             metoprolol tartrate (LOPRESSOR) 25 MG tablet 180 tablet 3     Sig: Take 1 tablet (25 mg total) by mouth 2 (two) times daily.        Cardiovascular:  Beta Blockers Passed - 1/13/2020  2:23 PM        Passed - Patient is at least 18 years old        Passed - Last BP in normal range within 360 days     BP Readings from Last 3 Encounters:   01/10/20 136/68   12/17/19 (!) 140/70   12/13/19 138/80              Passed - Last Heart Rate in normal range within 360 days     Pulse Readings from Last 3 Encounters:   01/10/20 67   12/17/19 71   12/13/19 67             Passed - Office visit in past 12 months or future 90 days     Recent Outpatient Visits            3 days ago Dermatitis    [facility] Kellen Michaels NP    3 weeks ago Pharyngoesophageal dysphagia    [facility]enter[facility] YARIEL Allen    1 month ago Dysphagia, unspecified type    [facility] Kellen Michaels NP    2 months ago Spinal cord stimulator status    [facility] Marilia Jean MD    2 months ago Arachnoiditis    [facility] Marty Iqbal MD          Future Appointments              In 1 week NURSE, [facility] DERMATOLOGY [facility] [place]    In 2 weeks Marilia Jean MD [facility] [place]               olmesartan (BENICAR) 40 MG tablet 90 tablet 3     Sig: Take 1 tablet (40 mg total) by mouth once daily.       Cardiovascular:  Angiotensin Receptor Blockers Passed - 1/13/2020  2:23 PM        Passed - Patient is at least 18 years old        Passed - Last BP in normal range within 360 days     BP Readings from Last 3 Encounters:   01/10/20 136/68   12/17/19 (!) 140/70   12/13/19 138/80              Passed - Office visit in past 12 months or future 90 days     Recent Outpatient Visits            3 days ago Dermatitis    [facility] Kellen Michaels NP    3 weeks ago Pharyngoesophageal dysphagia    [facility] [place]    1 month ago Dysphagia, unspecified type    [facility] Kellen Michaels NP    2  months ago Spinal cord stimulator status    [facility]    2 months ago Arachnoiditis    [facility] Marty Iqbal MD          Future Appointments              In 1 week NURSE, [facility] DERMATOLOGY [place] - Dermatology[facility]    In 2 weeks [place]ga P Fermo, [place] [facility] [place]                Passed - Cr is 1.4 or below and within 360 days     Creatinine   Date Value Ref Range Status   11/05/2019 0.9 0.5 - 1.4 mg/dL Final   10/25/2019 1.04 0.50 - 1.40 mg/dL Final   10/01/2019 0.78 0.50 - 1.40 mg/dL Final     POC Creatinine   Date Value Ref Range Status   06/04/2018 0.9 0.5 - 1.4 mg/dL Final              Passed - K in normal range and within 360 days     Potassium   Date Value Ref Range Status   11/05/2019 4.7 3.5 - 5.1 mmol/L Final   10/25/2019 5.2 (H) 3.5 - 5.1 mmol/L Final   10/01/2019 4.1 3.5 - 5.1 mmol/L Final              Passed - eGFR within 360 days     eGFR if non    Date Value Ref Range Status   11/05/2019 >60.0 >60 mL/min/1.73 m^2 Final     Comment:     Calculation used to obtain the estimated glomerular filtration  rate (eGFR) is the CKD-EPI equation.      10/25/2019 >60 >60 mL/min/1.73 m^2 Final     Comment:     Calculation used to obtain the estimated glomerular filtration  rate (eGFR) is the CKD-EPI equation.      10/01/2019 >60 >60 mL/min/1.73 m^2 Final     Comment:     Calculation used to obtain the estimated glomerular filtration  rate (eGFR) is the CKD-EPI equation.        eGFR if    Date Value Ref Range Status   11/05/2019 >60.0 >60 mL/min/1.73 m^2 Final   10/25/2019 >60 >60 mL/min/1.73 m^2 Final   10/01/2019 >60 >60 mL/min/1.73 m^2 Final             OXcarbazepine (TRILEPTAL) 300 MG Tab 540 tablet 3     Sig: Take 1 tablet (300 mg total) by mouth 2 (two) times daily. Take 3 tablets (900mg) bid       Anticonvulsants Protocol Passed - 1/13/2020  2:23 PM        Passed - Visit with Authorizing provider in past 9  months or upcoming 90 days       pantoprazole (PROTONIX) 20 MG tablet 180 tablet 1     Sig: Take 1 tablet (20 mg total) by mouth 2 (two) times daily.       Gastroenterology: Proton Pump Inhibitors Failed - 1/13/2020  2:23 PM        Failed - Plavix is not on active medication list        Failed - An appropriate indication is on the problem list        Passed - Patient is at least 18 years old        Passed - Osteoporosis is not on problem list        Passed - Office visit in past 6 months or future 90 days.     Recent Outpatient Visits            3 days ago Dermatitis    [facility] Kellen Michaels NP    3 weeks ago Pharyngoesophageal dysphagia    [facility] Gastroenterology YARIEL Allen    1 month ago Dysphagia, unspecified type    [facility] Kellen Michaels NP    2 months ago Spinal cord stimulator status    [facility]    2 months ago Arachnoiditis    [facility] Marty Iqbal MD          Future Appointments              In 1 week NURSE, [facility] DERMATOLOGY [place] - Dermatology[facility]    In 2 weeks [facility] [place]               pregabalin (LYRICA) 200 MG Cap 270 capsule 1     Sig: Take 1 capsule (200 mg total) by mouth 3 (three) times daily.       Anticonvulsants Protocol Passed - 1/13/2020  2:23 PM        Passed - Visit with Authorizing provider in past 9 months or upcoming 90 days       simvastatin (ZOCOR) 40 MG tablet 90 tablet 3     Sig: Take 1 tablet (40 mg total) by mouth every evening.       Cardiovascular:  Antilipid - Statins Passed - 1/13/2020  2:23 PM        Passed - Patient is at least 18 years old        Passed - Office visit in past 12 months or future 90 days     Recent Outpatient Visits            3 days ago Dermatitis    [facility] Kellen Michaels NP    3 weeks ago Pharyngoesophageal dysphagia    [facility] Gastroenterology [place]  YARIEL [place]    1 month ago Dysphagia, unspecified type    [facility] Kellen Michaels NP    2 months ago Spinal cord stimulator status    [facility] Olga P Fermo, MD    2 months ago Arachnoiditis    [facility] Marty Iqbal MD          Future Appointments              In 1 week NURSE, [facility] DERMATOLOGY [place] - DermatologyGabe    In 2 weeks Olga P Fermo, [place] [facility] [place]                Passed - Lipid Panel completed in last 360 days     Lab Results   Component Value Date    CHOL 96 (L) 09/26/2019    HDL 28 (L) 09/26/2019    LDLCALC 48.2 (L) 09/26/2019    TRIG 99 09/26/2019             Passed - ALT is 94 or below and within 360 days     ALT   Date Value Ref Range Status   12/17/2019 17 10 - 44 U/L Final   09/25/2019 26 10 - 44 U/L Final   03/16/2019 26 10 - 44 U/L Final              Passed - AST is 54 or below and within 360 days     AST   Date Value Ref Range Status   12/17/2019 16 10 - 40 U/L Final   09/25/2019 69 (H) 17 - 59 U/L Final   03/16/2019 27 17 - 59 U/L Final             tamsulosin (FLOMAX) 0.4 mg Cap 90 capsule 3     Sig: Take 1 capsule (0.4 mg total) by mouth once daily.       Urology: Alpha-Adrenergic Blocker Passed - 1/13/2020  2:23 PM        Passed - Patient is at least 18 years old        Passed - Last BP in normal range within 360 days     BP Readings from Last 3 Encounters:   01/10/20 136/68   12/17/19 (!) 140/70   12/13/19 138/80              Passed - Office visit in past 12 months or future 90 days     Recent Outpatient Visits            3 days ago Dermatitis    [facility] Kellen Michaels NP    3 weeks ago Pharyngoesophageal dysphagia    [facility] Gastroenterology YARIEL Allen    1 month ago Dysphagia, unspecified type    [facility] Kellen Michaels NP    2 months ago Spinal cord stimulator status    [facility] Olga P Fermo, MD    2 months ago  Arachnoiditis    [place] [facility] Marty Iqbal MD          Future Appointments              In 1 week NURSE, [facility] DERMATOLOGY [place] - DermatologyGabe    In 2 weeks Marilia Jean MD [facility]                Appointments  past 12m or future 3m with PCP    Date Provider   Last Visit   1/10/2020 Kellen Michaels NP   Next Visit   Visit date not found Kellen Michaels NP

## 2020-01-14 RX ORDER — OXCARBAZEPINE 600 MG/1
600 TABLET, FILM COATED ORAL 2 TIMES DAILY
Qty: 180 TABLET | Refills: 1 | Status: SHIPPED | OUTPATIENT
Start: 2020-01-14 | End: 2020-02-24 | Stop reason: SDUPTHER

## 2020-01-14 RX ORDER — METOPROLOL TARTRATE 25 MG/1
25 TABLET, FILM COATED ORAL 2 TIMES DAILY
Qty: 180 TABLET | Refills: 1 | Status: SHIPPED | OUTPATIENT
Start: 2020-01-14 | End: 2020-05-04 | Stop reason: SDUPTHER

## 2020-01-14 RX ORDER — SIMVASTATIN 40 MG/1
40 TABLET, FILM COATED ORAL NIGHTLY
Qty: 90 TABLET | Refills: 3 | Status: SHIPPED | OUTPATIENT
Start: 2020-01-14

## 2020-01-14 RX ORDER — PANTOPRAZOLE SODIUM 20 MG/1
20 TABLET, DELAYED RELEASE ORAL 2 TIMES DAILY
Qty: 180 TABLET | Refills: 1 | Status: SHIPPED | OUTPATIENT
Start: 2020-01-14 | End: 2020-02-09

## 2020-01-14 RX ORDER — CLOPIDOGREL BISULFATE 75 MG/1
75 TABLET ORAL DAILY
Qty: 90 TABLET | Refills: 3 | Status: SHIPPED | OUTPATIENT
Start: 2020-01-14

## 2020-01-14 RX ORDER — OXCARBAZEPINE 300 MG/1
300 TABLET, FILM COATED ORAL 2 TIMES DAILY
Qty: 540 TABLET | Refills: 3 | Status: CANCELLED | OUTPATIENT
Start: 2020-01-14

## 2020-01-14 RX ORDER — DULOXETIN HYDROCHLORIDE 30 MG/1
30 CAPSULE, DELAYED RELEASE ORAL DAILY
Qty: 90 CAPSULE | Refills: 1 | Status: SHIPPED | OUTPATIENT
Start: 2020-01-14 | End: 2020-04-16 | Stop reason: SDUPTHER

## 2020-01-14 RX ORDER — OLMESARTAN MEDOXOMIL 40 MG/1
40 TABLET ORAL DAILY
Qty: 90 TABLET | Refills: 3 | Status: SHIPPED | OUTPATIENT
Start: 2020-01-14

## 2020-01-14 RX ORDER — METFORMIN HYDROCHLORIDE 500 MG/1
500 TABLET, EXTENDED RELEASE ORAL
Qty: 90 TABLET | Refills: 1
Start: 2020-01-14 | End: 2020-01-31 | Stop reason: SDUPTHER

## 2020-01-14 RX ORDER — TAMSULOSIN HYDROCHLORIDE 0.4 MG/1
0.4 CAPSULE ORAL DAILY
Qty: 90 CAPSULE | Refills: 1 | Status: SHIPPED | OUTPATIENT
Start: 2020-01-14 | End: 2020-05-04 | Stop reason: SDUPTHER

## 2020-01-14 RX ORDER — AMLODIPINE BESYLATE 5 MG/1
5 TABLET ORAL NIGHTLY
Qty: 90 TABLET | Refills: 3 | Status: SHIPPED | OUTPATIENT
Start: 2020-01-14 | End: 2021-01-13

## 2020-01-14 RX ORDER — PREGABALIN 200 MG/1
200 CAPSULE ORAL 3 TIMES DAILY
Qty: 270 CAPSULE | Refills: 1 | Status: SHIPPED | OUTPATIENT
Start: 2020-01-14 | End: 2020-01-31 | Stop reason: SDUPTHER

## 2020-01-14 NOTE — TELEPHONE ENCOUNTER
Refill Routing Note     Medication(s) are not appropriate for processing by [facility]:    Medications outside of protocol/have not previously been prescribed by you        Automatic Epic Protocol Generated Data:    Requested Prescriptions   Pending Prescriptions Disp Refills    DULoxetine (CYMBALTA) 30 MG capsule 90 capsule 3     Sig: Take 1 capsule (30 mg total) by mouth once daily.       Psychiatry: Antidepressants - SNRI Passed - 1/13/2020  4:02 PM        Passed - Patient is at least 18 years old        Passed - Last BP in normal range within 360 days     BP Readings from Last 3 Encounters:   01/10/20 136/68   12/17/19 (!) 140/70   12/13/19 138/80              Passed - Office visit in past 6 months or future 90 days     Recent Outpatient Visits            4 days ago Dermatitis    [facility] Kellen Michaels NP    4 weeks ago Pharyngoesophageal dysphagia    [facility] Gastroenterology YARIEL Allen    1 month ago Dysphagia, unspecified type    [facility] Kellen Michaels NP    2 months ago [facility]inal cord stimulator [facility]    2 months ago Arachnoiditis    [facility] Marty Iqbal MD          Future Appointments              In 6 days NURSE, [facility] DERMATOLOGY [place] - DermatologyGabe    In 2 weeks Olga P Fermo, [place] [facility] [place]                Passed - Cr is 1.4 or below and within 360 days     Creatinine   Date Value Ref Range Status   11/05/2019 0.9 0.5 - 1.4 mg/dL Final   10/25/2019 1.04 0.50 - 1.40 mg/dL Final   10/01/2019 0.78 0.50 - 1.40 mg/dL Final     POC Creatinine   Date Value Ref Range Status   06/04/2018 0.9 0.5 - 1.4 mg/dL Final              Passed - eGFR within 360 days     eGFR if non    Date Value Ref Range Status   11/05/2019 >60.0 >60 mL/min/1.73 m^2 Final     Comment:     Calculation used to obtain the estimated  glomerular filtration  rate (eGFR) is the CKD-EPI equation.      10/25/2019 >60 >60 mL/min/1.73 m^2 Final     Comment:     Calculation used to obtain the estimated glomerular filtration  rate (eGFR) is the CKD-EPI equation.      10/01/2019 >60 >60 mL/min/1.73 m^2 Final     Comment:     Calculation used to obtain the estimated glomerular filtration  rate (eGFR) is the CKD-EPI equation.        eGFR if    Date Value Ref Range Status   11/05/2019 >60.0 >60 mL/min/1.73 m^2 Final   10/25/2019 >60 >60 mL/min/1.73 m^2 Final   10/01/2019 >60 >60 mL/min/1.73 m^2 Final             metoprolol tartrate (LOPRESSOR) 25 MG tablet 180 tablet 3     Sig: Take 1 tablet (25 mg total) by mouth 2 (two) times daily.       Cardiovascular:  Beta Blockers Passed - 1/13/2020  4:02 PM        Passed - Patient is at least 18 years old        Passed - Last BP in normal range within 360 days     BP Readings from Last 3 Encounters:   01/10/20 136/68   12/17/19 (!) 140/70   12/13/19 138/80              Passed - Last Heart Rate in normal range within 360 days     Pulse Readings from Last 3 Encounters:   01/10/20 67   12/17/19 71   12/13/19 67             Passed - Office visit in past 12 months or future 90 days     Recent Outpatient Visits            4 days ago Dermatitis    [facility] Kellen Michaels NP    4 weeks ago Pharyngoesophageal dysphagia    [facility] Gastroenterology YARIEL Allen    1 month ago Dysphagia, unspecified type    [facility] Kellen Michaels NP    2 months ago Spinal cord stimulator status    [facility] Olga P Fermo, MD    2 months ago Arachnoiditis    [facility] Marty Iqbal MD          Future Appointments              In 6 days NURSE, [facility] DERMATOLOGY [place] - DermatologyGabe    In 2 weeks Olga P Fermo, [place] [facility] [place]               OXcarbazepine (TRILEPTAL) 300 MG Tab 540 tablet 3     Sig: Take  1 tablet (300 mg total) by mouth 2 (two) times daily. Take 3 tablets (900mg) bid       Anticonvulsants Protocol Passed - 1/13/2020  4:02 PM        Passed - Visit with Authorizing provider in past 9 months or upcoming 90 days       pantoprazole (PROTONIX) 20 MG tablet 180 tablet 1     Sig: Take 1 tablet (20 mg total) by mouth 2 (two) times daily.       Gastroenterology: Proton Pump Inhibitors Failed - 1/13/2020  4:02 PM        Failed - Plavix is not on active medication list        Failed - An appropriate indication is on the problem list        Passed - Patient is at least 18 years old        Passed - Osteoporosis is not on problem list        Passed - Office visit in past 6 months or future 90 days.     Recent Outpatient Visits            4 days ago Dermatitis    [facility] Kellen Michaels NP    4 weeks ago Pharyngoesophageal dysphagia    [facility] Gastroenterology YARIEL Allen    1 month ago Dysphagia, unspecified type    [facility] Kellen Michaels NP    2 months ago Spinal cord stimulator status    [facility]    2 months ago Arachnoiditis    [facility] Marty Iqbal MD          Future Appointments              In 6 days NURSE, [facility] DERMATOLOGY [place] - DermatologyGabe    In 2 weeks [facility]               pregabalin (LYRICA) 200 MG Cap 270 capsule 1     Sig: Take 1 capsule (200 mg total) by mouth 3 (three) times daily.       Anticonvulsants Protocol Passed - 1/13/2020  4:02 PM        Passed - Visit with Authorizing provider in past 9 months or upcoming 90 days       tamsulosin (FLOMAX) 0.4 mg Cap 90 capsule 3     Sig: Take 1 capsule (0.4 mg total) by mouth once daily.       Urology: Alpha-Adrenergic Blocker Passed - 1/13/2020  4:02 PM        Passed - Patient is at least 18 years old        Passed - Last BP in normal range within 360 days     BP Readings from  Last 3 Encounters:   01/10/20 136/68   12/17/19 (!) 140/70   12/13/19 138/80              Passed - Office visit in past 12 months or future 90 days     Recent Outpatient Visits            4 days ago Dermatitis    [facility] Kellen Michaels NP    4 weeks ago Pharyngoesophageal dysphagia    [facility] Gastroenterology YARIEL Allen    1 month ago Dysphagia, unspecified type    [facility] Kellen Michaels NP    2 months ago Spinal cord stimulator status    [facility] Olga P Fermo, MD    2 months ago Arachnoiditis    [facility] Marty Iqbal MD          Future Appointments              In 6 days NURSE, [facility] DERMATOLOGY [place] - DermatologyGabe    In 2 weeks Olga P Fermo, [place] [facility] [place]

## 2020-01-14 NOTE — TELEPHONE ENCOUNTER
Call Documentation    Person Called: Patient Call Time: 10:57   Spoke with: Patient 01/14/2020        Reason for call: Clarification- dosage and frequency of oxcarbazepine      Patient stated: Pt stated he is taking oxcarbazepine 600 mg 1 tab BID        Current Medication Requested:   Requested Prescriptions     Pending Prescriptions Disp Refills    DULoxetine (CYMBALTA) 30 MG capsule 90 capsule 3     Sig: Take 1 capsule (30 mg total) by mouth once daily.    metoprolol tartrate (LOPRESSOR) 25 MG tablet 180 tablet 3     Sig: Take 1 tablet (25 mg total) by mouth 2 (two) times daily.    OXcarbazepine (TRILEPTAL) 300 MG Tab 540 tablet 3     Sig: Take 1 tablet (300 mg total) by mouth 2 (two) times daily. Take 3 tablets (900mg) bid    pantoprazole (PROTONIX) 20 MG tablet 180 tablet 1     Sig: Take 1 tablet (20 mg total) by mouth 2 (two) times daily.    pregabalin (LYRICA) 200 MG Cap 270 capsule 1     Sig: Take 1 capsule (200 mg total) by mouth 3 (three) times daily.    tamsulosin (FLOMAX) 0.4 mg Cap 90 capsule 3     Sig: Take 1 capsule (0.4 mg total) by mouth once daily.     Signed Prescriptions Disp Refills    amLODIPine (NORVASC) 5 MG tablet 90 tablet 3     Sig: Take 1 tablet (5 mg total) by mouth every evening.     Authorizing Provider: CHRISTY CHATTERJEE     Ordering User: SURAJ GUERRERO    clopidogrel (PLAVIX) 75 mg tablet 90 tablet 3     Sig: Take 1 tablet (75 mg total) by mouth once daily.     Authorizing Provider: CHRISTY CHATTERJEE     Ordering User: SURAJ GUERRERO    metFORMIN (GLUCOPHAGE-XR) 500 MG 24 hr tablet 90 tablet 1     Sig: Take 1 tablet (500 mg total) by mouth daily with breakfast.     Authorizing Provider: CHRISTY CHATTERJEE     Ordering User: SURAJ GUERRERO    olmesartan (BENICAR) 40 MG tablet 90 tablet 3     Sig: Take 1 tablet (40 mg total) by mouth once daily.     Authorizing Provider: CHRISTY CHATTERJEE     Ordering User: SURAJ GUERRERO    simvastatin (ZOCOR) 40 MG tablet 90  tablet 3     Sig: Take 1 tablet (40 mg total) by mouth every evening.     Authorizing Provider: CHRISTY CHATTERJEE     Ordering User: SURAJ GUERRERO

## 2020-01-15 ENCOUNTER — TELEPHONE (OUTPATIENT)
Dept: FAMILY MEDICINE | Facility: CLINIC | Age: 70
End: 2020-01-15

## 2020-01-15 NOTE — TELEPHONE ENCOUNTER
Spoke to pt and attempted to rescheduled appt. Pt states that his wife has an appt on 1/31 and would like to know if he can be seen that day. Pt insisted on asking Dr. Iqbal. Will you be here that day? Please advise.

## 2020-01-15 NOTE — TELEPHONE ENCOUNTER
----- Message from Azucena Lino sent at 1/15/2020  9:57 AM CST -----  Contact: Pt  Type:  Patient Returning Call    Who Called: Pt  Who Left Message for Patient: N/A  Does the patient know what this is regarding?: N/A  Best Call Back Number:  [phone]  Additional Information: Pt states he received a call to reschedule appt for 1/29/20. Pt would like an appt sooner, next appt book out until  05/25/20 and would like a call from nurse to schedule an appt sooner. Please call pt and advise.

## 2020-01-20 ENCOUNTER — TELEPHONE (OUTPATIENT)
Dept: FAMILY MEDICINE | Facility: CLINIC | Age: 70
End: 2020-01-20

## 2020-01-20 ENCOUNTER — CLINICAL SUPPORT (OUTPATIENT)
Dept: DERMATOLOGY | Facility: CLINIC | Age: 70
End: 2020-01-20
Payer: MEDICARE

## 2020-01-20 PROCEDURE — 99024 PR POST-OP FOLLOW-UP VISIT: ICD-10-PCS | Mod: S$GLB,,, | Performed by: DERMATOLOGY

## 2020-01-20 PROCEDURE — 99024 POSTOP FOLLOW-UP VISIT: CPT | Mod: S$GLB,,, | Performed by: DERMATOLOGY

## 2020-01-20 NOTE — PROGRESS NOTES
Suture Removal note:  CC: 69 y.o. male patient is here for suture removal.         HPI: Patient is s/p excision of possible reaction from the R upper arm and L chest on 1/10/2020.  Patient reports no problems.    WOUND PE:  Sutures intact.  Wound healing well.  Good approximation of skin edges.  No signs or symptoms of infection.    IMPRESSION:  Results pending.    PLAN:  Sutures removed today.  Continue wound care.

## 2020-01-20 NOTE — TELEPHONE ENCOUNTER
"Spoke to pt in lobby, concerned about Pharmacy not sending refills due to conflict/contraindication of plavicx and protonix, per Dr Iqbal, it is "ok for pt to cont medications", called [facility], spoke with Shashank verified that meds will be refill and spoke with pt, advised of conversations and pt satisfied.   "

## 2020-01-21 ENCOUNTER — PATIENT MESSAGE (OUTPATIENT)
Dept: DERMATOLOGY | Facility: CLINIC | Age: 70
End: 2020-01-21

## 2020-01-22 DIAGNOSIS — L98.9 DISEASE OF SKIN AND SUBCUTANEOUS TISSUE: Primary | ICD-10-CM

## 2020-01-22 LAB
FINAL PATHOLOGIC DIAGNOSIS: NORMAL
GROSS: NORMAL
MICROSCOPIC EXAM: NORMAL

## 2020-01-22 RX ORDER — TRIAMCINOLONE ACETONIDE 0.25 MG/G
CREAM TOPICAL
Qty: 30 G | Refills: 3 | Status: SHIPPED | OUTPATIENT
Start: 2020-01-22 | End: 2020-02-09

## 2020-01-24 ENCOUNTER — PATIENT MESSAGE (OUTPATIENT)
Dept: FAMILY MEDICINE | Facility: CLINIC | Age: 70
End: 2020-01-24

## 2020-01-24 DIAGNOSIS — E11.49 TYPE 2 DIABETES MELLITUS WITH OTHER NEUROLOGIC COMPLICATION, WITHOUT LONG-TERM CURRENT USE OF INSULIN: ICD-10-CM

## 2020-01-24 DIAGNOSIS — E11.42 DIABETIC POLYNEUROPATHY ASSOCIATED WITH TYPE 2 DIABETES MELLITUS: ICD-10-CM

## 2020-01-24 DIAGNOSIS — E78.2 MIXED HYPERLIPIDEMIA: ICD-10-CM

## 2020-01-24 DIAGNOSIS — R39.15 URINARY URGENCY: ICD-10-CM

## 2020-01-24 DIAGNOSIS — I10 ESSENTIAL HYPERTENSION: ICD-10-CM

## 2020-01-24 DIAGNOSIS — I73.9 PAD (PERIPHERAL ARTERY DISEASE): ICD-10-CM

## 2020-01-27 ENCOUNTER — PATIENT MESSAGE (OUTPATIENT)
Dept: FAMILY MEDICINE | Facility: CLINIC | Age: 70
End: 2020-01-27

## 2020-01-27 RX ORDER — METOPROLOL TARTRATE 25 MG/1
25 TABLET, FILM COATED ORAL 2 TIMES DAILY
Qty: 180 TABLET | Refills: 1 | OUTPATIENT
Start: 2020-01-27

## 2020-01-27 RX ORDER — ASPIRIN 81 MG/1
81 TABLET ORAL DAILY
Qty: 14 TABLET | Refills: 0 | Status: CANCELLED | OUTPATIENT
Start: 2020-01-27 | End: 2020-02-10

## 2020-01-27 RX ORDER — AMLODIPINE BESYLATE 5 MG/1
5 TABLET ORAL NIGHTLY
Qty: 90 TABLET | Refills: 3 | OUTPATIENT
Start: 2020-01-27 | End: 2021-01-26

## 2020-01-27 RX ORDER — TAMSULOSIN HYDROCHLORIDE 0.4 MG/1
0.4 CAPSULE ORAL DAILY
Qty: 90 CAPSULE | Refills: 1 | OUTPATIENT
Start: 2020-01-27

## 2020-01-27 RX ORDER — METFORMIN HYDROCHLORIDE 500 MG/1
500 TABLET, EXTENDED RELEASE ORAL
Qty: 90 TABLET | Refills: 1
Start: 2020-01-27

## 2020-01-27 RX ORDER — DULOXETIN HYDROCHLORIDE 30 MG/1
30 CAPSULE, DELAYED RELEASE ORAL DAILY
Qty: 90 CAPSULE | Refills: 1 | OUTPATIENT
Start: 2020-01-27

## 2020-01-27 RX ORDER — CLOPIDOGREL BISULFATE 75 MG/1
75 TABLET ORAL DAILY
Qty: 90 TABLET | Refills: 3 | OUTPATIENT
Start: 2020-01-27

## 2020-01-27 RX ORDER — PREGABALIN 200 MG/1
200 CAPSULE ORAL 3 TIMES DAILY
Qty: 270 CAPSULE | Refills: 1 | Status: CANCELLED | OUTPATIENT
Start: 2020-01-27

## 2020-01-27 RX ORDER — SIMVASTATIN 40 MG/1
40 TABLET, FILM COATED ORAL NIGHTLY
Qty: 90 TABLET | Refills: 3 | OUTPATIENT
Start: 2020-01-27

## 2020-01-27 NOTE — TELEPHONE ENCOUNTER
Refill Authorization Note     is requesting a refill authorization.    Brief assessment and rationale for refill: ROUTE; asipirin // Quick DC: duplicate request           Medication Therapy Plan: of note the medications listed below were escripted to [facility] 01/14/2020, including amlodipine, will Route asipirin, op     Medication reconciliation completed: No                         Comments:   Pended Medication(s)   Requested Prescriptions     Pending Prescriptions Disp Refills    pregabalin (LYRICA) 200 MG Cap 270 capsule 1     Sig: Take 1 capsule (200 mg total) by mouth 3 (three) times daily.    simvastatin (ZOCOR) 40 MG tablet 90 tablet 3     Sig: Take 1 tablet (40 mg total) by mouth every evening.    tamsulosin (FLOMAX) 0.4 mg Cap 90 capsule 1     Sig: Take 1 capsule (0.4 mg total) by mouth once daily.    aspirin (ECOTRIN) 81 MG EC tablet 14 tablet 0     Sig: Take 1 tablet (81 mg total) by mouth once daily. for 14 days    clopidogrel (PLAVIX) 75 mg tablet 90 tablet 3     Sig: Take 1 tablet (75 mg total) by mouth once daily.    DULoxetine (CYMBALTA) 30 MG capsule 90 capsule 1     Sig: Take 1 capsule (30 mg total) by mouth once daily.    metFORMIN (GLUCOPHAGE-XR) 500 MG 24 hr tablet 90 tablet 1     Sig: Take 1 tablet (500 mg total) by mouth daily with breakfast.    metoprolol tartrate (LOPRESSOR) 25 MG tablet 180 tablet 1     Sig: Take 1 tablet (25 mg total) by mouth 2 (two) times daily.    amLODIPine (NORVASC) 5 MG tablet 90 tablet 3     Sig: Take 1 tablet (5 mg total) by mouth every evening.

## 2020-01-27 NOTE — TELEPHONE ENCOUNTER
I did not see this patient before, can he wait until his visit to refill the medication at the end of the month at his appointment visit?

## 2020-01-27 NOTE — TELEPHONE ENCOUNTER
Please see the following assessment. This refill request still requires some action on your part. Aspirin is outside of OR protocol. Defer to you. Also, the other requested medications were sent to [facility] on 1/14/20. Thank you.

## 2020-01-29 ENCOUNTER — PATIENT OUTREACH (OUTPATIENT)
Dept: ADMINISTRATIVE | Facility: OTHER | Age: 70
End: 2020-01-29

## 2020-01-31 ENCOUNTER — OFFICE VISIT (OUTPATIENT)
Dept: FAMILY MEDICINE | Facility: CLINIC | Age: 70
End: 2020-01-31
Payer: MEDICARE

## 2020-01-31 ENCOUNTER — PATIENT MESSAGE (OUTPATIENT)
Dept: FAMILY MEDICINE | Facility: CLINIC | Age: 70
End: 2020-01-31

## 2020-01-31 ENCOUNTER — OFFICE VISIT (OUTPATIENT)
Dept: NEUROLOGY | Facility: CLINIC | Age: 70
End: 2020-01-31
Payer: MEDICARE

## 2020-01-31 ENCOUNTER — LAB VISIT (OUTPATIENT)
Dept: LAB | Facility: HOSPITAL | Age: 70
End: 2020-01-31
Attending: FAMILY MEDICINE
Payer: MEDICARE

## 2020-01-31 ENCOUNTER — TELEPHONE (OUTPATIENT)
Dept: NEUROLOGY | Facility: CLINIC | Age: 70
End: 2020-01-31

## 2020-01-31 VITALS — RESPIRATION RATE: 18 BRPM | HEART RATE: 80 BPM | SYSTOLIC BLOOD PRESSURE: 125 MMHG | DIASTOLIC BLOOD PRESSURE: 61 MMHG

## 2020-01-31 VITALS
OXYGEN SATURATION: 96 % | HEIGHT: 67 IN | HEART RATE: 74 BPM | DIASTOLIC BLOOD PRESSURE: 68 MMHG | TEMPERATURE: 99 F | SYSTOLIC BLOOD PRESSURE: 138 MMHG | WEIGHT: 275.38 LBS | BODY MASS INDEX: 43.22 KG/M2

## 2020-01-31 DIAGNOSIS — G03.9 ARACHNOIDITIS: ICD-10-CM

## 2020-01-31 DIAGNOSIS — I25.119 CORONARY ARTERY DISEASE INVOLVING NATIVE CORONARY ARTERY OF NATIVE HEART WITH ANGINA PECTORIS: ICD-10-CM

## 2020-01-31 DIAGNOSIS — R20.2 NUMBNESS AND TINGLING: ICD-10-CM

## 2020-01-31 DIAGNOSIS — I73.9 PAD (PERIPHERAL ARTERY DISEASE): ICD-10-CM

## 2020-01-31 DIAGNOSIS — E11.49 TYPE 2 DIABETES MELLITUS WITH OTHER NEUROLOGIC COMPLICATION, WITHOUT LONG-TERM CURRENT USE OF INSULIN: ICD-10-CM

## 2020-01-31 DIAGNOSIS — E11.49 TYPE 2 DIABETES MELLITUS WITH OTHER NEUROLOGIC COMPLICATION, WITHOUT LONG-TERM CURRENT USE OF INSULIN: Primary | ICD-10-CM

## 2020-01-31 DIAGNOSIS — I10 ESSENTIAL HYPERTENSION: ICD-10-CM

## 2020-01-31 DIAGNOSIS — R20.0 NUMBNESS AND TINGLING: ICD-10-CM

## 2020-01-31 DIAGNOSIS — E11.42 DIABETIC POLYNEUROPATHY ASSOCIATED WITH TYPE 2 DIABETES MELLITUS: ICD-10-CM

## 2020-01-31 DIAGNOSIS — G90.522 COMPLEX REGIONAL PAIN SYNDROME TYPE 1 OF LEFT LOWER EXTREMITY: ICD-10-CM

## 2020-01-31 DIAGNOSIS — M48.062 LUMBAR STENOSIS WITH NEUROGENIC CLAUDICATION: Primary | ICD-10-CM

## 2020-01-31 DIAGNOSIS — E78.2 MIXED HYPERLIPIDEMIA: ICD-10-CM

## 2020-01-31 DIAGNOSIS — Z96.89 SPINAL CORD STIMULATOR STATUS: ICD-10-CM

## 2020-01-31 PROBLEM — J69.0 ASPIRATION PNEUMONIA: Status: RESOLVED | Noted: 2019-09-25 | Resolved: 2020-01-31

## 2020-01-31 PROBLEM — J96.90 RESPIRATORY FAILURE: Status: RESOLVED | Noted: 2019-09-25 | Resolved: 2020-01-31

## 2020-01-31 LAB
ANION GAP SERPL CALC-SCNC: 9 MMOL/L (ref 8–16)
BASOPHILS # BLD AUTO: 0.04 K/UL (ref 0–0.2)
BASOPHILS NFR BLD: 0.7 % (ref 0–1.9)
BUN SERPL-MCNC: 9 MG/DL (ref 8–23)
CALCIUM SERPL-MCNC: 9.7 MG/DL (ref 8.7–10.5)
CHLORIDE SERPL-SCNC: 95 MMOL/L (ref 95–110)
CHOLEST SERPL-MCNC: 133 MG/DL (ref 120–199)
CHOLEST/HDLC SERPL: 2.8 {RATIO} (ref 2–5)
CO2 SERPL-SCNC: 27 MMOL/L (ref 23–29)
CREAT SERPL-MCNC: 1 MG/DL (ref 0.5–1.4)
DIFFERENTIAL METHOD: ABNORMAL
EOSINOPHIL # BLD AUTO: 0.3 K/UL (ref 0–0.5)
EOSINOPHIL NFR BLD: 4.4 % (ref 0–8)
ERYTHROCYTE [DISTWIDTH] IN BLOOD BY AUTOMATED COUNT: 17.6 % (ref 11.5–14.5)
EST. GFR  (AFRICAN AMERICAN): >60 ML/MIN/1.73 M^2
EST. GFR  (NON AFRICAN AMERICAN): >60 ML/MIN/1.73 M^2
ESTIMATED AVG GLUCOSE: 151 MG/DL (ref 68–131)
FOLATE SERPL-MCNC: 4.5 NG/ML (ref 4–24)
GLUCOSE SERPL-MCNC: 119 MG/DL (ref 70–110)
HBA1C MFR BLD HPLC: 6.9 % (ref 4–5.6)
HCT VFR BLD AUTO: 39.4 % (ref 40–54)
HDLC SERPL-MCNC: 48 MG/DL (ref 40–75)
HDLC SERPL: 36.1 % (ref 20–50)
HGB BLD-MCNC: 12.4 G/DL (ref 14–18)
IMM GRANULOCYTES # BLD AUTO: 0.02 K/UL (ref 0–0.04)
IMM GRANULOCYTES NFR BLD AUTO: 0.4 % (ref 0–0.5)
LDLC SERPL CALC-MCNC: 66.4 MG/DL (ref 63–159)
LYMPHOCYTES # BLD AUTO: 0.6 K/UL (ref 1–4.8)
LYMPHOCYTES NFR BLD: 10.3 % (ref 18–48)
MCH RBC QN AUTO: 26.3 PG (ref 27–31)
MCHC RBC AUTO-ENTMCNC: 31.5 G/DL (ref 32–36)
MCV RBC AUTO: 84 FL (ref 82–98)
MONOCYTES # BLD AUTO: 0.7 K/UL (ref 0.3–1)
MONOCYTES NFR BLD: 12.8 % (ref 4–15)
NEUTROPHILS # BLD AUTO: 4 K/UL (ref 1.8–7.7)
NEUTROPHILS NFR BLD: 71.4 % (ref 38–73)
NONHDLC SERPL-MCNC: 85 MG/DL
NRBC BLD-RTO: 0 /100 WBC
PLATELET # BLD AUTO: 243 K/UL (ref 150–350)
PMV BLD AUTO: 9 FL (ref 9.2–12.9)
POTASSIUM SERPL-SCNC: 5.2 MMOL/L (ref 3.5–5.1)
RBC # BLD AUTO: 4.71 M/UL (ref 4.6–6.2)
SODIUM SERPL-SCNC: 131 MMOL/L (ref 136–145)
TRIGL SERPL-MCNC: 93 MG/DL (ref 30–150)
VIT B12 SERPL-MCNC: 427 PG/ML (ref 210–950)
WBC # BLD AUTO: 5.64 K/UL (ref 3.9–12.7)

## 2020-01-31 PROCEDURE — 1101F PT FALLS ASSESS-DOCD LE1/YR: CPT | Mod: CPTII,S$GLB,, | Performed by: PSYCHIATRY & NEUROLOGY

## 2020-01-31 PROCEDURE — 82746 ASSAY OF FOLIC ACID SERUM: CPT

## 2020-01-31 PROCEDURE — 1159F PR MEDICATION LIST DOCUMENTED IN MEDICAL RECORD: ICD-10-PCS | Mod: S$GLB,,, | Performed by: FAMILY MEDICINE

## 2020-01-31 PROCEDURE — 99214 OFFICE O/P EST MOD 30 MIN: CPT | Mod: S$GLB,,, | Performed by: PSYCHIATRY & NEUROLOGY

## 2020-01-31 PROCEDURE — 1125F PR PAIN SEVERITY QUANTIFIED, PAIN PRESENT: ICD-10-PCS | Mod: S$GLB,,, | Performed by: PSYCHIATRY & NEUROLOGY

## 2020-01-31 PROCEDURE — 99214 OFFICE O/P EST MOD 30 MIN: CPT | Mod: S$GLB,,, | Performed by: FAMILY MEDICINE

## 2020-01-31 PROCEDURE — 99214 PR OFFICE/OUTPT VISIT, EST, LEVL IV, 30-39 MIN: ICD-10-PCS | Mod: S$GLB,,, | Performed by: FAMILY MEDICINE

## 2020-01-31 PROCEDURE — 3078F PR MOST RECENT DIASTOLIC BLOOD PRESSURE < 80 MM HG: ICD-10-PCS | Mod: CPTII,S$GLB,, | Performed by: FAMILY MEDICINE

## 2020-01-31 PROCEDURE — 80061 LIPID PANEL: CPT

## 2020-01-31 PROCEDURE — 3078F PR MOST RECENT DIASTOLIC BLOOD PRESSURE < 80 MM HG: ICD-10-PCS | Mod: CPTII,S$GLB,, | Performed by: PSYCHIATRY & NEUROLOGY

## 2020-01-31 PROCEDURE — 36415 COLL VENOUS BLD VENIPUNCTURE: CPT | Mod: PO

## 2020-01-31 PROCEDURE — 99999 PR PBB SHADOW E&M-EST. PATIENT-LVL III: CPT | Mod: PBBFAC,,, | Performed by: FAMILY MEDICINE

## 2020-01-31 PROCEDURE — 3044F PR MOST RECENT HEMOGLOBIN A1C LEVEL <7.0%: ICD-10-PCS | Mod: CPTII,S$GLB,, | Performed by: FAMILY MEDICINE

## 2020-01-31 PROCEDURE — 3078F DIAST BP <80 MM HG: CPT | Mod: CPTII,S$GLB,, | Performed by: PSYCHIATRY & NEUROLOGY

## 2020-01-31 PROCEDURE — 1159F MED LIST DOCD IN RCRD: CPT | Mod: S$GLB,,, | Performed by: PSYCHIATRY & NEUROLOGY

## 2020-01-31 PROCEDURE — 1101F PT FALLS ASSESS-DOCD LE1/YR: CPT | Mod: CPTII,S$GLB,, | Performed by: FAMILY MEDICINE

## 2020-01-31 PROCEDURE — 1159F MED LIST DOCD IN RCRD: CPT | Mod: S$GLB,,, | Performed by: FAMILY MEDICINE

## 2020-01-31 PROCEDURE — 3074F SYST BP LT 130 MM HG: CPT | Mod: CPTII,S$GLB,, | Performed by: PSYCHIATRY & NEUROLOGY

## 2020-01-31 PROCEDURE — 1159F PR MEDICATION LIST DOCUMENTED IN MEDICAL RECORD: ICD-10-PCS | Mod: S$GLB,,, | Performed by: PSYCHIATRY & NEUROLOGY

## 2020-01-31 PROCEDURE — 99999 PR PBB SHADOW E&M-EST. PATIENT-LVL V: ICD-10-PCS | Mod: PBBFAC,,, | Performed by: PSYCHIATRY & NEUROLOGY

## 2020-01-31 PROCEDURE — 85025 COMPLETE CBC W/AUTO DIFF WBC: CPT

## 2020-01-31 PROCEDURE — 80048 BASIC METABOLIC PNL TOTAL CA: CPT

## 2020-01-31 PROCEDURE — 3044F HG A1C LEVEL LT 7.0%: CPT | Mod: CPTII,S$GLB,, | Performed by: FAMILY MEDICINE

## 2020-01-31 PROCEDURE — 99499 RISK ADDL DX/OHS AUDIT: ICD-10-PCS | Mod: S$GLB,,, | Performed by: PSYCHIATRY & NEUROLOGY

## 2020-01-31 PROCEDURE — 99499 UNLISTED E&M SERVICE: CPT | Mod: S$GLB,,, | Performed by: PSYCHIATRY & NEUROLOGY

## 2020-01-31 PROCEDURE — 3078F DIAST BP <80 MM HG: CPT | Mod: CPTII,S$GLB,, | Performed by: FAMILY MEDICINE

## 2020-01-31 PROCEDURE — 3075F SYST BP GE 130 - 139MM HG: CPT | Mod: CPTII,S$GLB,, | Performed by: FAMILY MEDICINE

## 2020-01-31 PROCEDURE — 83036 HEMOGLOBIN GLYCOSYLATED A1C: CPT

## 2020-01-31 PROCEDURE — 99999 PR PBB SHADOW E&M-EST. PATIENT-LVL V: CPT | Mod: PBBFAC,,, | Performed by: PSYCHIATRY & NEUROLOGY

## 2020-01-31 PROCEDURE — 1125F AMNT PAIN NOTED PAIN PRSNT: CPT | Mod: S$GLB,,, | Performed by: PSYCHIATRY & NEUROLOGY

## 2020-01-31 PROCEDURE — 3075F PR MOST RECENT SYSTOLIC BLOOD PRESS GE 130-139MM HG: ICD-10-PCS | Mod: CPTII,S$GLB,, | Performed by: FAMILY MEDICINE

## 2020-01-31 PROCEDURE — 1101F PR PT FALLS ASSESS DOC 0-1 FALLS W/OUT INJ PAST YR: ICD-10-PCS | Mod: CPTII,S$GLB,, | Performed by: PSYCHIATRY & NEUROLOGY

## 2020-01-31 PROCEDURE — 1101F PR PT FALLS ASSESS DOC 0-1 FALLS W/OUT INJ PAST YR: ICD-10-PCS | Mod: CPTII,S$GLB,, | Performed by: FAMILY MEDICINE

## 2020-01-31 PROCEDURE — 82607 VITAMIN B-12: CPT

## 2020-01-31 PROCEDURE — 99499 RISK ADDL DX/OHS AUDIT: ICD-10-PCS | Mod: S$GLB,,, | Performed by: FAMILY MEDICINE

## 2020-01-31 PROCEDURE — 99999 PR PBB SHADOW E&M-EST. PATIENT-LVL III: ICD-10-PCS | Mod: PBBFAC,,, | Performed by: FAMILY MEDICINE

## 2020-01-31 PROCEDURE — 1125F PR PAIN SEVERITY QUANTIFIED, PAIN PRESENT: ICD-10-PCS | Mod: S$GLB,,, | Performed by: FAMILY MEDICINE

## 2020-01-31 PROCEDURE — 99499 UNLISTED E&M SERVICE: CPT | Mod: S$GLB,,, | Performed by: FAMILY MEDICINE

## 2020-01-31 PROCEDURE — 99214 PR OFFICE/OUTPT VISIT, EST, LEVL IV, 30-39 MIN: ICD-10-PCS | Mod: S$GLB,,, | Performed by: PSYCHIATRY & NEUROLOGY

## 2020-01-31 PROCEDURE — 3074F PR MOST RECENT SYSTOLIC BLOOD PRESSURE < 130 MM HG: ICD-10-PCS | Mod: CPTII,S$GLB,, | Performed by: PSYCHIATRY & NEUROLOGY

## 2020-01-31 PROCEDURE — 1125F AMNT PAIN NOTED PAIN PRSNT: CPT | Mod: S$GLB,,, | Performed by: FAMILY MEDICINE

## 2020-01-31 RX ORDER — PREGABALIN 200 MG/1
200 CAPSULE ORAL 3 TIMES DAILY
Qty: 270 CAPSULE | Refills: 1 | Status: SHIPPED | OUTPATIENT
Start: 2020-01-31 | End: 2020-04-16 | Stop reason: SDUPTHER

## 2020-01-31 RX ORDER — METFORMIN HYDROCHLORIDE 500 MG/1
500 TABLET, EXTENDED RELEASE ORAL
Qty: 30 TABLET | Refills: 0
Start: 2020-01-31 | End: 2020-02-03 | Stop reason: SDUPTHER

## 2020-01-31 RX ORDER — CITALOPRAM 20 MG/1
20 TABLET, FILM COATED ORAL DAILY
Qty: 30 TABLET | Refills: 0 | Status: SHIPPED | OUTPATIENT
Start: 2020-01-31 | End: 2020-02-05 | Stop reason: SDUPTHER

## 2020-01-31 RX ORDER — METFORMIN HYDROCHLORIDE 500 MG/1
500 TABLET, EXTENDED RELEASE ORAL
Qty: 30 TABLET | Refills: 0
Start: 2020-01-31 | End: 2020-01-31 | Stop reason: SDUPTHER

## 2020-01-31 NOTE — PATIENT INSTRUCTIONS
Eating Heart-Healthy Food: Using the DASH Plan    Eating for your heart doesnt have to be hard or boring. You just need to know how to make healthier choices. The DASH eating plan has been developed to help you do just that. DASH stands for Dietary Approaches to Stop Hypertension. It is a plan that has been proven to be healthier for your heart and to lower your risk for high blood pressure. It can also help lower your risk for cancer, heart disease, osteoporosis, and diabetes.  Choosing from each food group  Choose foods from each of the food groups below each day. Try to get the recommended number of servings for each food group. The serving numbers are based on a diet of 2,000 calories a day. Talk to your doctor if youre unsure about your calorie needs. Along with getting the correct servings, the DASH plan also recommends a sodium intake less than 2,300 mg per day.        Grains  Servings: 6 to 8 a day  A serving is:  · 1 slice bread  · 1 ounce dry cereal  · Half a cup cooked rice, pasta or cereal  Best choices: Whole grains and any grains high in fiber. Vegetables  Servings: 4 to 5 a day  A serving is:  · 1 cup raw leafy vegetable  · Half a cup cut-up raw or cooked vegetable  · Half a cup vegetable juice  Best choices: Fresh or frozen vegetables prepared without added salt or fat.   Fruits  Servings: 4 to 5 a day  A serving is:  · 1 medium fruit  · One-quarter cup dried fruit  · Half a cup fresh, frozen, or canned fruit  · Half a cup of 100% fruit juices  Best choices: A variety of fresh fruits of different colors. Whole fruits are a better choice than fruit juices. Low-fat or fat-free dairy  Servings: 2 to 3 a day  A serving is:  · 1 cup milk  · 1 cup yogurt  · One and a half ounces cheese  Best choices: Skim or 1% milk, low-fat or fat-free yogurt or buttermilk, and low-fat cheeses.         Lean meats, poultry, fish  Servings: 6 or fewer a day  A serving is:  · 1 ounce cooked meats, poultry, or fish  · 1  egg  Best choices: Lean poultry and fish. Trim away visible fat. Broil, grill, roast, or boil instead of frying. Remove skin from poultry before eating. Limit how much red meat you eat.  Nuts, seeds, beans  Servings: 4 to 5 a week  A serving is:  · One-third cup nuts (one and a half ounces)  · 2 tablespoons nut butter or seeds  · Half a cup cooked dry beans or legumes  Best choices: Dry roasted nuts with no salt added, lentils, kidney beans, garbanzo beans, and whole sparsk beans.   Fats and oils  Servings: 2 to 3 a day  A serving is:  · 1 teaspoon vegetable oil  · 1 teaspoon soft margarine  · 1 tablespoon mayonnaise  · 2 tablespoons salad dressing  Best choices: Nut and vegetable oils (nontropical vegetable oils), such as olive and canola oil. Sweets  Servings: 5 a week or fewer  A serving is:  · 1 tablespoon sugar, maple syrup, or honey  · 1 tablespoon jam or jelly  · 1 half-ounce jelly beans (about 15)  · 1 cup lemonade  Best choices: Dried fruit can be a satisfying sweet. Choose low-fat sweets. And watch your serving sizes!      For more on the DASH eating plan, visit:  www.nhlbi.nih.gov/health/health-topics/topics/dash   Date Last Reviewed: 6/1/2016  © 2653-1210 [employer]. [address] [place] [address], [place] [address]. All rights reserved. This information is not intended as a substitute for professional medical care. Always follow your healthcare professional's instructions.

## 2020-01-31 NOTE — PATIENT INSTRUCTIONS
Please call our clinic at [phone] or send a message to Dr. Jean on the [employer] portal if there are any changes to the plan described below, for example,if you are not contacted for the requested tests, referral(s) within one week, if you are unable to receive the medications prescribed, or if you feel you need to change the treatment course for any reason.     TESTING:  -- none     REFERRALS:  -- refer to PT for Aquatherapy (payton)    PREVENTION OF PAIN:   -- continue trileptal 600mg twice a day   -- continue Lyrica 200mg three times per day at this time, in the FUTURE may raise   -- continue duloxetine 30mg daily     AS-NEEDED TREATMENT OF PAIN:  -- continue tylenol 1000mg three times per day as needed

## 2020-01-31 NOTE — PROGRESS NOTES
Date of service: 1/31/2020  Referring provider: No ref. provider found    Subjective:      Chief complaint: Follow-up       Patient ID: Domingo Vaca is a 69 y.o. gentleman with lumbar arachnoiditis, peripheral artery disease, type 2 diabetes, history of cervical fusion, history of lumbar fusion, history of left ankle fusion, CRPS of left lower extremity who is presenting for follow up of pain     History of Present Illness    INTERVAL HISTORY - 1/31/2020     Mr. Caroline Hand was last seen 3 months ago.  At that point we had him try various Trileptal doses.  He reports that at 900 mg twice a day his vision was blurred so he went back to 600 twice a day which resolved the blurred vision and it does provide significant relief of the pain.    Today he reports he has little worse.  Specifically there is increasing weakness, leg pain, and numbness in his legs. He has been relying more on his power wheelchair.  He can only take a few steps before he has pain. He feels that the left leg is almost useless.  Whereas previously he was numb in the thigh he is now numb below the knee into the calf region.  The right leg is hurting more, not a shock-like pain but a deep pain. The Trileptal 600 b.i.d. does provided significant relief to the neuralgic pain. He fell 5 times in the last 6 weeks due to his legs giving way.    He feels that his memory has been poor and his concentration has been poor.    Recently he has had some difficulty swallowing, as of the last 3 months, he could not have an endoscopy given that he was recently placed on Plavix after he had a stent, thus he had an EGD which showed pooling in the vallecula but no other abnormalities.  It was suggested to him that may be this has something to do with the anterior fusion.    INTERVAL HISTORY - 10/30/19     The last visit was 1 month ago at which point I started Trileptal.  In the interim he was admitted to [facility] from 9/26/19 to 10/1/19 for acute respiratory early  failure, aspiration pneumonia, over narcosis, placement of drug eluting stent.  During the hospitalization his opiate regimen was changed.  Afterwards he message me that he was out of narcotics, they had not been helping anyway, we mutually decided to stop them.  He has been off opiates for 1 month and has been doing very well.  He reports there was no worsening of pain for doing so.  In the hospital the Trileptal was raise to 600 b.i.d. any reports it is helping 20% with his neuralgic pain down the legs. He is tolerating it without any side effects. He reports he does not think the Lyrica 200 mg t.i.d. is helping.    INTERVAL HISTORY - 9/24/19     He was last seen in the clinic approximately 5-6 months ago.     His chronic pain history is complicated, and in my opinion is representation of lumbar stenosis with neurogenic claudication, arachnoiditis, complex regional pain syndrome, diabetic polyneuropathy, facet arthropathy.  I made several recommendations for treating arachnoiditis pain. He was following with Dr. Lazaro Orantes.    In the interim, Dr. Orantes tried to rotate to Nucynta which was denied by his insurance.    He is currently on Lyrica 200 mg t.i.d..  In August Em Orantes raised his oxycodone to 15 mg 4 times a day.  He has not had any further procedures.    Today he is much worse. He reports his pain is progressing. He is unable to walk, using wheelchair for this visit. At home he ambulates short distances with extreme pain, he reports Dr. Iqbal is trying to order a power wheelchair for this purpose.  Otherwise he has a walker.    He is in pain 100% of the time.  4-5 times per day he will have severe, sudden, episodes of stabbing and burning from the buttocks to the toes in the right leg, and the knee to the toes in the left leg.  The right buttocks is constantly sore.  Afterwards there is a remaining soreness in the legs.    The oxycodone 15mg is not helping at all, even when he takes 2-3 in a span of a  couple hours.     He has developed mild urinary incontinence.    His current pain score is 10.       ORIGINAL PAIN HISTORY - 4/4/19   Age at onset and course over time:  He has a long history of chronic low back pain due to flat back, congenitally narrow canal. He had a previous L4-S1 PSIF with Dr Grimes in 2014 followed by SCS placement with Dr Navarro at [facility] in 2015. He has had 7 surgeries total. He had numerous epidural steroids in the past with long-term failure and he was only getting modest relief after the spinal cord stimulator.  Prior to surgery the pain was in the low back, radiating down his thighs into the calves bilaterally worse on the right.  He had no weakness or urinary symptoms.  Pain was interfering with his sleep, sitting, ambulation.    The pain became worse in late 2017. He had a posterior fusion at L2-S1 and the L2-4 decompression with Dr. Camp on 03/06/2018.  Post-op there was immediate left leg weakness and numbness. On exam in the hospital, there was absence of left patellar reflex and weak left knee extension more than left hip flexion. I thought this was a post-op femoral neuropathy from abdominal compression during surgery. Per Dr. Camp's note, there was intra-op left L4 nerve root injury due to scar tissue dissection.    In May 2018 the right leg pain returned suddenly. He had a NCS/EMG 5/24/18 with Dr. Joyner which showed severe incomplete L4 radiculopathy and bilateral chronic S1 radiculopathy.     He did have bilateral stents in the lower extremities in 2016 so a CTA was done to check the patency.  The stents were patent.  There was residual peripheral arterial occlusive disease in the right common femoral artery and the right popliteal artery.  The left superficial femoral artery was occluded. Dr. Arroyo felt that this anatomically could not explain his pain.      Within the last year his morphine went from 30mg daily to 60mg daily. This did nothing. He consulted with Dr. Willis  Rolanda and had an intrathecal morphine trial. This was ineffective. Then tried dilaudid intrathecally without effect, I do not know the doses.      Left leg is numb from hip to knee. There is a burning pain in the left knee. He reports he can handle this, the problem is his right leg which has excruciating pain from the hip to the ankle. Muscles feel on fire. He had this before the surgery but now it feels more muscular. He reports that before, his muscles were not giving out on him.     He was admitted to [facility] with acute upper GI bleed causing hypovolemic and possible septic shock. Needed pressors and blood products. He had a GI ulcer.     Back pain is manageable with the stimulator.  The stimulator does also cover the left lower extremity for the CRPS.    Location:  Right buttocks, down the hamstring, into the calf and lateral ankle  Radiation:    Quality: numb burning type pain feels like a constant patricia horse pain   Severity: 9  Duration: constant with escalations after 5-10 min of walking   Frequency: daily   Alleviated by: laying down  Exacerbated by: sitting is better than walking, but both hurt. He has to sit down for a while to make it go away.   Associated with: right leg buckling; no right leg numbness, no swelling, no temperature change, no bowel or bladder  Sleep habits:    Current acute treatment:  Tylenol 1000mg TID   (plavix)    Current prevention:  Trileptal 600 b.i.d.  Lyrica 200 mg 3 times per day  duloxetine 30mg daily - started 3/28/19   (cellexa)    Previously tried:   Oxycodone 15 mg #120 per month  -- levorphanol 2mg - no response   -- morphine ER 30mg BID   -- gabapentin 1200mg TID - ineffective  -- percocet 10mg/325mg - 1.5 - 2 hours of relief only; takes 3 to 4 per day     Procedural history:       Review of patient's allergies indicates:   Allergen Reactions    Glipizide Rash     Current Outpatient Medications   Medication Sig Dispense Refill    acetaminophen (TYLENOL) 500 MG tablet  Take 1,000 mg by mouth every 6 (six) hours as needed for Pain.      amLODIPine (NORVASC) 5 MG tablet Take 1 tablet (5 mg total) by mouth every evening. 90 tablet 3    aspirin (ECOTRIN) 81 MG EC tablet Take 1 tablet (81 mg total) by mouth once daily. for 14 days (Patient taking differently: Take 81 mg by mouth every evening. ) 14 tablet 0    citalopram (CELEXA) 20 MG tablet Take 1 tablet (20 mg total) by mouth once daily. 30 tablet 0    clopidogrel (PLAVIX) 75 mg tablet Take 1 tablet (75 mg total) by mouth once daily. 90 tablet 3    DULoxetine (CYMBALTA) 30 MG capsule Take 1 capsule (30 mg total) by mouth once daily. 90 capsule 1    metFORMIN (GLUCOPHAGE-XR) 500 MG 24 hr tablet Take 1 tablet (500 mg total) by mouth daily with breakfast. 30 tablet 0    metoprolol tartrate (LOPRESSOR) 25 MG tablet Take 1 tablet (25 mg total) by mouth 2 (two) times daily. 180 tablet 1    olmesartan (BENICAR) 40 MG tablet Take 1 tablet (40 mg total) by mouth once daily. 90 tablet 3    OXcarbazepine (TRILEPTAL) 600 MG Tab Take 1 tablet (600 mg total) by mouth 2 (two) times daily. 180 tablet 1    pregabalin (LYRICA) 200 MG Cap Take 1 capsule (200 mg total) by mouth 3 (three) times daily. 270 capsule 1    simvastatin (ZOCOR) 40 MG tablet Take 1 tablet (40 mg total) by mouth every evening. 90 tablet 3    tamsulosin (FLOMAX) 0.4 mg Cap Take 1 capsule (0.4 mg total) by mouth once daily. 90 capsule 1    pantoprazole (PROTONIX) 20 MG tablet Take 1 tablet (20 mg total) by mouth 2 (two) times daily. 180 tablet 1    triamcinolone acetonide 0.025% (KENALOG) 0.025 % cream AAA bid (Patient not taking: Reported on 1/31/2020) 30 g 3    triamcinolone acetonide 0.1% (KENALOG) 0.1 % ointment Apply topically 2 (two) times daily. (Patient not taking: Reported on 1/31/2020) 453.6 g 1     No current facility-administered medications for this visit.        Past Medical History  Past Medical History:   Diagnosis Date    Adhesive arachnoiditis  10/2019    Adjacent segment disease with spinal stenosis 02/2018    Anticoagulant long-term use     Arthritis     Cervical stenosis of spine     Chronic cervical pain     Chronic lumbar pain     Claudication of both lower extremities     Coronary artery disease     1 stent     Disorder of kidney and ureter     History of right common carotid artery stent placement     HTN (hypertension)     Hyperlipidemia     Lumbar facet arthropathy 02/2018    Lumbar stenosis with neurogenic claudication 02/2018    Obesity     PVD (peripheral vascular disease)     stents 1 left; 2 right    Respiratory failure 9/25/2019    RSD lower limb     Left ankle    S/P insertion of spinal cord stimulator     not working    S/P lumbar spinal fusion     multiple       Past Surgical History  Past Surgical History:   Procedure Laterality Date    ABDOMINAL AORTOGRAPHY N/A 9/30/2019    Procedure: Aortogram, Abdominal;  Surgeon: Kael Woo MD;  Location: [facility] CATH;  Service: Cardiology;  Laterality: N/A;    ANGIOGRAM, CORONARY, WITH LEFT HEART CATHETERIZATION  11/5/2019    Procedure: Angiogram, Coronary, with Left Heart Cath;  Surgeon: Rivera Soto MD;  Location: [facility] CATH LAB;  Service: Cardiology;;    ANGIOPLASTY      Aortagram with Runoff; stent leg    ANKLE SURGERY Left     Multiple surgeries; now fused    APPENDECTOMY      BACK SURGERY      7 total    BRONCHOSCOPY Right 3/19/2019    Procedure: Bronchoscopy;  Surgeon: Antony Coelho Jr. DO;  Location: [facility] ENDO;  Service: Pulmonary;  Laterality: Right;    CAROTID STENT Left     CARPAL TUNNEL RELEASE Bilateral     left twice; right once    CERVICAL FUSION      over 5 levels    COLONOSCOPY  ~2015    normal findings per patient report    CORONARY ANGIOGRAPHY N/A 9/27/2019    Procedure: ANGIOGRAM, CORONARY ARTERY;  Surgeon: Michael Ruiz MD;  Location: [facility] CATH;  Service: Cardiology;  Laterality: N/A;    CORONARY STENT PLACEMENT N/A 11/5/2019     Procedure: INSERTION, STENT, CORONARY ARTERY;  Surgeon: Rivera Soto MD;  Location: [facility] CATH LAB;  Service: Cardiology;  Laterality: N/A;    ESOPHAGOGASTRODUODENOSCOPY N/A 3/16/2019    Procedure: EGD (ESOPHAGOGASTRODUODENOSCOPY)-in icu;  Surgeon: Angel Mckeon MD;  Location: [facility] ENDO;  Service: Endoscopy;  Laterality: N/A;  A large amount of food (residue) in the stomach, esophageal ulcer    ESOPHAGOGASTRODUODENOSCOPY Left 3/18/2019    Procedure: EGD (ESOPHAGOGASTRODUODENOSCOPY)- in ICU on drip;  Surgeon: Angel Mckeon MD;  Location: [facility] ENDO;  Service: Endoscopy;  Laterality: Left; esophageal ulcer, Erythematous mucosa was found in the gastric body, consistent with NG tube trauma    LEFT HEART CATHETERIZATION Left 9/27/2019    Procedure: Left heart cath;  Surgeon: Michael Ruiz MD;  Location: [facility] CATH;  Service: Cardiology;  Laterality: Left;    LEFT HEART CATHETERIZATION Right 9/30/2019    Procedure: Left heart cath;  Surgeon: Kael Woo MD;  Location: [facility] CATH;  Service: Cardiology;  Laterality: Right;    pain injection      Multiple    PERCUTANEOUS TRANSLUMINAL BALLOON ANGIOPLASTY OF CORONARY ARTERY  9/30/2019    Procedure: Angioplasty-coronary;  Surgeon: Kael Woo MD;  Location: [facility] CATH;  Service: Cardiology;;    POSTERIOR FUSION LUMBAR SPINE W/ CORPECTOMY      twice in last 18 mos (3/2016)    SPINAL CORD STIMULATOR IMPLANT  implanted 12/8/15    having great pain relief    SYMPATHECTOMY         Family History  Family History   Problem Relation Age of Onset    No Known Problems Mother     Heart disease Father     Early death Father 56        MI    Glaucoma Neg Hx     Colon cancer Neg Hx     Colon polyps Neg Hx     Crohn's disease Neg Hx     Esophageal cancer Neg Hx     Stomach cancer Neg Hx     Ulcerative colitis Neg Hx        Social History  Social History     Socioeconomic History    Marital status:      Spouse name: Not on  file    Number of children: Not on file    Years of education: Not on file    Highest education level: Not on file   Occupational History    Not on file   Social Needs    Financial resource strain: Somewhat hard    Food insecurity:     Worry: Often true     Inability: Often true    Transportation needs:     Medical: Yes     Non-medical: Yes   Tobacco Use    Smoking status: Former Smoker     Packs/day: 2.00     Years: 45.00     Pack years: 90.00     Types: Cigarettes     Last attempt to quit: 2009     Years since quittin.0    Smokeless tobacco: Never Used    Tobacco comment: vapes with nicotine since    Substance and Sexual Activity    Alcohol use: No     Frequency: Never     Binge frequency: Never    Drug use: No    Sexual activity: Not Currently     Partners: Female   Lifestyle    Physical activity:     Days per week: 0 days     Minutes per session: 0 min    Stress: To some extent   Relationships    Social connections:     Talks on phone: Once a week     Gets together: Never     Attends Islam service: Not on file     Active member of club or organization: No     Attends meetings of clubs or organizations: Never     Relationship status:    Other Topics Concern    Not on file   Social History Narrative    Not on file        Review of Systems  14-point review of systems as follows:   No check yomi indicates NEGATIVE response   Constitutional: [] weight loss, [] change to appetite   Eyes: [x] change in vision, [x] double vision   Ears, nose, mouth, throat: [] frequent nose bleeds, [] ringing in the ears   Respiratory: [] cough, [] wheezing   Cardiovascular: [] chest pain, [] palpitations   Gastrointestinal: [] jaundice, [] nausea/vomiting   Genitourinary: [] incontinence, [] burning with urination   Hematologic/lymphatic: [] easy bruising/bleeding, [] night sweats   Neurological: [x] numbness, [x] weakness   Endocrine: [] fatigue, [] heat/cold intolerance   Allergy/Immunologic:  [] fevers, [] chills   Musculoskeletal: [x] muscle pain, [x] joint pain   Psychiatric: [] thoughts of harming self/others, [] depression   Integumentary: [] rashes, [] sores that do not heal     Objective:        Vitals:    01/31/20 1330   BP: 125/61   Pulse: 80   Resp: 18     There is no height or weight on file to calculate BMI.     Appears calm and comfortable     4/4/19   Constitutional: appears in no acute distress, well-developed, well-nourished     Eyes: normal conjunctiva, PERRLA     Ears, nose, mouth, throat: external appearance of ears and nose normal, hearing intact     Cardiovascular: regular rate and rhythm, no murmurs appreciated    Respiratory: unlabored respirations, breath sounds normal bilaterally    Gastrointestinal: no visible abdominal masses, no guarding, no visible hernia    Musculoskeletal:   All muscle groups of the upper extremities are 5/5  The right leg is without atrophy  The left leg shows muscle atrophy in the quads and below the knee  The left leg is with mottling below the knee, slight rubor in the foot, mild swelling of the foot, several healed ulcers, portions of the left leg or cooler than the right.  This is his baseline CRPS.  With full effort he does demonstrate short durations of full strength in all lower extremity muscle groups bilaterally except for the left ankle which is surgically fused  His gait is antalgic    Psychiatric: normal judgment and insight. Oriented to person, place, and time.     Neurologic:   Cortical functions: recent and remote memory intact, normal attention span and concentration, speech fluent, adequate fund of knowledge   Cranial nerves: EOMI, symmetric facial strength  Reflexes: 2+ in the upper and lower extremities, no Rojo  Sensation:    -- There is reduced sensation to temperature in the left C7 dermatome  -- there is loss of light touch, temperature, pinprick sensation in the entire left lower extremity without a dermatomal preference,  proprioception and vibration are absent on left  -- there is preservation of light touch, temperature, pinprick in the entire right leg except for the dorsum of the foot.  Vibration is slightly reduced at the right toe, proprioception intact on the right  Coordination: normal    Data Review:     I have personally reviewed the referring provider's notes, labs, & imaging made available to me today.     RADIOLOGY STUDIES:  I have personally reviewed the pertinent images performed.     Results for orders placed or performed during the hospital encounter of 03/16/19   CT Head Without Contrast    Narrative    EXAMINATION:  CT HEAD WITHOUT CONTRAST    CPT: 73992    CLINICAL HISTORY:  Confusion/delirium, altered LOC, unexplained;Syncope/fainting;.    TECHNIQUE:  Axial CT slices through the head were obtained without the administration of contrast.  Sagittal and coronal reconstructions were performed.  Automated exposure control was utilized.  Total DLP is approximately 674 mGy cm.    COMPARISON:  None available.    FINDINGS:  Mild-to-moderate generalized involutional changes are seen.  No evidence of acute intracranial hemorrhage, mass effect, midline deviation, hydrocephalus, or abnormal extra-axial fluid collection is visualized.  There appears to be  periventricular and deep white matter the hypoattenuation which is nonspecific, but is most commonly associated with chronic microvascular ischemic changes.  No evidence of acute large vessel territory ischemia/infarction is appreciated.  MRI with diffusion-weighted imaging is more sensitive in the assessment of acute ischemia/infarction.  The basilar cisterns are preserved. Minimal mucosal thickening in the lateral left maxillary sinus is seen.  The mastoid air cells appear to be grossly clear.  No acute displaced calvarial fracture is visualized.      Impression    1. No acute intracranial abnormality is visualized.      Electronically signed by: Ruslan Crowe  MD  Date:    03/17/2019  Time:    12:31     04/5/2018 x-ray lumbar spine  Pedicle screws with janice fixation L2-L3-L4-L5 S1  Disc prosthesis L4-5 and L5-S1  Anterior listhesis of L2 on L3  Retrolisthesis of L3 on L4  Prior laminectomy  Facet arthropathy at multiple levels  Dorsal column stimulator at T9  No hardware complication    03/10/2018 myelogram with CT lumbar spine  -- soft tissue/longitudinal ligament thickening along the posterior vertebral body contours and extending caudally from L1 to the sacral elements.    -- This produces significant multilevel moderate to severe constriction of the thecal sac with noted crowding of the cauda equina.  These changes are most significant at L1-2 through L3-4.    At L3-4, there is a large right asymmetric focal disc protrusion resulting in posterior and leftward displacement of the thecal sac and its contents.  Additionally, there is redemonstrated metallic structure adjacent to the left thecal sac contour in the region of the lateral recess at L4-5; this is unchanged in comparison to the prior.    Stable postoperative and extensive degenerative changes of the lumbar spine.  No evidence of organized fluid collection, acute spinal column disruption, or other significant interval alteration is appreciated.    Lab Results   Component Value Date     (L) 11/05/2019    K 4.7 11/05/2019    MG 2.5 03/16/2019    CL 88 (L) 11/05/2019    CO2 24 11/05/2019    BUN 10 11/05/2019    CREATININE 0.9 11/05/2019     11/05/2019    HGBA1C 7.4 (H) 09/25/2019    AST 16 12/17/2019    ALT 17 12/17/2019    ALBUMIN 4.0 12/17/2019    PROT 7.3 12/17/2019    BILITOT 0.3 12/17/2019    CHOL 96 (L) 09/26/2019    HDL 28 (L) 09/26/2019    LDLCALC 48.2 (L) 09/26/2019    TRIG 99 09/26/2019       Lab Results   Component Value Date    WBC 6.25 11/05/2019    HGB 11.5 (L) 11/05/2019    HCT 35.5 (L) 11/05/2019    MCV 81 (L) 11/05/2019     11/05/2019       Lab Results   Component Value Date     TSH 0.497 09/25/2019       Assessment & Plan:       Problem List Items Addressed This Visit        Neuro    Lumbar stenosis with neurogenic claudication - Primary    Overview     Status post multiple epidural steroid injections and seven back surgeries, most recent L2-S1 fusion 3/6/18          Relevant Orders    Ambulatory consult to Physical Therapy    Complex regional pain syndrome type 1 of left lower extremity    Relevant Orders    Ambulatory consult to Physical Therapy    Diabetic polyneuropathy associated with type 2 diabetes mellitus    Overview     No large fiber neuropathy on NCS 5/24/18 but patient with sensory loss in the feet, most likely a diabetic small fiber neuropathy         Relevant Orders    Ambulatory consult to Physical Therapy    Spinal cord stimulator status    Overview     Controlling his back pain and left leg CRPS pain            ID    Arachnoiditis    Overview     Patient most likely has lumbar he has of arachnoiditis after multiple lumbar procedures in 7 surgeries  Most likely the cause of his severe, progressive, right lower extremity pain refractory to surgery (in fact worse after surgery)    At initial consult we had discussed - use of neuro modulating opiate like Nucynta or methadone (nucynta was denied by insurance), lumbar sympathetic blocks, neuropathic intrathecal agents (bupivacaine, clonidine, Prialt)    His pain is showing opiate refractory qualities thus I do not think rotation to methadone is appropriate this point.  Rather, his worst pain is paroxysmal lumbar neuralgia, most appropriate medication would be Trileptal as this has excellent neuralgia coverage         Relevant Orders    Ambulatory consult to Physical Therapy              Please call our clinic at [phone] or send a message to Dr. Jean on the [employer] portal if there are any changes to the plan described below, for example,if you are not contacted for the requested tests, referral(s) within one week, if you  are unable to receive the medications prescribed, or if you feel you need to change the treatment course for any reason.     TESTING:  -- none     REFERRALS:  -- refer to PT for Aquatherapy (payton)    PREVENTION OF PAIN:   -- continue trileptal 600mg twice a day   -- continue Lyrica 200mg three times per day at this time, in the FUTURE may raise   -- continue duloxetine 30mg daily     AS-NEEDED TREATMENT OF PAIN:  -- continue tylenol 1000mg three times per day as needed     Follow up in about 2 months (around 3/31/2020) for office visit.      Marilia Jean MD

## 2020-02-01 ENCOUNTER — PATIENT MESSAGE (OUTPATIENT)
Dept: FAMILY MEDICINE | Facility: CLINIC | Age: 70
End: 2020-02-01

## 2020-02-01 DIAGNOSIS — E11.49 TYPE 2 DIABETES MELLITUS WITH OTHER NEUROLOGIC COMPLICATION, WITHOUT LONG-TERM CURRENT USE OF INSULIN: ICD-10-CM

## 2020-02-03 ENCOUNTER — PATIENT MESSAGE (OUTPATIENT)
Dept: NEUROLOGY | Facility: CLINIC | Age: 70
End: 2020-02-03

## 2020-02-03 DIAGNOSIS — E11.49 TYPE 2 DIABETES MELLITUS WITH OTHER NEUROLOGIC COMPLICATION, WITHOUT LONG-TERM CURRENT USE OF INSULIN: ICD-10-CM

## 2020-02-03 RX ORDER — METFORMIN HYDROCHLORIDE 500 MG/1
500 TABLET, EXTENDED RELEASE ORAL
Qty: 30 TABLET | Refills: 0 | Status: SHIPPED | OUTPATIENT
Start: 2020-02-03 | End: 2020-02-05

## 2020-02-04 ENCOUNTER — TELEPHONE (OUTPATIENT)
Dept: CARDIOLOGY | Facility: CLINIC | Age: 70
End: 2020-02-04

## 2020-02-04 ENCOUNTER — PATIENT MESSAGE (OUTPATIENT)
Dept: FAMILY MEDICINE | Facility: CLINIC | Age: 70
End: 2020-02-04

## 2020-02-04 ENCOUNTER — PATIENT MESSAGE (OUTPATIENT)
Dept: NEUROLOGY | Facility: CLINIC | Age: 70
End: 2020-02-04

## 2020-02-04 ENCOUNTER — PATIENT MESSAGE (OUTPATIENT)
Dept: CARDIOLOGY | Facility: CLINIC | Age: 70
End: 2020-02-04

## 2020-02-04 DIAGNOSIS — E11.42 DIABETIC POLYNEUROPATHY ASSOCIATED WITH TYPE 2 DIABETES MELLITUS: ICD-10-CM

## 2020-02-04 DIAGNOSIS — G03.9 ARACHNOIDITIS: Primary | ICD-10-CM

## 2020-02-04 DIAGNOSIS — Z98.1 S/P LUMBAR SPINAL FUSION: ICD-10-CM

## 2020-02-04 DIAGNOSIS — M48.062 LUMBAR STENOSIS WITH NEUROGENIC CLAUDICATION: ICD-10-CM

## 2020-02-04 NOTE — TELEPHONE ENCOUNTER
----- Message from Nydia Lewis sent at 2/4/2020  8:41 AM CST -----  Contact: patient  Type: Needs Medical Advice    Who Called:  Patient  Best Call Back Number:   Additional Information: Per patient requesting a call back in regards to a stint card-please advise-thank you

## 2020-02-05 ENCOUNTER — PATIENT MESSAGE (OUTPATIENT)
Dept: FAMILY MEDICINE | Facility: CLINIC | Age: 70
End: 2020-02-05

## 2020-02-05 RX ORDER — METFORMIN HYDROCHLORIDE 500 MG/1
TABLET, EXTENDED RELEASE ORAL
Qty: 90 TABLET | Refills: 1 | Status: SHIPPED | OUTPATIENT
Start: 2020-02-05 | End: 2020-03-04 | Stop reason: SDUPTHER

## 2020-02-05 NOTE — PROGRESS NOTES
Refill Authorization Note     is requesting a refill authorization.    Brief assessment and rationale for refill: APPROVE: prr                                         Comments:   Requested Prescriptions   Pending Prescriptions Disp Refills    metFORMIN (GLUCOPHAGE-XR) 500 MG XR 24hr tablet [Pharmacy Med Name: METFORMIN ER 500MG 24HR TABS] 90 tablet 1     Sig: TAKE 1 TABLET(500 MG) BY MOUTH DAILY WITH BREAKFAST       Endocrinology:  Diabetes - Biguanides Passed - 2/5/2020 10:20 AM        Passed - Patient is at least 18 years old        Passed - Office visit in past 12 months or future 90 days.     Recent Outpatient Visits            5 days ago Narcotic dependence    [facility] Flaquita Mahajan MD    5 days ago Lumbar stenosis [facility]genic claudication    [facility]    3 weeks [facility]    1 month ago Pharyngoesophageal dysphagia    [facility] YARIEL Allen    1 month ago Dysphagia, unspecified type    [facility] Kellen Michaels NP          Future Appointments              In 2 months Olga P Fermo, MD [facility]    In 2 months Flaquita Mahajan MD [facility] [place]                Passed - Cr is 1.3 or below and within 360 days     Creatinine   Date Value Ref Range Status   01/31/2020 1.0 0.5 - 1.4 mg/dL Final   11/05/2019 0.9 0.5 - 1.4 mg/dL Final   10/25/2019 1.04 0.50 - 1.40 mg/dL Final     POC Creatinine   Date Value Ref Range Status   06/04/2018 0.9 0.5 - 1.4 mg/dL Final              Passed - HBA1C is 7.9 or below and within 180 days     Hemoglobin A1C   Date Value Ref Range Status   01/31/2020 6.9 (H) 4.0 - 5.6 % Final     Comment:     ADA Screening Guidelines:  5.7-6.4%  Consistent with prediabetes  >or=6.5%  Consistent with diabetes  High levels of fetal hemoglobin interfere with the HbA1C  assay. Heterozygous  hemoglobin variants (HbS, HgC, etc)do  not significantly interfere with this assay.   However, presence of multiple variants may affect accuracy.     09/25/2019 7.4 (H) 0.0 - 5.6 % Final     Comment:     Reference Interval:  5.0 - 5.6 Normal   5.7 - 6.4 High Risk   > 6.5 Diabetic    Hgb A1c results are standardized based on the (NGSP) National   Glycohemoglobin Standardization Program.    Hemoglobin A1C levels are related to mean serum/plasma glucose   during the preceding 2-3 months.        03/03/2019 7.1 (H) 0.0 - 5.6 % Final     Comment:     Reference Interval:  5.0 - 5.6 Normal   5.7 - 6.4 High Risk   > 6.5 Diabetic    Hgb A1c results are standardized based on the (NGSP) National   Glycohemoglobin Standardization Program.    Hemoglobin A1C levels are related to mean serum/plasma glucose   during the preceding 2-3 months.                   Passed - eGFR is 30 or above and within 360 days     eGFR if non    Date Value Ref Range Status   01/31/2020 >60.0 >60 mL/min/1.73 m^2 Final     Comment:     Calculation used to obtain the estimated glomerular filtration  rate (eGFR) is the CKD-EPI equation.      11/05/2019 >60.0 >60 mL/min/1.73 m^2 Final     Comment:     Calculation used to obtain the estimated glomerular filtration  rate (eGFR) is the CKD-EPI equation.      10/25/2019 >60 >60 mL/min/1.73 m^2 Final     Comment:     Calculation used to obtain the estimated glomerular filtration  rate (eGFR) is the CKD-EPI equation.        eGFR if    Date Value Ref Range Status   01/31/2020 >60.0 >60 mL/min/1.73 m^2 Final   11/05/2019 >60.0 >60 mL/min/1.73 m^2 Final   10/25/2019 >60 >60 mL/min/1.73 m^2 Final

## 2020-02-06 ENCOUNTER — PATIENT MESSAGE (OUTPATIENT)
Dept: NEUROLOGY | Facility: CLINIC | Age: 70
End: 2020-02-06

## 2020-02-07 RX ORDER — CITALOPRAM 20 MG/1
20 TABLET, FILM COATED ORAL DAILY
Qty: 90 TABLET | Refills: 1 | Status: SHIPPED | OUTPATIENT
Start: 2020-02-07 | End: 2020-04-16 | Stop reason: ALTCHOICE

## 2020-02-07 NOTE — TELEPHONE ENCOUNTER
Refill Authorization Note     is requesting a refill authorization.    Brief assessment and rationale for refill: APPROVE: prr                                         Comments:   Requested Prescriptions   Signed Prescriptions Disp Refills    citalopram (CELEXA) 20 MG tablet 90 tablet 1     Sig: Take 1 tablet (20 mg total) by mouth once daily.       Psychiatry:  Antidepressants - SSRI Passed - 2/5/2020  8:46 AM        Passed - Patient is at least 18 years old        Passed - Office visit in past 6 months or future 90 days.     Recent Outpatient Visits            1 week ago Narcotic dependence    [facility] Flaquita Mahajan MD    1 week ago Lumbar stenosis with neurogenic claudication    [facility]    4 weeks ago Dermatitis    [facility] Kellen Michaels NP    1 month ago Pharyngoesophageal dysphagia    [facility] YARIEL Allen    1 month ago Dysphagia, unspecified type    [facility] Kellen Michaels NP          Future Appointments              In 4 days Mark Lucas, PT [facility] [place]    In 2 months Olga P Fermo, MD [facility]    In 2 months Flaquita Mahajan MD [facility]                 Appointments  past 12m or future 3m with PCP    Date Provider   Last Visit   1/31/2020 Flaquita Mahajan MD   Next Visit   2/6/2020 Flaquita Mahajan MD   .  ED visits in past 90 days: [unfilled]       Note composed:10:54 AM 02/07/2020

## 2020-02-08 ENCOUNTER — PATIENT MESSAGE (OUTPATIENT)
Dept: DERMATOLOGY | Facility: CLINIC | Age: 70
End: 2020-02-08

## 2020-02-09 PROBLEM — F11.20 NARCOTIC DEPENDENCE: Status: RESOLVED | Noted: 2019-09-25 | Resolved: 2020-02-09

## 2020-02-10 ENCOUNTER — TELEPHONE (OUTPATIENT)
Dept: DERMATOLOGY | Facility: CLINIC | Age: 70
End: 2020-02-10

## 2020-02-10 DIAGNOSIS — E11.49 TYPE 2 DIABETES MELLITUS WITH OTHER NEUROLOGIC COMPLICATION, WITHOUT LONG-TERM CURRENT USE OF INSULIN: ICD-10-CM

## 2020-02-10 NOTE — TELEPHONE ENCOUNTER
----- Message from Angie Pineda sent at 2/10/2020  8:29 AM CST -----  Contact: self [phone]  Patient is returning nurse's phone call.  Please call patient back at [phone].

## 2020-02-10 NOTE — PROGRESS NOTES
Subjective:       Patient ID: Domingo Vaca is a 69 y.o. male.    Chief Complaint: Establish Care    HPI     The patient is coming here today to establish a new primary care physician.    Diabetes:  The last hemoglobin A1c was slightly elevated at 7.4, the patient is currently taking metformin, almost about to run out of the medication, the patient is asking for a new prescription.  The patient did not bring a log of the fingerstick blood glucose today.  He has been trying to eat healthier.  The patient complains of numbness and tingling sensation on the feet.    Hypertension:  The patient takes amlodipine 5 mg and Benicar 40 mg, denies any side effects of medication, he did not bring a log of the blood pressure readings from home.    Hyperlipidemia:  The patient currently is taking simvastatin 40 mg at bedtime, he denies any side effects of the medication.    Neuropathy:  The patient is currently taking Lyrica secondary to neuropathy, the symptoms are stable will current medications, the patient needs new prescriptions.    Coronary artery disease:  The patient is currently under the care of the cardiologist, taking Plavix.    Past medical history, past social history was reviewed and discussed with the patient.    Review of Systems   Constitutional: Negative for activity change and unexpected weight change.   HENT: Negative for hearing loss, rhinorrhea and trouble swallowing.    Eyes: Negative for discharge and visual disturbance.   Respiratory: Negative for chest tightness and wheezing.    Cardiovascular: Negative for chest pain and palpitations.   Gastrointestinal: Negative for blood in stool, constipation, diarrhea and vomiting.   Endocrine: Negative for polydipsia and polyuria.   Genitourinary: Negative for difficulty urinating, hematuria and urgency.   Musculoskeletal: Positive for arthralgias, joint swelling and neck pain.   Skin: Negative for color change and pallor.   Neurological: Positive for weakness.  Negative for headaches.   Psychiatric/Behavioral: Positive for dysphoric mood. Negative for confusion.       Objective:      Physical Exam   Constitutional: He appears well-developed and well-nourished. No distress.   Limited ambulation, the patient is in a wheelchair   HENT:   Head: Normocephalic and atraumatic.   Right Ear: External ear normal.   Left Ear: External ear normal.   Nose: Nose normal.   Mouth/Throat: No oropharyngeal exudate.   Eyes: Pupils are equal, round, and reactive to light. Right eye exhibits no discharge. Left eye exhibits no discharge. No scleral icterus.   Neck: Normal range of motion. Neck supple. No tracheal deviation present. No thyromegaly present.   Cardiovascular: Normal rate, regular rhythm and normal heart sounds. Exam reveals no friction rub.   No murmur heard.  Pulmonary/Chest: Effort normal and breath sounds normal. No respiratory distress. He has no wheezes.   Abdominal: Soft. Bowel sounds are normal. There is no tenderness. There is no guarding.   Musculoskeletal: He exhibits edema (Trace of bilateral lower extremities). He exhibits no tenderness.   Neurological: No cranial nerve deficit. Coordination normal.   Skin: Skin is warm and dry. Capillary refill takes less than 2 seconds. No rash noted. He is not diaphoretic. No erythema. No pallor.   Psychiatric: He has a normal mood and affect. His behavior is normal. Judgment and thought content normal.   Nursing note and vitals reviewed.      Assessment:       1. Type 2 diabetes mellitus with other neurologic complication, without long-term current use of insulin    2. Diabetic polyneuropathy associated with type 2 diabetes mellitus    3. Narcotic dependence    4. Coronary artery disease involving native coronary artery of native heart with angina pectoris    5. Essential hypertension    6. Mixed hyperlipidemia    7. PAD (peripheral artery disease)    8. Numbness and tingling        Plan:       Type 2 diabetes mellitus with other  neurologic complication, without long-term current use of insulin:  uncontrolled  -     Discontinue: metFORMIN (GLUCOPHAGE-XR) 500 MG 24 hr tablet; Take 1 tablet (500 mg total) by mouth daily with breakfast.  Dispense: 30 tablet; Refill: 0  -     Lipid panel; Future; Expected date: 01/31/2020  -     Basic metabolic panel; Future; Expected date: 01/31/2020  -     Hemoglobin A1c; Future; Expected date: 01/31/2020    Diabetic polyneuropathy associated with type 2 diabetes mellitus:  Well controlled  -     pregabalin (LYRICA) 200 MG Cap; Take 1 capsule (200 mg total) by mouth 3 (three) times daily.  Dispense: 270 capsule; Refill: 1    Coronary artery disease involving native coronary artery of native heart with angina pectoris:  Stable  -     CBC auto differential; Future; Expected date: 01/31/2020    Essential hypertension:  Stable    Mixed hyperlipidemia:  Well controlled    PAD (peripheral artery disease):  Stable    Numbness and tingling:  New problem workup needed  -     Vitamin B12; Future; Expected date: 01/31/2020  -     Folate; Future; Expected date: 01/31/2020    Healthy habits, weight fried foods, red meat and processed starches, eat more vegetables, more salads, drink plenty water.The patient's BMI has been recorded in the chart. The patient has been provided educational materials regarding the benefits of attaining and maintaining a normal weight. We will continue to address and follow this issue during follow up visits.Patient agreed with assessment and plan. Patient verbalized understanding.

## 2020-02-10 NOTE — TELEPHONE ENCOUNTER
Pt of Dr. Vargas,    Please review and advise.     Allergies confirmed on 02/10/2020 @ 7:40 AM.    Pt has been using double doses of children's benadryl & TMC 0.1% ointment on rash located on bottom of forearms, top and back of legs and buttocks.    Pt states rash is an painful itch.     Last seen in clinic on 1/10/2020.

## 2020-02-11 ENCOUNTER — PATIENT MESSAGE (OUTPATIENT)
Dept: DERMATOLOGY | Facility: CLINIC | Age: 70
End: 2020-02-11

## 2020-02-11 ENCOUNTER — PATIENT MESSAGE (OUTPATIENT)
Dept: FAMILY MEDICINE | Facility: CLINIC | Age: 70
End: 2020-02-11

## 2020-02-11 ENCOUNTER — PATIENT MESSAGE (OUTPATIENT)
Dept: NEUROLOGY | Facility: CLINIC | Age: 70
End: 2020-02-11

## 2020-02-11 DIAGNOSIS — L98.9 DISEASE OF SKIN AND SUBCUTANEOUS TISSUE: Primary | ICD-10-CM

## 2020-02-11 NOTE — TELEPHONE ENCOUNTER
The hemoglobin A1c was therapeutic for diabetes, but he needs to make sure to monitor the fingerstick blood glucose to make sure the glucose levels do not go very high.  Advised also the patient to decrease carbohydrate intake, eat more vegetables, smaller portions, to minimize the risk for increase she glucose levels.  Thank you

## 2020-02-12 ENCOUNTER — PATIENT MESSAGE (OUTPATIENT)
Dept: NEUROLOGY | Facility: CLINIC | Age: 70
End: 2020-02-12

## 2020-02-12 RX ORDER — PREDNISONE 20 MG/1
TABLET ORAL
Qty: 42 TABLET | Refills: 0 | Status: SHIPPED | OUTPATIENT
Start: 2020-02-12 | End: 2020-05-01

## 2020-02-13 RX ORDER — METFORMIN HYDROCHLORIDE 500 MG/1
TABLET, EXTENDED RELEASE ORAL
Qty: 90 TABLET | Refills: 1 | OUTPATIENT
Start: 2020-02-13

## 2020-02-13 RX ORDER — CITALOPRAM 20 MG/1
20 TABLET, FILM COATED ORAL DAILY
Qty: 90 TABLET | Refills: 1 | OUTPATIENT
Start: 2020-02-13 | End: 2020-03-14

## 2020-02-13 NOTE — PROGRESS NOTES
Refill Authorization Note     is requesting a refill authorization.    Brief assessment and rationale for refill: QUICK DC: rts 8/20               Medication reconciliation completed: No                         Comments:   Last Prescribed Info:     Ordering Provider: Flaquita Mahajan MD ANGELA #:  [id number] NPI:  [id number]    Authorizing Provider: Flaquita Mahajan MD ANGELA #:  [id number] NPI:  [id number]    Ordering User:  Jaden Madrid, PharmD               Original Order:  citalopram (CELEXA) 20 MG tablet [[id number]]      Pharmacy:  [facility] - [address] ANGELA #:  --     Pharmacy Comments:  --          Fill quantity remaining:  -- Fill quantity used:  -- Next fill due: --       Outpatient Medication Detail      Disp Refills Start End    citalopram (CELEXA) 20 MG tablet 90 tablet 1 2/7/2020 3/8/2020    Sig - Route: Take 1 tablet (20 mg total) by mouth once daily. - Oral    Sent to pharmacy as: citalopram (CELEXA) 20 MG tablet    E-Prescribing Status: Receipt confirmed by pharmacy (2/7/2020 10:54 AM CST)           Last Prescribed Info:        Ordering Provider: Flaquita Mahajan MD ANGELA #:  [id number] NPI:  [id number]    Authorizing Provider: Flaquita Mahajan MD ANGELA #:  [id number] NPI:  [id number]    Ordering User:  Jaden Madrid, PharmD            Diagnosis Association: Type 2 diabetes mellitus with other neurologic complication, without long-term current use of insulin (E11.49)      Original Order:  metFORMIN (GLUCOPHAGE-XR) 500 MG 24 hr tablet [[id number]]      [facility]  [id number] [id number] [address] AT [facility] [address]  ANGELA #:  [id number]     Pharmacy Comments:  --          Fill quantity remaining:  -- Fill quantity used:  -- Next fill due: --       Outpatient Medication Detail      Disp Refills Start End    metFORMIN (GLUCOPHAGE-XR) 500 MG XR 24hr tablet 90 tablet 1 2/5/2020     Sig: TAKE 1 TABLET(500 MG) BY MOUTH DAILY WITH BREAKFAST    Sent  to pharmacy as: metFORMIN (GLUCOPHAGE-XR) 500 MG XR 24hr tablet    Notes to Pharmacy: Please delete all prior scripts with same name and strength including on holds.    E-Prescribing Status: Receipt confirmed by pharmacy (2/5/2020 10:28 AM CST)

## 2020-02-21 ENCOUNTER — PATIENT MESSAGE (OUTPATIENT)
Dept: NEUROLOGY | Facility: CLINIC | Age: 70
End: 2020-02-21

## 2020-02-22 NOTE — TELEPHONE ENCOUNTER
Patient will need to switch over to [facility] cash price $9 per 30 days or $24 per 90 days; they have oxcarbazepine 300mg tabs. He will need 120 tabs per month for dose of 600mg BID. Waiting to hear from him if this is OK with him and which [facility]

## 2020-02-24 ENCOUNTER — PATIENT MESSAGE (OUTPATIENT)
Dept: NEUROLOGY | Facility: CLINIC | Age: 70
End: 2020-02-24

## 2020-02-24 RX ORDER — OXCARBAZEPINE 300 MG/1
600 TABLET, FILM COATED ORAL 2 TIMES DAILY
Qty: 120 TABLET | Refills: 3 | Status: SHIPPED | OUTPATIENT
Start: 2020-02-24 | End: 2020-06-09 | Stop reason: SDUPTHER

## 2020-03-04 ENCOUNTER — PATIENT MESSAGE (OUTPATIENT)
Dept: FAMILY MEDICINE | Facility: CLINIC | Age: 70
End: 2020-03-04

## 2020-03-04 DIAGNOSIS — E11.49 TYPE 2 DIABETES MELLITUS WITH OTHER NEUROLOGIC COMPLICATION, WITHOUT LONG-TERM CURRENT USE OF INSULIN: ICD-10-CM

## 2020-03-04 RX ORDER — METFORMIN HYDROCHLORIDE 500 MG/1
TABLET, EXTENDED RELEASE ORAL
Qty: 15 TABLET | Refills: 0 | Status: SHIPPED | OUTPATIENT
Start: 2020-03-04 | End: 2020-03-09

## 2020-03-06 ENCOUNTER — PATIENT MESSAGE (OUTPATIENT)
Dept: FAMILY MEDICINE | Facility: CLINIC | Age: 70
End: 2020-03-06

## 2020-03-09 RX ORDER — METFORMIN HYDROCHLORIDE 500 MG/1
TABLET, EXTENDED RELEASE ORAL
Qty: 90 TABLET | Refills: 1 | Status: SHIPPED | OUTPATIENT
Start: 2020-03-09 | End: 2020-03-15 | Stop reason: SDUPTHER

## 2020-03-09 NOTE — PROGRESS NOTES
Refill Authorization Note     is requesting a refill authorization.    Brief assessment and rationale for refill: APPROVE: prr                                         Comments:     Requested Prescriptions   Signed Prescriptions Disp Refills    metFORMIN (GLUCOPHAGE-XR) 500 MG XR 24hr tablet 90 tablet 1     Sig: TAKE 1 TABLET(500 MG) BY MOUTH DAILY WITH BREAKFAST       Endocrinology:  Diabetes - Biguanides Passed - 3/6/2020 12:46 PM        Passed - Patient is at least 18 years old        Passed - Office visit in past 12 months or future 90 days.     Recent Outpatient Visits            1 month ago Type 2 diabetes mellitus with other neurologic complication, without long-term current use of insulin    [facility] Flaquita Mahajan MD    1 month ago Lumbar stenosis with neurogenic claudication    [facility]    1 month ago [facility]ermatitis    [facility]    2 months ago Pharyngoesophageal dysphagia    [facility] YARIEL Allen    2 months ago Dysphagia, unspecified type    [facility] Kellen Michaels NP          Future Appointments              In 1 month [facility]    In 1 month Flaquita Mahajan MD [facility]                Passed - Cr is 1.3 or below and within 360 days     Creatinine   Date Value Ref Range Status   01/31/2020 1.0 0.5 - 1.4 mg/dL Final   11/05/2019 0.9 0.5 - 1.4 mg/dL Final   10/25/2019 1.04 0.50 - 1.40 mg/dL Final     POC Creatinine   Date Value Ref Range Status   06/04/2018 0.9 0.5 - 1.4 mg/dL Final              Passed - HBA1C is 7.9 or below and within 180 days     Hemoglobin A1C   Date Value Ref Range Status   01/31/2020 6.9 (H) 4.0 - 5.6 % Final     Comment:     ADA Screening Guidelines:  5.7-6.4%  Consistent with prediabetes  >or=6.5%  Consistent with diabetes  High levels of fetal hemoglobin interfere  with the HbA1C  assay. Heterozygous hemoglobin variants (HbS, HgC, etc)do  not significantly interfere with this assay.   However, presence of multiple variants may affect accuracy.     09/25/2019 7.4 (H) 0.0 - 5.6 % Final     Comment:     Reference Interval:  5.0 - 5.6 Normal   5.7 - 6.4 High Risk   > 6.5 Diabetic    Hgb A1c results are standardized based on the (NGSP) National   Glycohemoglobin Standardization Program.    Hemoglobin A1C levels are related to mean serum/plasma glucose   during the preceding 2-3 months.        03/03/2019 7.1 (H) 0.0 - 5.6 % Final     Comment:     Reference Interval:  5.0 - 5.6 Normal   5.7 - 6.4 High Risk   > 6.5 Diabetic    Hgb A1c results are standardized based on the (NGSP) National   Glycohemoglobin Standardization Program.    Hemoglobin A1C levels are related to mean serum/plasma glucose   during the preceding 2-3 months.                   Passed - eGFR is 30 or above and within 360 days     eGFR if non    Date Value Ref Range Status   01/31/2020 >60.0 >60 mL/min/1.73 m^2 Final     Comment:     Calculation used to obtain the estimated glomerular filtration  rate (eGFR) is the CKD-EPI equation.      11/05/2019 >60.0 >60 mL/min/1.73 m^2 Final     Comment:     Calculation used to obtain the estimated glomerular filtration  rate (eGFR) is the CKD-EPI equation.      10/25/2019 >60 >60 mL/min/1.73 m^2 Final     Comment:     Calculation used to obtain the estimated glomerular filtration  rate (eGFR) is the CKD-EPI equation.        eGFR if    Date Value Ref Range Status   01/31/2020 >60.0 >60 mL/min/1.73 m^2 Final   11/05/2019 >60.0 >60 mL/min/1.73 m^2 Final   10/25/2019 >60 >60 mL/min/1.73 m^2 Final               Appointments  past 12m or future 3m with PCP    Date Provider   Last Visit   1/31/2020 Flaquita Mahajan MD   Next Visit   3/6/2020 Flaquita Mahajan MD   .  ED visits in past 90 days: 0       Note composed:10:50 AM 03/09/2020

## 2020-03-15 ENCOUNTER — PATIENT MESSAGE (OUTPATIENT)
Dept: FAMILY MEDICINE | Facility: CLINIC | Age: 70
End: 2020-03-15

## 2020-03-15 DIAGNOSIS — E11.49 TYPE 2 DIABETES MELLITUS WITH OTHER NEUROLOGIC COMPLICATION, WITHOUT LONG-TERM CURRENT USE OF INSULIN: ICD-10-CM

## 2020-03-15 RX ORDER — METFORMIN HYDROCHLORIDE 500 MG/1
TABLET, EXTENDED RELEASE ORAL
Qty: 90 TABLET | Refills: 3 | Status: SHIPPED | OUTPATIENT
Start: 2020-03-15

## 2020-03-23 ENCOUNTER — PATIENT MESSAGE (OUTPATIENT)
Dept: NEUROLOGY | Facility: CLINIC | Age: 70
End: 2020-03-23

## 2020-03-23 DIAGNOSIS — G57.10 MERALGIA PARESTHETICA, UNSPECIFIED LATERALITY: ICD-10-CM

## 2020-03-23 DIAGNOSIS — G57.10 MERALGIA PARESTHETICA, UNSPECIFIED LATERALITY: Primary | ICD-10-CM

## 2020-03-23 RX ORDER — GABAPENTIN 600 MG/1
600 TABLET ORAL 3 TIMES DAILY
Qty: 90 TABLET | Refills: 11 | Status: SHIPPED | OUTPATIENT
Start: 2020-03-23 | End: 2020-03-27 | Stop reason: SDUPTHER

## 2020-03-23 RX ORDER — GABAPENTIN 600 MG/1
600 TABLET ORAL 3 TIMES DAILY
Qty: 90 TABLET | Refills: 11 | Status: SHIPPED | OUTPATIENT
Start: 2020-03-23 | End: 2020-03-23 | Stop reason: SDUPTHER

## 2020-03-27 ENCOUNTER — PATIENT MESSAGE (OUTPATIENT)
Dept: NEUROLOGY | Facility: CLINIC | Age: 70
End: 2020-03-27

## 2020-03-27 DIAGNOSIS — G57.10 MERALGIA PARESTHETICA, UNSPECIFIED LATERALITY: ICD-10-CM

## 2020-03-27 RX ORDER — GABAPENTIN 600 MG/1
600 TABLET ORAL 3 TIMES DAILY
Qty: 270 TABLET | Refills: 3 | Status: SHIPPED | OUTPATIENT
Start: 2020-03-27 | End: 2020-06-09 | Stop reason: ALTCHOICE

## 2020-03-30 ENCOUNTER — PATIENT MESSAGE (OUTPATIENT)
Dept: NEUROLOGY | Facility: CLINIC | Age: 70
End: 2020-03-30

## 2020-04-01 ENCOUNTER — TELEPHONE (OUTPATIENT)
Dept: NEUROLOGY | Facility: CLINIC | Age: 70
End: 2020-04-01

## 2020-04-01 NOTE — TELEPHONE ENCOUNTER
Spoke with [facility]. Received a letter asking about patient taking lyrica and gabapentin. Informed them that at this time patient is only taking gabapentin.

## 2020-04-06 ENCOUNTER — PATIENT MESSAGE (OUTPATIENT)
Dept: NEUROLOGY | Facility: CLINIC | Age: 70
End: 2020-04-06

## 2020-04-15 ENCOUNTER — PATIENT OUTREACH (OUTPATIENT)
Dept: ADMINISTRATIVE | Facility: OTHER | Age: 70
End: 2020-04-15

## 2020-04-15 ENCOUNTER — TELEPHONE (OUTPATIENT)
Dept: NEUROLOGY | Facility: CLINIC | Age: 70
End: 2020-04-15

## 2020-04-16 ENCOUNTER — PATIENT MESSAGE (OUTPATIENT)
Dept: NEUROLOGY | Facility: CLINIC | Age: 70
End: 2020-04-16

## 2020-04-16 ENCOUNTER — OFFICE VISIT (OUTPATIENT)
Dept: NEUROLOGY | Facility: CLINIC | Age: 70
End: 2020-04-16
Payer: MEDICARE

## 2020-04-16 DIAGNOSIS — G57.11 MERALGIA PARAESTHETICA, RIGHT: ICD-10-CM

## 2020-04-16 DIAGNOSIS — G90.522 COMPLEX REGIONAL PAIN SYNDROME TYPE 1 OF LEFT LOWER EXTREMITY: ICD-10-CM

## 2020-04-16 DIAGNOSIS — E11.42 DIABETIC POLYNEUROPATHY ASSOCIATED WITH TYPE 2 DIABETES MELLITUS: ICD-10-CM

## 2020-04-16 DIAGNOSIS — R29.898 LEFT LEG WEAKNESS: ICD-10-CM

## 2020-04-16 DIAGNOSIS — M48.062 LUMBAR STENOSIS WITH NEUROGENIC CLAUDICATION: Primary | ICD-10-CM

## 2020-04-16 DIAGNOSIS — G03.9 ARACHNOIDITIS: ICD-10-CM

## 2020-04-16 DIAGNOSIS — Z96.89 SPINAL CORD STIMULATOR STATUS: ICD-10-CM

## 2020-04-16 PROCEDURE — 1101F PT FALLS ASSESS-DOCD LE1/YR: CPT | Mod: CPTII,95,, | Performed by: PSYCHIATRY & NEUROLOGY

## 2020-04-16 PROCEDURE — 1159F MED LIST DOCD IN RCRD: CPT | Mod: 95,,, | Performed by: PSYCHIATRY & NEUROLOGY

## 2020-04-16 PROCEDURE — 99214 PR OFFICE/OUTPT VISIT, EST, LEVL IV, 30-39 MIN: ICD-10-PCS | Mod: 95,,, | Performed by: PSYCHIATRY & NEUROLOGY

## 2020-04-16 PROCEDURE — 99214 OFFICE O/P EST MOD 30 MIN: CPT | Mod: 95,,, | Performed by: PSYCHIATRY & NEUROLOGY

## 2020-04-16 PROCEDURE — 1101F PR PT FALLS ASSESS DOC 0-1 FALLS W/OUT INJ PAST YR: ICD-10-PCS | Mod: CPTII,95,, | Performed by: PSYCHIATRY & NEUROLOGY

## 2020-04-16 PROCEDURE — 99499 RISK ADDL DX/OHS AUDIT: ICD-10-PCS | Mod: 95,,, | Performed by: PSYCHIATRY & NEUROLOGY

## 2020-04-16 PROCEDURE — 3044F PR MOST RECENT HEMOGLOBIN A1C LEVEL <7.0%: ICD-10-PCS | Mod: CPTII,95,, | Performed by: PSYCHIATRY & NEUROLOGY

## 2020-04-16 PROCEDURE — 1159F PR MEDICATION LIST DOCUMENTED IN MEDICAL RECORD: ICD-10-PCS | Mod: 95,,, | Performed by: PSYCHIATRY & NEUROLOGY

## 2020-04-16 PROCEDURE — 99499 UNLISTED E&M SERVICE: CPT | Mod: 95,,, | Performed by: PSYCHIATRY & NEUROLOGY

## 2020-04-16 PROCEDURE — 3044F HG A1C LEVEL LT 7.0%: CPT | Mod: CPTII,95,, | Performed by: PSYCHIATRY & NEUROLOGY

## 2020-04-16 RX ORDER — DULOXETIN HYDROCHLORIDE 60 MG/1
60 CAPSULE, DELAYED RELEASE ORAL DAILY
Qty: 90 CAPSULE | Refills: 3 | Status: SHIPPED | OUTPATIENT
Start: 2020-04-16

## 2020-04-16 RX ORDER — PREGABALIN 200 MG/1
200 CAPSULE ORAL 3 TIMES DAILY
Qty: 90 CAPSULE | Refills: 5 | Status: SHIPPED | OUTPATIENT
Start: 2020-04-16

## 2020-04-16 NOTE — PATIENT INSTRUCTIONS
Please call our clinic at [phone] or send a message to Dr. Jean on the [employer] portal if there are any changes to the plan described below, for example,if you are not contacted for the requested tests, referral(s) within one week, if you are unable to receive the medications prescribed, or if you feel you need to change the treatment course for any reason.     TESTING:  -- none     REFERRALS:  -- none     PREVENTION OF PAIN:   -- continue trileptal 600mg twice a day   -- let's try a different pharmacy to switch the gabapentin 600mg three times per day to the generic Lyrica 200mg three times per day   -- STOP citalopram (Celexa) antidepressant without pain function)  -- DOUBLE duloxetine 30mg daily to 60mg daily (using the tablets you have, then when out fill new 60mg tab script) - antidepressant WITH pain function    AS-NEEDED TREATMENT OF PAIN:  -- continue tylenol 1000mg three times per day as needed   -- please start using Aspercreme (over the counter) 3 times a day on the right thigh

## 2020-04-16 NOTE — PROGRESS NOTES
Date of service: 2020  Referring provider: No ref. provider found    Subjective:      Chief complaint: Follow-up       Patient ID: Domingo Vaca is a 69 y.o. gentleman with lumbar arachnoiditis, peripheral artery disease, type 2 diabetes, history of cervical fusion, history of lumbar fusion, history of left ankle fusion, CRPS of left lower extremity who is presenting for follow up of pain     History of Present Illness    INTERVAL HISTORY - 2020    The patient location is: home   The chief complaint leading to consultation is: pain   Visit type: audiovisual  Total time spent with patient: 15 min   Each patient to whom he or she provides medical services by telemedicine is:  (1) informed of the relationship between the physician and patient and the respective role of any other health care provider with respect to management of the patient; and (2) notified that he or she may decline to receive medical services by telemedicine and may withdraw from such care at any time.    At the last visit 3 months ago he was doing poorly. I referred him to aquatherapy ([facility]). In the interim he had problems getting the Lyrica covered so we changed back to gabaepentin 600mg TID. He was previously using the [employer] patient assistance program for the Lyrica but when that , he reports some conditions of the program changed. It was not affordable through [facility] but he may be able to get the generic from [facility]. The Trleptal is more affordable for retail price through [facility]. The duloxetine is free at [facility].    Today he reports he is much worse. He has had to lay in bed flat for last 5 weeks as he has had nerve pain in the right thigh from the groin to the knee. Only thing that relieves it is to lay flat. If he sits up it is very painful (9/10). It is even worse if he sits on the commode (10/10). This pain is associated with numbness. He reports the strength in the right leg is the same.    He is taking  "trileptal 600mg BID. He tried taking an extra 300mg mid-day that did not help and caused him some vision changes. Since lowering the dose to 600mg BID that vision change resolved. It is still working very well for his arachnoiditis pain.    He has been on the gabapentin for 4 weeks. It is not helping at all. He is taking 600mg TID. He agreeable to trying Lyrica again.     He reports he has been on celexa for 8 or more years and that was for "heart health" and he does not have depression or anxiety.      INTERVAL HISTORY - 1/31/2020     Mr. Caroline Hand was last seen 3 months ago.  At that point we had him try various Trileptal doses.  He reports that at 900 mg twice a day his vision was blurred so he went back to 600 twice a day which resolved the blurred vision and it does provide significant relief of the pain.    Today he reports he has little worse.  Specifically there is increasing weakness, leg pain, and numbness in his legs. He has been relying more on his power wheelchair.  He can only take a few steps before he has pain. He feels that the left leg is almost useless.  Whereas previously he was numb in the thigh he is now numb below the knee into the calf region.  The right leg is hurting more, not a shock-like pain but a deep pain. The Trileptal 600 b.i.d. does provided significant relief to the neuralgic pain. He fell 5 times in the last 6 weeks due to his legs giving way.    He feels that his memory has been poor and his concentration has been poor.    Recently he has had some difficulty swallowing, as of the last 3 months, he could not have an endoscopy given that he was recently placed on Plavix after he had a stent, thus he had an EGD which showed pooling in the vallecula but no other abnormalities.  It was suggested to him that may be this has something to do with the anterior fusion.    INTERVAL HISTORY - 10/30/19     The last visit was 1 month ago at which point I started Trileptal.  In the interim he " was admitted to [facility] from 9/26/19 to 10/1/19 for acute respiratory early failure, aspiration pneumonia, over narcosis, placement of drug eluting stent.  During the hospitalization his opiate regimen was changed.  Afterwards he message me that he was out of narcotics, they had not been helping anyway, we mutually decided to stop them.  He has been off opiates for 1 month and has been doing very well.  He reports there was no worsening of pain for doing so.  In the hospital the Trileptal was raise to 600 b.i.d. any reports it is helping 20% with his neuralgic pain down the legs. He is tolerating it without any side effects. He reports he does not think the Lyrica 200 mg t.i.d. is helping.    INTERVAL HISTORY - 9/24/19     He was last seen in the clinic approximately 5-6 months ago.     His chronic pain history is complicated, and in my opinion is representation of lumbar stenosis with neurogenic claudication, arachnoiditis, complex regional pain syndrome, diabetic polyneuropathy, facet arthropathy.  I made several recommendations for treating arachnoiditis pain. He was following with Dr. Lazaro Orantes.    In the interim, Dr. Orantes tried to rotate to Nucynta which was denied by his insurance.    He is currently on Lyrica 200 mg t.i.d..  In August Em Orantes raised his oxycodone to 15 mg 4 times a day.  He has not had any further procedures.    Today he is much worse. He reports his pain is progressing. He is unable to walk, using wheelchair for this visit. At home he ambulates short distances with extreme pain, he reports Dr. Iqbal is trying to order a power wheelchair for this purpose.  Otherwise he has a walker.    He is in pain 100% of the time.  4-5 times per day he will have severe, sudden, episodes of stabbing and burning from the buttocks to the toes in the right leg, and the knee to the toes in the left leg.  The right buttocks is constantly sore.  Afterwards there is a remaining soreness in the legs.    The  oxycodone 15mg is not helping at all, even when he takes 2-3 in a span of a couple hours.     He has developed mild urinary incontinence.    His current pain score is 10.       ORIGINAL PAIN HISTORY - 4/4/19   Age at onset and course over time:  He has a long history of chronic low back pain due to flat back, congenitally narrow canal. He had a previous L4-S1 PSIF with Dr Grimes in 2014 followed by SCS placement with Dr Navarro at [facility] in 2015. He has had 7 surgeries total. He had numerous epidural steroids in the past with long-term failure and he was only getting modest relief after the spinal cord stimulator.  Prior to surgery the pain was in the low back, radiating down his thighs into the calves bilaterally worse on the right.  He had no weakness or urinary symptoms.  Pain was interfering with his sleep, sitting, ambulation.    The pain became worse in late 2017. He had a posterior fusion at L2-S1 and the L2-4 decompression with Dr. Camp on 03/06/2018.  Post-op there was immediate left leg weakness and numbness. On exam in the hospital, there was absence of left patellar reflex and weak left knee extension more than left hip flexion. I thought this was a post-op femoral neuropathy from abdominal compression during surgery. Per Dr. Camp's note, there was intra-op left L4 nerve root injury due to scar tissue dissection.    In May 2018 the right leg pain returned suddenly. He had a NCS/EMG 5/24/18 with Dr. Joyner which showed severe incomplete L4 radiculopathy and bilateral chronic S1 radiculopathy.     He did have bilateral stents in the lower extremities in 2016 so a CTA was done to check the patency.  The stents were patent.  There was residual peripheral arterial occlusive disease in the right common femoral artery and the right popliteal artery.  The left superficial femoral artery was occluded. Dr. Arroyo felt that this anatomically could not explain his pain.      Within the last year his morphine went  from 30mg daily to 60mg daily. This did nothing. He consulted with Dr. Lazaro Orantes and had an intrathecal morphine trial. This was ineffective. Then tried dilaudid intrathecally without effect, I do not know the doses.      Left leg is numb from hip to knee. There is a burning pain in the left knee. He reports he can handle this, the problem is his right leg which has excruciating pain from the hip to the ankle. Muscles feel on fire. He had this before the surgery but now it feels more muscular. He reports that before, his muscles were not giving out on him.     He was admitted to [facility] with acute upper GI bleed causing hypovolemic and possible septic shock. Needed pressors and blood products. He had a GI ulcer.     Back pain is manageable with the stimulator.  The stimulator does also cover the left lower extremity for the CRPS.    Location:  Right buttocks, down the hamstring, into the calf and lateral ankle  Radiation:    Quality: numb burning type pain feels like a constant patricia horse pain   Severity: 9  Duration: constant with escalations after 5-10 min of walking   Frequency: daily   Alleviated by: laying down  Exacerbated by: sitting is better than walking, but both hurt. He has to sit down for a while to make it go away.   Associated with: right leg buckling; no right leg numbness, no swelling, no temperature change, no bowel or bladder  Sleep habits:    Current acute treatment:  Tylenol 1000mg TID   (plavix)    Current prevention:  Trileptal 600 b.i.d.  Gabapentin 600mg TID - no relief   duloxetine 30mg daily - started 3/28/19   (cellexa)    Previously tried:   Oxycodone 15 mg #120 per month  -- levorphanol 2mg - no response   -- morphine ER 30mg BID   -- gabapentin 1200mg TID - ineffective  -- percocet 10mg/325mg - 1.5 - 2 hours of relief only; takes 3 to 4 per day   Lyrica 200 mg 3 times per day - effective but cost prohibitive     Procedural history:       Review of patient's allergies indicates:    Allergen Reactions    Glipizide Rash     Current Outpatient Medications   Medication Sig Dispense Refill    acetaminophen (TYLENOL) 500 MG tablet Take 1,000 mg by mouth every 6 (six) hours as needed for Pain.      amLODIPine (NORVASC) 5 MG tablet Take 1 tablet (5 mg total) by mouth every evening. 90 tablet 3    clopidogrel (PLAVIX) 75 mg tablet Take 1 tablet (75 mg total) by mouth once daily. 90 tablet 3    DULoxetine (CYMBALTA) 60 MG capsule Take 1 capsule (60 mg total) by mouth once daily. 90 capsule 3    gabapentin (NEURONTIN) 600 MG tablet Take 1 tablet (600 mg total) by mouth 3 (three) times daily. 270 tablet 3    metFORMIN (GLUCOPHAGE-XR) 500 MG XR 24hr tablet Take one tablets once daily with breakfast. 90 tablet 3    metoprolol tartrate (LOPRESSOR) 25 MG tablet Take 1 tablet (25 mg total) by mouth 2 (two) times daily. 180 tablet 1    olmesartan (BENICAR) 40 MG tablet Take 1 tablet (40 mg total) by mouth once daily. 90 tablet 3    OXcarbazepine (TRILEPTAL) 300 MG Tab Take 2 tablets (600 mg total) by mouth 2 (two) times daily. 120 tablet 3    pregabalin (LYRICA) 200 MG Cap Take 1 capsule (200 mg total) by mouth 3 (three) times daily. 90 capsule 5    simvastatin (ZOCOR) 40 MG tablet Take 1 tablet (40 mg total) by mouth every evening. 90 tablet 3    tamsulosin (FLOMAX) 0.4 mg Cap Take 1 capsule (0.4 mg total) by mouth once daily. 90 capsule 1    aspirin (ECOTRIN) 81 MG EC tablet Take 1 tablet (81 mg total) by mouth once daily. for 14 days (Patient taking differently: Take 81 mg by mouth every evening. ) 14 tablet 0    predniSONE (DELTASONE) 20 MG tablet Take 3 pills po qam with breakfast x 1 wk then take 2 po qam with breakfast x 1 wk then take 1 po qam with breakfast x 1 wk (Patient not taking: Reported on 4/16/2020) 42 tablet 0    triamcinolone acetonide 0.1% (KENALOG) 0.1 % ointment Apply topically 2 (two) times daily. (Patient not taking: Reported on 4/16/2020) 453.6 g 1     No current  facility-administered medications for this visit.        Past Medical History  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    Anticoagulant long-term use     Arthritis     Cervical stenosis of spine     Chronic cervical pain     Chronic lumbar pain     Claudication of both lower extremities     Coronary artery disease     1 stent     Disorder of kidney and ureter     History of right common carotid artery stent placement     HTN (hypertension)     Hyperlipidemia     Lumbar facet arthropathy 02/2018    Lumbar stenosis with neurogenic claudication 02/2018    Narcotic dependence 9/25/2019    Obesity     PVD (peripheral vascular disease)     stents 1 left; 2 right    Respiratory failure 9/25/2019    RSD lower limb     Left ankle    S/P insertion of spinal cord stimulator     not working    S/P lumbar spinal fusion     multiple       Past Surgical History  Past Surgical History:   Procedure Laterality Date    ABDOMINAL AORTOGRAPHY N/A 9/30/2019    Procedure: Aortogram, Abdominal;  Surgeon: Kael Woo MD;  Location: [facility] CATH;  Service: Cardiology;  Laterality: N/A;    ANGIOGRAM, CORONARY, WITH LEFT HEART CATHETERIZATION  11/5/2019    Procedure: Angiogram, Coronary, with Left Heart Cath;  Surgeon: Rivera Soto MD;  Location[facility] C[facility] LAB;  Service: Cardiology;;    ANGIOPLASTY      Aortagram with Runoff; stent leg    ANKLE SURGERY Left     Multiple surgeries; now fused    APPENDECTOMY      BACK SURGERY      7 total    BRONCHOSCOPY Right 3/19/2019    Procedure: Bronchoscopy;  Surgeon: Antony Coelho Jr., DO;  Location: [facility] ENDO;  Service: Pulmonary;  Laterality: Right;    CAROTID STENT Left     CARPAL TUNNEL RELEASE Bilateral     left twice; right once    CERVICAL FUSION      over 5 levels    COLONOSCOPY  ~2015    normal findings per patient report    CORONARY ANGIOGRAPHY N/A  9/27/2019    Procedure: ANGIOGRAM, CORONARY ARTERY;  Surgeon: Michael Ruiz MD;  Location: [facility] CATH;  Service: Cardiology;  Laterality: N/A;    CORONARY STENT PLACEMENT N/A 11/5/2019    Procedure: INSERTION, STENT, CORONARY ARTERY;  Surgeon: Rivera Soto MD;  Location: [facility] CATH LAB;  Service: Cardiology;  Laterality: N/A;    ESOPHAGOGASTRODUODENOSCOPY N/A 3/16/2019    Procedure: EGD (ESOPHAGOGASTRODUODENOSCOPY)-in icu;  Surgeon: Angel Mckeon MD;  Location: [facility] ENDO;  Service: Endoscopy;  Laterality: N/A;  A large amount of food (residue) in the stomach, esophageal ulcer    ESOPHAGOGASTRODUODENOSCOPY Left 3/18/2019    Procedure: EGD (ESOPHAGOGASTRODUODENOSCOPY)- in ICU on drip;  Surgeon: Angel Mckeon MD;  Location: [facility] ENDO;  Service: Endoscopy;  Laterality: Left; esophageal ulcer, Erythematous mucosa was found in the gastric body, consistent with NG tube trauma    LEFT HEART CATHETERIZATION Left 9/27/2019    Procedure: Left heart cath;  Surgeon: Michael Ruiz MD;  Location: [facility] CATH;  Service: Cardiology;  Laterality: Left;    LEFT HEART CATHETERIZATION Right 9/30/2019    Procedure: Left heart cath;  Surgeon: Kael Woo MD;  Location: [facility] CATH;  Service: Cardiology;  Laterality: Right;    pain injection      Multiple    PERCUTANEOUS TRANSLUMINAL BALLOON ANGIOPLASTY OF CORONARY ARTERY  9/30/2019    Procedure: Angioplasty-coronary;  Surgeon: Kael Woo MD;  Location: [facility] CATH;  Service: Cardiology;;    POSTERIOR FUSION LUMBAR SPINE W/ CORPECTOMY      twice in last 18 mos (3/2016)    SPINAL CORD STIMULATOR IMPLANT  implanted 12/8/15    having great pain relief    SYMPATHECTOMY         Family History  Family History   Problem Relation Age of Onset    No Known Problems Mother     Heart disease Father     Early death Father 56        MI    Glaucoma Neg Hx     Colon cancer Neg Hx     Colon polyps Neg Hx     Crohn's disease Neg Hx     Esophageal  cancer Neg Hx     Stomach cancer Neg Hx     Ulcerative colitis Neg Hx        Social History  Social History     Socioeconomic History    Marital status:      Spouse name: Not on file    Number of children: Not on file    Years of education: Not on file    Highest education level: Not on file   Occupational History    Not on file   Social Needs    Financial resource strain: Somewhat hard    Food insecurity:     Worry: Often true     Inability: Often true    Transportation needs:     Medical: Yes     Non-medical: Yes   Tobacco Use    Smoking status: Former Smoker     Packs/day: 2.00     Years: 45.00     Pack years: 90.00     Types: Cigarettes     Last attempt to quit: 2009     Years since quittin.2    Smokeless tobacco: Never Used    Tobacco comment: vapes with nicotine since    Substance and Sexual Activity    Alcohol use: No     Frequency: Never     Binge frequency: Never    Drug use: No    Sexual activity: Not Currently     Partners: Female   Lifestyle    Physical activity:     Days per week: 0 days     Minutes per session: 0 min    Stress: To some extent   Relationships    Social connections:     Talks on phone: Once a week     Gets together: Never     Attends Mandaeism service: Not on file     Active member of club or organization: No     Attends meetings of clubs or organizations: Never     Relationship status:    Other Topics Concern    Not on file   Social History Narrative    Not on file        Review of Systems  14-point review of systems as follows:   No check yomi indicates NEGATIVE response   Constitutional: [] weight loss, [] change to appetite   Eyes: [x] change in vision, [] double vision   Ears, nose, mouth, throat: [] frequent nose bleeds, [] ringing in the ears   Respiratory: [] cough, [] wheezing   Cardiovascular: [] chest pain, [] palpitations   Gastrointestinal: [] jaundice, [] nausea/vomiting   Genitourinary: [] incontinence, [] burning with urination    Hematologic/lymphatic: [] easy bruising/bleeding, [] night sweats   Neurological: [x] numbness, [x] weakness   Endocrine: [] fatigue, [] heat/cold intolerance   Allergy/Immunologic: [] fevers, [] chills   Musculoskeletal: [x] muscle pain, [x] joint pain   Psychiatric: [] thoughts of harming self/others, [] depression   Integumentary: [] rashes, [] sores that do not heal     Objective:        There were no vitals filed for this visit.  There is no height or weight on file to calculate BMI.       4/4/19   Constitutional: appears in no acute distress, well-developed, well-nourished     Eyes: normal conjunctiva, PERRLA     Ears, nose, mouth, throat: external appearance of ears and nose normal, hearing intact     Cardiovascular: regular rate and rhythm, no murmurs appreciated    Respiratory: unlabored respirations, breath sounds normal bilaterally    Gastrointestinal: no visible abdominal masses, no guarding, no visible hernia    Musculoskeletal:   All muscle groups of the upper extremities are 5/5  The right leg is without atrophy  The left leg shows muscle atrophy in the quads and below the knee  The left leg is with mottling below the knee, slight rubor in the foot, mild swelling of the foot, several healed ulcers, portions of the left leg or cooler than the right.  This is his baseline CRPS.  With full effort he does demonstrate short durations of full strength in all lower extremity muscle groups bilaterally except for the left ankle which is surgically fused  His gait is antalgic    Psychiatric: normal judgment and insight. Oriented to person, place, and time.     Neurologic:   Cortical functions: recent and remote memory intact, normal attention span and concentration, speech fluent, adequate fund of knowledge   Cranial nerves: EOMI, symmetric facial strength  Reflexes: 2+ in the upper and lower extremities, no Rojo  Sensation:    -- There is reduced sensation to temperature in the left C7 dermatome  -- there  is loss of light touch, temperature, pinprick sensation in the entire left lower extremity without a dermatomal preference, proprioception and vibration are absent on left  -- there is preservation of light touch, temperature, pinprick in the entire right leg except for the dorsum of the foot.  Vibration is slightly reduced at the right toe, proprioception intact on the right  Coordination: normal    Data Review:     I have personally reviewed the referring provider's notes, labs, & imaging made available to me today.     RADIOLOGY STUDIES:  I have personally reviewed the pertinent images performed.     Results for orders placed or performed during the hospital encounter of 03/16/19   CT Head Without Contrast    Narrative    EXAMINATION:  CT HEAD WITHOUT CONTRAST    CPT: 16586    CLINICAL HISTORY:  Confusion/delirium, altered LOC, unexplained;Syncope/fainting;.    TECHNIQUE:  Axial CT slices through the head were obtained without the administration of contrast.  Sagittal and coronal reconstructions were performed.  Automated exposure control was utilized.  Total DLP is approximately 674 mGy cm.    COMPARISON:  None available.    FINDINGS:  Mild-to-moderate generalized involutional changes are seen.  No evidence of acute intracranial hemorrhage, mass effect, midline deviation, hydrocephalus, or abnormal extra-axial fluid collection is visualized.  There appears to be  periventricular and deep white matter the hypoattenuation which is nonspecific, but is most commonly associated with chronic microvascular ischemic changes.  No evidence of acute large vessel territory ischemia/infarction is appreciated.  MRI with diffusion-weighted imaging is more sensitive in the assessment of acute ischemia/infarction.  The basilar cisterns are preserved. Minimal mucosal thickening in the lateral left maxillary sinus is seen.  The mastoid air cells appear to be grossly clear.  No acute displaced calvarial fracture is visualized.       Impression    1. No acute intracranial abnormality is visualized.      Electronically signed by: Ruslan Crowe MD  Date:    03/17/2019  Time:    12:31 04/5/2018 x-ray lumbar spine  Pedicle screws with janice fixation L2-L3-L4-L5 S1  Disc prosthesis L4-5 and L5-S1  Anterior listhesis of L2 on L3  Retrolisthesis of L3 on L4  Prior laminectomy  Facet arthropathy at multiple levels  Dorsal column stimulator at T9  No hardware complication    03/10/2018 myelogram with CT lumbar spine  -- soft tissue/longitudinal ligament thickening along the posterior vertebral body contours and extending caudally from L1 to the sacral elements.    -- This produces significant multilevel moderate to severe constriction of the thecal sac with noted crowding of the cauda equina.  These changes are most significant at L1-2 through L3-4.    At L3-4, there is a large right asymmetric focal disc protrusion resulting in posterior and leftward displacement of the thecal sac and its contents.  Additionally, there is redemonstrated metallic structure adjacent to the left thecal sac contour in the region of the lateral recess at L4-5; this is unchanged in comparison to the prior.    Stable postoperative and extensive degenerative changes of the lumbar spine.  No evidence of organized fluid collection, acute spinal column disruption, or other significant interval alteration is appreciated.    Lab Results   Component Value Date     (L) 01/31/2020    K 5.2 (H) 01/31/2020    MG 2.5 03/16/2019    CL 95 01/31/2020    CO2 27 01/31/2020    BUN 9 01/31/2020    CREATININE 1.0 01/31/2020     (H) 01/31/2020    HGBA1C 6.9 (H) 01/31/2020    AST 16 12/17/2019    ALT 17 12/17/2019    ALBUMIN 4.0 12/17/2019    PROT 7.3 12/17/2019    BILITOT 0.3 12/17/2019    CHOL 133 01/31/2020    HDL 48 01/31/2020    LDLCALC 66.4 01/31/2020    TRIG 93 01/31/2020       Lab Results   Component Value Date    WBC 5.64 01/31/2020    HGB 12.4 (L) 01/31/2020    HCT 39.4 (L)  01/31/2020    MCV 84 01/31/2020     01/31/2020       Lab Results   Component Value Date    TSH 0.497 09/25/2019       Assessment & Plan:       Problem List Items Addressed This Visit        Neuro    Lumbar stenosis with neurogenic claudication - Primary    Overview     Status post multiple epidural steroid injections and seven back surgeries, most recent L2-S1 fusion 3/6/18          Complex regional pain syndrome type 1 of left lower extremity    Diabetic polyneuropathy associated with type 2 diabetes mellitus    Overview     No large fiber neuropathy on NCS 5/24/18 but patient with sensory loss in the feet, most likely a diabetic small fiber neuropathy         Relevant Medications    pregabalin (LYRICA) 200 MG Cap    DULoxetine (CYMBALTA) 60 MG capsule    Spinal cord stimulator status    Overview     Controlling his back pain and left leg CRPS pain         Meralgia paraesthetica, right    Overview     Gabapentin ineffective go back on lyrica            ID    Arachnoiditis    Overview     Patient most likely has lumbar he has of arachnoiditis after multiple lumbar procedures in 7 surgeries  Most likely the cause of his severe, progressive, right lower extremity pain refractory to surgery (in fact worse after surgery)    At initial consult we had discussed - use of neuro modulating opiate like Nucynta or methadone (nucynta was denied by insurance), lumbar sympathetic blocks, neuropathic intrathecal agents (bupivacaine, clonidine, Prialt)    His pain is showing opiate refractory qualities thus I do not think rotation to methadone is appropriate this point.  Rather, his worst pain is paroxysmal lumbar neuralgia, most appropriate medication would be Trileptal as this has excellent neuralgia coverage            Orthopedic    Left leg weakness    Overview     Due to L4 nerve root injury during scar tissue dissection, improving.                    Please call our clinic at [phone] or send a message to Dr. Jean  on the [employer] portal if there are any changes to the plan described below, for example,if you are not contacted for the requested tests, referral(s) within one week, if you are unable to receive the medications prescribed, or if you feel you need to change the treatment course for any reason.     TESTING:  -- none     REFERRALS:  -- none     PREVENTION OF PAIN:   -- continue trileptal 600mg twice a day   -- let's try a different pharmacy to switch the gabapentin 600mg three times per day to the generic Lyrica 200mg three times per day   -- STOP citalopram (Celexa) antidepressant without pain function)  -- DOUBLE duloxetine 30mg daily to 60mg daily (using the tablets you have, then when out fill new 60mg tab script) - antidepressant WITH pain function    AS-NEEDED TREATMENT OF PAIN:  -- continue tylenol 1000mg three times per day as needed   -- please start using Aspercreme (over the counter) 3 times a day on the right thigh     Follow up in about 2 months (around 6/16/2020).      Marilia Jean MD

## 2020-04-17 ENCOUNTER — TELEPHONE (OUTPATIENT)
Dept: NEUROLOGY | Facility: CLINIC | Age: 70
End: 2020-04-17

## 2020-05-01 ENCOUNTER — OFFICE VISIT (OUTPATIENT)
Dept: FAMILY MEDICINE | Facility: CLINIC | Age: 70
End: 2020-05-01
Payer: MEDICARE

## 2020-05-01 DIAGNOSIS — R29.898 WEAKNESS OF BOTH LEGS: ICD-10-CM

## 2020-05-01 DIAGNOSIS — E78.2 MIXED HYPERLIPIDEMIA: ICD-10-CM

## 2020-05-01 DIAGNOSIS — R29.6 MULTIPLE FALLS: ICD-10-CM

## 2020-05-01 DIAGNOSIS — E11.42 DIABETIC POLYNEUROPATHY ASSOCIATED WITH TYPE 2 DIABETES MELLITUS: Primary | ICD-10-CM

## 2020-05-01 DIAGNOSIS — R20.0 NUMBNESS: ICD-10-CM

## 2020-05-01 PROCEDURE — 99214 PR OFFICE/OUTPT VISIT, EST, LEVL IV, 30-39 MIN: ICD-10-PCS | Mod: 95,,, | Performed by: FAMILY MEDICINE

## 2020-05-01 PROCEDURE — 1159F PR MEDICATION LIST DOCUMENTED IN MEDICAL RECORD: ICD-10-PCS | Mod: 95,,, | Performed by: FAMILY MEDICINE

## 2020-05-01 PROCEDURE — 3044F PR MOST RECENT HEMOGLOBIN A1C LEVEL <7.0%: ICD-10-PCS | Mod: CPTII,95,, | Performed by: FAMILY MEDICINE

## 2020-05-01 PROCEDURE — 1159F MED LIST DOCD IN RCRD: CPT | Mod: 95,,, | Performed by: FAMILY MEDICINE

## 2020-05-01 PROCEDURE — 99214 OFFICE O/P EST MOD 30 MIN: CPT | Mod: 95,,, | Performed by: FAMILY MEDICINE

## 2020-05-01 PROCEDURE — 1101F PR PT FALLS ASSESS DOC 0-1 FALLS W/OUT INJ PAST YR: ICD-10-PCS | Mod: CPTII,95,, | Performed by: FAMILY MEDICINE

## 2020-05-01 PROCEDURE — 1101F PT FALLS ASSESS-DOCD LE1/YR: CPT | Mod: CPTII,95,, | Performed by: FAMILY MEDICINE

## 2020-05-01 PROCEDURE — 3044F HG A1C LEVEL LT 7.0%: CPT | Mod: CPTII,95,, | Performed by: FAMILY MEDICINE

## 2020-05-01 NOTE — PROGRESS NOTES
Subjective:       Patient ID: Domingo Vaca is a 69 y.o. male.    Chief Complaint: Fall    HPI     The patient location is:  [place]  The chief complaint leading to consultation is: Follow-up on diabetes, falls  Visit type:   audiovisual  Total time spent with patient:  20 min  Each patient to whom he or she provides medical services by telemedicine is:  (1) informed of the relationship between the physician and patient and the respective role of any other health care provider with respect to management of the patient; and (2) notified that he or she may decline to receive medical services by telemedicine and may withdraw from such care at any time.    Notes:     Diabetes: the last hemoglobin A1c was therapeutic, the patient has been taking his medications as directed, denies any side effects of the medication.  The patient complains of numbness sensation on both hips, but he was diagnosed with arc no diabetes and is currently under the care of the neurologist for this problem.  He denies any symptoms of chest pain or worsening shortness of breath.    Hyperlipidemia:  The last cholesterol levels were therapeutic, the patient is currently taking cholesterol medicine as directed.  Simvastatin 40 mg at bedtime.    Multiple falls:  The patient was diagnosed with arachnoiditis and is having numbness and weakness on bilateral lower extremities.  The patient was discontinue Celexa and started on gabapentin 600 mg twice daily and Lyrica 200 mg.  The patient stated that since then is feeling better and having less pain and discomfort on the hips but still having some issues.  Per patient the neurologist recommended against physical therapy to the patient having adenoiditis and the therapy could exacerbate the pain.    Past medical history, past social history was reviewed and discussed with the patient.    Review of Systems   Constitutional: Positive for activity change. Negative for unexpected weight change.   HENT:  Negative for hearing loss, rhinorrhea and trouble swallowing.    Eyes: Positive for visual disturbance. Negative for discharge.   Respiratory: Negative for chest tightness and wheezing.    Cardiovascular: Negative for chest pain and palpitations.   Gastrointestinal: Negative for blood in stool, constipation, diarrhea and vomiting.   Endocrine: Negative for polydipsia and polyuria.   Genitourinary: Negative for difficulty urinating, hematuria and urgency.   Musculoskeletal: Positive for arthralgias and joint swelling. Negative for neck pain.   Neurological: Positive for weakness. Negative for headaches.   Psychiatric/Behavioral: Negative for confusion and dysphoric mood.       Objective:      Physical Exam   Constitutional: He appears well-developed and well-nourished. No distress.   HENT:   Head: Normocephalic and atraumatic.   Right Ear: External ear normal.   Left Ear: External ear normal.   Skin: He is not diaphoretic.   Psychiatric: He has a normal mood and affect. His behavior is normal. Judgment and thought content normal.       Assessment:       1. Diabetic polyneuropathy associated with type 2 diabetes mellitus    2. Essential hypertension    3. Mixed hyperlipidemia    4. Weakness of both legs    5. Numbness    6. Multiple falls        Plan:       Diabetic polyneuropathy associated with type 2 diabetes mellitus:  Well controlled  -     CBC auto differential; Future; Expected date: 05/01/2020  -     Comprehensive metabolic panel; Future; Expected date: 05/01/2020  -     Hemoglobin A1c; Future; Expected date: 05/01/2020    Mixed hyperlipidemia:  Stable    Weakness of both legs:  Improved    Numbness:  Improved  -     Vitamin B12; Future; Expected date: 05/01/2020    Multiple falls:  Improved    Symptoms are slightly improved seen the patient is been taking Lyrica and gabapentin.  The patient currently seen the neurologist.  He was recommended physical therapy but after discussion with the neurologist, he  prefers to wait on PT secondary that the patient can have more pain due to the arachnoiditis.  Will call the patient after we have the results of the test.  Patient agreed with assessment and plan. Patient verbalized understanding.

## 2020-05-01 NOTE — PATIENT INSTRUCTIONS
Eating Heart-Healthy Food: Using the DASH Plan    Eating for your heart doesnt have to be hard or boring. You just need to know how to make healthier choices. The DASH eating plan has been developed to help you do just that. DASH stands for Dietary Approaches to Stop Hypertension. It is a plan that has been proven to be healthier for your heart and to lower your risk for high blood pressure. It can also help lower your risk for cancer, heart disease, osteoporosis, and diabetes.  Choosing from each food group  Choose foods from each of the food groups below each day. Try to get the recommended number of servings for each food group. The serving numbers are based on a diet of 2,000 calories a day. Talk to your doctor if youre unsure about your calorie needs. Along with getting the correct servings, the DASH plan also recommends a sodium intake less than 2,300 mg per day.        Grains  Servings: 6 to 8 a day  A serving is:  · 1 slice bread  · 1 ounce dry cereal  · Half a cup cooked rice, pasta or cereal  Best choices: Whole grains and any grains high in fiber. Vegetables  Servings: 4 to 5 a day  A serving is:  · 1 cup raw leafy vegetable  · Half a cup cut-up raw or cooked vegetable  · Half a cup vegetable juice  Best choices: Fresh or frozen vegetables prepared without added salt or fat.   Fruits  Servings: 4 to 5 a day  A serving is:  · 1 medium fruit  · One-quarter cup dried fruit  · Half a cup fresh, frozen, or canned fruit  · Half a cup of 100% fruit juices  Best choices: A variety of fresh fruits of different colors. Whole fruits are a better choice than fruit juices. Low-fat or fat-free dairy  Servings: 2 to 3 a day  A serving is:  · 1 cup milk  · 1 cup yogurt  · One and a half ounces cheese  Best choices: Skim or 1% milk, low-fat or fat-free yogurt or buttermilk, and low-fat cheeses.         Lean meats, poultry, fish  Servings: 6 or fewer a day  A serving is:  · 1 ounce cooked meats, poultry, or fish  · 1  egg  Best choices: Lean poultry and fish. Trim away visible fat. Broil, grill, roast, or boil instead of frying. Remove skin from poultry before eating. Limit how much red meat you eat.  Nuts, seeds, beans  Servings: 4 to 5 a week  A serving is:  · One-third cup nuts (one and a half ounces)  · 2 tablespoons nut butter or seeds  · Half a cup cooked dry beans or legumes  Best choices: Dry roasted nuts with no salt added, lentils, kidney beans, garbanzo beans, and whole sparks beans.   Fats and oils  Servings: 2 to 3 a day  A serving is:  · 1 teaspoon vegetable oil  · 1 teaspoon soft margarine  · 1 tablespoon mayonnaise  · 2 tablespoons salad dressing  Best choices: Nut and vegetable oils (nontropical vegetable oils), such as olive and canola oil. Sweets  Servings: 5 a week or fewer  A serving is:  · 1 tablespoon sugar, maple syrup, or honey  · 1 tablespoon jam or jelly  · 1 half-ounce jelly beans (about 15)  · 1 cup lemonade  Best choices: Dried fruit can be a satisfying sweet. Choose low-fat sweets. And watch your serving sizes!      For more on the DASH eating plan, visit:  www.nhlbi.nih.gov/health/health-topics/topics/dash   Date Last Reviewed: 6/1/2016  © 6328-9245 [employer]. [address] [place] [address], [place] [address]. All rights reserved. This information is not intended as a substitute for professional medical care. Always follow your healthcare professional's instructions.

## 2020-05-04 DIAGNOSIS — I10 ESSENTIAL HYPERTENSION: ICD-10-CM

## 2020-05-04 DIAGNOSIS — R39.15 URINARY URGENCY: ICD-10-CM

## 2020-05-04 RX ORDER — METOPROLOL TARTRATE 25 MG/1
25 TABLET, FILM COATED ORAL 2 TIMES DAILY
Qty: 180 TABLET | Refills: 1 | Status: SHIPPED | OUTPATIENT
Start: 2020-05-04

## 2020-05-04 RX ORDER — TAMSULOSIN HYDROCHLORIDE 0.4 MG/1
0.4 CAPSULE ORAL DAILY
Qty: 90 CAPSULE | Refills: 1 | Status: SHIPPED | OUTPATIENT
Start: 2020-05-04

## 2020-05-06 ENCOUNTER — LAB VISIT (OUTPATIENT)
Dept: LAB | Facility: HOSPITAL | Age: 70
End: 2020-05-06
Attending: FAMILY MEDICINE
Payer: MEDICARE

## 2020-05-06 ENCOUNTER — PATIENT MESSAGE (OUTPATIENT)
Dept: ADMINISTRATIVE | Facility: HOSPITAL | Age: 70
End: 2020-05-06

## 2020-05-06 DIAGNOSIS — E11.42 DIABETIC POLYNEUROPATHY ASSOCIATED WITH TYPE 2 DIABETES MELLITUS: ICD-10-CM

## 2020-05-06 DIAGNOSIS — R20.0 NUMBNESS: ICD-10-CM

## 2020-05-06 LAB
ALBUMIN SERPL BCP-MCNC: 4 G/DL (ref 3.5–5.2)
ALP SERPL-CCNC: 81 U/L (ref 55–135)
ALT SERPL W/O P-5'-P-CCNC: 19 U/L (ref 10–44)
ANION GAP SERPL CALC-SCNC: 11 MMOL/L (ref 8–16)
AST SERPL-CCNC: 15 U/L (ref 10–40)
BASOPHILS # BLD AUTO: 0.05 K/UL (ref 0–0.2)
BASOPHILS NFR BLD: 0.8 % (ref 0–1.9)
BILIRUB SERPL-MCNC: 0.4 MG/DL (ref 0.1–1)
BUN SERPL-MCNC: 11 MG/DL (ref 8–23)
CALCIUM SERPL-MCNC: 9.5 MG/DL (ref 8.7–10.5)
CHLORIDE SERPL-SCNC: 90 MMOL/L (ref 95–110)
CO2 SERPL-SCNC: 23 MMOL/L (ref 23–29)
CREAT SERPL-MCNC: 0.9 MG/DL (ref 0.5–1.4)
DIFFERENTIAL METHOD: ABNORMAL
EOSINOPHIL # BLD AUTO: 0.1 K/UL (ref 0–0.5)
EOSINOPHIL NFR BLD: 1.5 % (ref 0–8)
ERYTHROCYTE [DISTWIDTH] IN BLOOD BY AUTOMATED COUNT: 15.9 % (ref 11.5–14.5)
EST. GFR  (AFRICAN AMERICAN): >60 ML/MIN/1.73 M^2
EST. GFR  (NON AFRICAN AMERICAN): >60 ML/MIN/1.73 M^2
ESTIMATED AVG GLUCOSE: 148 MG/DL (ref 68–131)
GLUCOSE SERPL-MCNC: 117 MG/DL (ref 70–110)
HBA1C MFR BLD HPLC: 6.8 % (ref 4–5.6)
HCT VFR BLD AUTO: 36 % (ref 40–54)
HGB BLD-MCNC: 11.8 G/DL (ref 14–18)
IMM GRANULOCYTES # BLD AUTO: 0.02 K/UL (ref 0–0.04)
IMM GRANULOCYTES NFR BLD AUTO: 0.3 % (ref 0–0.5)
LYMPHOCYTES # BLD AUTO: 0.8 K/UL (ref 1–4.8)
LYMPHOCYTES NFR BLD: 12.4 % (ref 18–48)
MCH RBC QN AUTO: 28.1 PG (ref 27–31)
MCHC RBC AUTO-ENTMCNC: 32.8 G/DL (ref 32–36)
MCV RBC AUTO: 86 FL (ref 82–98)
MONOCYTES # BLD AUTO: 0.8 K/UL (ref 0.3–1)
MONOCYTES NFR BLD: 12.1 % (ref 4–15)
NEUTROPHILS # BLD AUTO: 4.8 K/UL (ref 1.8–7.7)
NEUTROPHILS NFR BLD: 72.9 % (ref 38–73)
NRBC BLD-RTO: 0 /100 WBC
PLATELET # BLD AUTO: 248 K/UL (ref 150–350)
PMV BLD AUTO: 8.9 FL (ref 9.2–12.9)
POTASSIUM SERPL-SCNC: 4.7 MMOL/L (ref 3.5–5.1)
PROT SERPL-MCNC: 7.6 G/DL (ref 6–8.4)
RBC # BLD AUTO: 4.2 M/UL (ref 4.6–6.2)
SODIUM SERPL-SCNC: 124 MMOL/L (ref 136–145)
VIT B12 SERPL-MCNC: 318 PG/ML (ref 210–950)
WBC # BLD AUTO: 6.59 K/UL (ref 3.9–12.7)

## 2020-05-06 PROCEDURE — 80053 COMPREHEN METABOLIC PANEL: CPT

## 2020-05-06 PROCEDURE — 83036 HEMOGLOBIN GLYCOSYLATED A1C: CPT

## 2020-05-06 PROCEDURE — 85025 COMPLETE CBC W/AUTO DIFF WBC: CPT

## 2020-05-06 PROCEDURE — 36415 COLL VENOUS BLD VENIPUNCTURE: CPT | Mod: PO

## 2020-05-06 PROCEDURE — 82607 VITAMIN B-12: CPT

## 2020-05-07 DIAGNOSIS — E87.1 HYPONATREMIA: Primary | ICD-10-CM

## 2020-06-08 ENCOUNTER — TELEPHONE (OUTPATIENT)
Dept: NEUROLOGY | Facility: CLINIC | Age: 70
End: 2020-06-08

## 2020-06-08 NOTE — TELEPHONE ENCOUNTER
Left message on voicemail for pt to call back. Need to reschedule appt on 6/17/2020 with Dr. Jean. Dr. Jean will not be in the office. Thanks, Esther

## 2020-06-09 ENCOUNTER — PATIENT OUTREACH (OUTPATIENT)
Dept: ADMINISTRATIVE | Facility: OTHER | Age: 70
End: 2020-06-09

## 2020-06-09 ENCOUNTER — TELEPHONE (OUTPATIENT)
Dept: NEUROLOGY | Facility: CLINIC | Age: 70
End: 2020-06-09

## 2020-06-09 ENCOUNTER — OFFICE VISIT (OUTPATIENT)
Dept: NEUROLOGY | Facility: CLINIC | Age: 70
End: 2020-06-09
Payer: MEDICARE

## 2020-06-09 DIAGNOSIS — Z96.89 SPINAL CORD STIMULATOR STATUS: ICD-10-CM

## 2020-06-09 DIAGNOSIS — G03.9 ARACHNOIDITIS: ICD-10-CM

## 2020-06-09 DIAGNOSIS — R29.6 MULTIPLE FALLS: ICD-10-CM

## 2020-06-09 DIAGNOSIS — R29.898 LEFT LEG WEAKNESS: ICD-10-CM

## 2020-06-09 DIAGNOSIS — E11.42 DIABETIC POLYNEUROPATHY ASSOCIATED WITH TYPE 2 DIABETES MELLITUS: ICD-10-CM

## 2020-06-09 DIAGNOSIS — M48.062 LUMBAR STENOSIS WITH NEUROGENIC CLAUDICATION: Primary | ICD-10-CM

## 2020-06-09 DIAGNOSIS — G90.522 COMPLEX REGIONAL PAIN SYNDROME TYPE 1 OF LEFT LOWER EXTREMITY: ICD-10-CM

## 2020-06-09 PROCEDURE — 99499 UNLISTED E&M SERVICE: CPT | Mod: 95,,, | Performed by: PSYCHIATRY & NEUROLOGY

## 2020-06-09 PROCEDURE — 99214 PR OFFICE/OUTPT VISIT, EST, LEVL IV, 30-39 MIN: ICD-10-PCS | Mod: 95,,, | Performed by: PSYCHIATRY & NEUROLOGY

## 2020-06-09 PROCEDURE — 99499 RISK ADDL DX/OHS AUDIT: ICD-10-PCS | Mod: 95,,, | Performed by: PSYCHIATRY & NEUROLOGY

## 2020-06-09 PROCEDURE — 1101F PT FALLS ASSESS-DOCD LE1/YR: CPT | Mod: CPTII,95,, | Performed by: PSYCHIATRY & NEUROLOGY

## 2020-06-09 PROCEDURE — 3044F HG A1C LEVEL LT 7.0%: CPT | Mod: CPTII,95,, | Performed by: PSYCHIATRY & NEUROLOGY

## 2020-06-09 PROCEDURE — 99214 OFFICE O/P EST MOD 30 MIN: CPT | Mod: 95,,, | Performed by: PSYCHIATRY & NEUROLOGY

## 2020-06-09 PROCEDURE — 1101F PR PT FALLS ASSESS DOC 0-1 FALLS W/OUT INJ PAST YR: ICD-10-PCS | Mod: CPTII,95,, | Performed by: PSYCHIATRY & NEUROLOGY

## 2020-06-09 PROCEDURE — 3044F PR MOST RECENT HEMOGLOBIN A1C LEVEL <7.0%: ICD-10-PCS | Mod: CPTII,95,, | Performed by: PSYCHIATRY & NEUROLOGY

## 2020-06-09 PROCEDURE — 1159F MED LIST DOCD IN RCRD: CPT | Mod: 95,,, | Performed by: PSYCHIATRY & NEUROLOGY

## 2020-06-09 PROCEDURE — 1159F PR MEDICATION LIST DOCUMENTED IN MEDICAL RECORD: ICD-10-PCS | Mod: 95,,, | Performed by: PSYCHIATRY & NEUROLOGY

## 2020-06-09 RX ORDER — OXCARBAZEPINE 300 MG/1
600 TABLET, FILM COATED ORAL 2 TIMES DAILY
Qty: 360 TABLET | Refills: 3 | Status: SHIPPED | OUTPATIENT
Start: 2020-06-09

## 2020-06-09 NOTE — TELEPHONE ENCOUNTER
Attempted to contact pt to set appt in August for virtual follow up per DR Jean. No Answer, left voice mail to call clinic back.

## 2020-06-09 NOTE — TELEPHONE ENCOUNTER
Spoke with pt and set virtual follow up per DR Jean's request in August. Date/Time/Location confirmed with pt. Verbalized understanding.

## 2020-06-09 NOTE — PATIENT INSTRUCTIONS
Please call our clinic at [phone] or send a message to Dr. Jean on the [facility] portal if there are any changes to the plan described below, for example,if you are not contacted for the requested tests, referral(s) within one week, if you are unable to receive the medications prescribed, or if you feel you need to change the treatment course for any reason.     TESTING:  -- none     REFERRALS:  -- refer to [facility] home [facility] for physical therapy (bedside exercises to prevent deconditioning) as patient is unable to tolerate outpatient aquatherapy - cannot get in the pool; he is at high risk for worsening falls and worsening leg weakness due to deconditioning related to chronic pain     PREVENTION OF PAIN:   -- continue trileptal 600mg twice a day   -- STOP gabapentin 600 mg 3 times a day  -- continue Lyrica 200 mg 3 times a day ([facility], [place])  -- continue duloxetine 60 mg daily ([facility])    AS-NEEDED TREATMENT OF PAIN:  -- continue tylenol 1000mg three times per day as needed   -- if the right thigh pain returns, please resume Aspercreme (over the counter) 3 times a day on the right thigh

## 2020-06-09 NOTE — TELEPHONE ENCOUNTER
Called patient and was able to reschedule virtual appt today with DR Jean at 10:00 am. Attempted to also add his wife as he requested however she is in hospital and unable to complete an appt today. Will reschedule her and note this information in her record. Pt confirmed Date/Time/Location for appt today. Verbalized understanding.

## 2020-06-09 NOTE — TELEPHONE ENCOUNTER
----- Message from Juan Diego Chapman sent at 6/9/2020  7:53 AM CDT -----  Contact: same  Patient called in and stated he received a message stating he needed to reschedule his appointment on 6/17 because Dr. Jean was going to be out.  Patient was offered 8/4 at 1:30pm but declined as he stated he can not wait that long.    Patient also stated his spouse has an appointment directly behind him at 2pm on 6/17 and they need to come in together.    Patient call back number is [phone]

## 2020-06-10 PROCEDURE — G0180 MD CERTIFICATION HHA PATIENT: HCPCS | Mod: ,,, | Performed by: PSYCHIATRY & NEUROLOGY

## 2020-06-10 PROCEDURE — G0180 PR HOME HEALTH MD CERTIFICATION: ICD-10-PCS | Mod: ,,, | Performed by: PSYCHIATRY & NEUROLOGY

## 2020-06-23 ENCOUNTER — EXTERNAL HOME HEALTH (OUTPATIENT)
Dept: HOME HEALTH SERVICES | Facility: HOSPITAL | Age: 70
End: 2020-06-23
Payer: MEDICARE

## 2020-06-24 ENCOUNTER — TELEPHONE (OUTPATIENT)
Dept: NEUROLOGY | Facility: CLINIC | Age: 70
End: 2020-06-24

## 2020-06-24 DIAGNOSIS — E11.42 DIABETIC POLYNEUROPATHY ASSOCIATED WITH TYPE 2 DIABETES MELLITUS: ICD-10-CM

## 2020-06-24 DIAGNOSIS — G03.9 ARACHNOIDITIS: ICD-10-CM

## 2020-06-24 DIAGNOSIS — M48.062 LUMBAR STENOSIS WITH NEUROGENIC CLAUDICATION: Primary | ICD-10-CM

## 2020-06-24 DIAGNOSIS — Z98.1 S/P LUMBAR SPINAL FUSION: ICD-10-CM

## 2020-06-24 NOTE — TELEPHONE ENCOUNTER
Spoke with Duglas with PT. Pt discharged from ER 6/23 for back pain. PT requesting extending PT for 1-2 weeks.

## 2020-06-25 NOTE — TELEPHONE ENCOUNTER
Called patient and informed that orders were placed to continue PT longer. Patient expressed understanding

## 2020-06-29 ENCOUNTER — PATIENT MESSAGE (OUTPATIENT)
Dept: NEUROLOGY | Facility: CLINIC | Age: 70
End: 2020-06-29

## 2020-06-29 DIAGNOSIS — M48.062 LUMBAR STENOSIS WITH NEUROGENIC CLAUDICATION: ICD-10-CM

## 2020-06-29 DIAGNOSIS — G03.9 ARACHNOIDITIS: Primary | ICD-10-CM

## 2020-06-29 RX ORDER — HYDROMORPHONE HYDROCHLORIDE 2 MG/1
2 TABLET ORAL EVERY 4 HOURS PRN
Qty: 20 TABLET | Refills: 0 | Status: SHIPPED | OUTPATIENT
Start: 2020-06-29 | End: 2020-07-06

## 2020-06-30 ENCOUNTER — DOCUMENT SCAN (OUTPATIENT)
Dept: HOME HEALTH SERVICES | Facility: HOSPITAL | Age: 70
End: 2020-06-30
Payer: MEDICARE

## 2020-06-30 ENCOUNTER — DOCUMENTATION ONLY (OUTPATIENT)
Dept: NEUROLOGY | Facility: CLINIC | Age: 70
End: 2020-06-30

## 2020-06-30 ENCOUNTER — TELEPHONE (OUTPATIENT)
Dept: NEUROLOGY | Facility: CLINIC | Age: 70
End: 2020-06-30

## 2020-06-30 NOTE — TELEPHONE ENCOUNTER
----- Message from Kateryna Reese sent at 6/30/2020  2:43 PM CDT -----  Type:  Patient Returning Call    Who Called:  Patient  Who Left Message for Patient:  Rica  Does the patient know what this is regarding?:  Scheduling appointment for CT Scan  Best Call Back Number:  [phone]  Additional Information:

## 2020-06-30 NOTE — TELEPHONE ENCOUNTER
----- Message from Madeleine Issa sent at 6/30/2020 12:01 PM CDT -----  [facility]  Type: Needs Medical Advice    Who Called:  Duglas Hercules Call Back Number: [phone]  Additional Information: Requesting a call back regarding the nurse wanted to know if the doctor wanted to more therapy or if pt needs a nurse to evaluate pt pain he still having.   Please Advise ---Thank you

## 2020-06-30 NOTE — TELEPHONE ENCOUNTER
----- Message from Ralph Avila sent at 6/30/2020  4:53 PM CDT -----  Regarding: Schedule CT Scan  Contact: Pt  Type:  Patient Returning Call    Who Called:  pt  Who Left Message for Patient:  Office  Does the patient know what this is regarding?:  CT scheduling  Best Call Back Number:    Additional Information:  Please follow up as soon as possible. Thank you

## 2020-06-30 NOTE — TELEPHONE ENCOUNTER
Spoke with Duglas with PT and informed per Dr. Jean note : goals met. Discharged from PT. Verbalized understanding.

## 2020-07-01 ENCOUNTER — TELEPHONE (OUTPATIENT)
Dept: NEUROLOGY | Facility: CLINIC | Age: 70
End: 2020-07-01

## 2020-07-01 NOTE — TELEPHONE ENCOUNTER
Left voicemail to return call in regards to Ct Scan. Pt portal message sent with appointment date.

## 2020-07-01 NOTE — TELEPHONE ENCOUNTER
----- Message from Renetta Ron sent at 7/1/2020  7:31 AM CDT -----  Contact: call [phone]    Type:  Patient Returning Call    Who Called:  pt  Who Left Message for Patient:    nurse  Does the patient know what this is regarding?:   about a  ct scan   Best Call Back Number: [phone]  Additional Information:    placed a call to pod

## 2020-07-02 ENCOUNTER — HOSPITAL ENCOUNTER (OUTPATIENT)
Dept: RADIOLOGY | Facility: HOSPITAL | Age: 70
Discharge: HOME | End: 2020-07-02
Attending: PSYCHIATRY & NEUROLOGY
Payer: MEDICARE

## 2020-07-02 DIAGNOSIS — M48.062 LUMBAR STENOSIS WITH NEUROGENIC CLAUDICATION: ICD-10-CM

## 2020-07-02 DIAGNOSIS — G03.9 ARACHNOIDITIS: ICD-10-CM

## 2020-07-02 PROCEDURE — 72131 CT LUMBAR SPINE W/O DYE: CPT | Mod: 26,,, | Performed by: RADIOLOGY

## 2020-07-02 PROCEDURE — 72131 CT LUMBAR SPINE WITHOUT CONTRAST: ICD-10-PCS | Mod: 26,,, | Performed by: RADIOLOGY

## 2020-07-02 PROCEDURE — 72131 CT LUMBAR SPINE W/O DYE: CPT | Mod: TC,PO

## 2020-07-06 ENCOUNTER — DOCUMENT SCAN (OUTPATIENT)
Dept: HOME HEALTH SERVICES | Facility: HOSPITAL | Age: 70
End: 2020-07-06
Payer: MEDICARE

## 2020-08-10 ENCOUNTER — PATIENT OUTREACH (OUTPATIENT)
Dept: ADMINISTRATIVE | Facility: OTHER | Age: 70
End: 2020-08-10

## 2020-08-12 ENCOUNTER — OFFICE VISIT (OUTPATIENT)
Dept: NEUROLOGY | Facility: CLINIC | Age: 70
End: 2020-08-12
Payer: MEDICARE

## 2020-08-12 ENCOUNTER — TELEPHONE (OUTPATIENT)
Dept: PAIN MEDICINE | Facility: CLINIC | Age: 70
End: 2020-08-12

## 2020-08-12 ENCOUNTER — TELEPHONE (OUTPATIENT)
Dept: NEUROLOGY | Facility: CLINIC | Age: 70
End: 2020-08-12

## 2020-08-12 ENCOUNTER — PATIENT MESSAGE (OUTPATIENT)
Dept: NEUROLOGY | Facility: CLINIC | Age: 70
End: 2020-08-12

## 2020-08-12 DIAGNOSIS — G03.9 ARACHNOIDITIS: ICD-10-CM

## 2020-08-12 DIAGNOSIS — E11.42 DIABETIC POLYNEUROPATHY ASSOCIATED WITH TYPE 2 DIABETES MELLITUS: ICD-10-CM

## 2020-08-12 DIAGNOSIS — M47.816 LUMBAR FACET ARTHROPATHY: ICD-10-CM

## 2020-08-12 DIAGNOSIS — M48.062 LUMBAR STENOSIS WITH NEUROGENIC CLAUDICATION: ICD-10-CM

## 2020-08-12 DIAGNOSIS — G90.522 COMPLEX REGIONAL PAIN SYNDROME TYPE 1 OF LEFT LOWER EXTREMITY: ICD-10-CM

## 2020-08-12 DIAGNOSIS — Z96.89 SPINAL CORD STIMULATOR STATUS: ICD-10-CM

## 2020-08-12 DIAGNOSIS — G03.9 ARACHNOIDITIS: Primary | ICD-10-CM

## 2020-08-12 DIAGNOSIS — R29.898 LEFT LEG WEAKNESS: ICD-10-CM

## 2020-08-12 DIAGNOSIS — R29.6 MULTIPLE FALLS: ICD-10-CM

## 2020-08-12 PROCEDURE — 1159F PR MEDICATION LIST DOCUMENTED IN MEDICAL RECORD: ICD-10-PCS | Mod: 95,,, | Performed by: PSYCHIATRY & NEUROLOGY

## 2020-08-12 PROCEDURE — 1101F PT FALLS ASSESS-DOCD LE1/YR: CPT | Mod: CPTII,95,, | Performed by: PSYCHIATRY & NEUROLOGY

## 2020-08-12 PROCEDURE — 3044F PR MOST RECENT HEMOGLOBIN A1C LEVEL <7.0%: ICD-10-PCS | Mod: CPTII,95,, | Performed by: PSYCHIATRY & NEUROLOGY

## 2020-08-12 PROCEDURE — 1159F MED LIST DOCD IN RCRD: CPT | Mod: 95,,, | Performed by: PSYCHIATRY & NEUROLOGY

## 2020-08-12 PROCEDURE — 99214 OFFICE O/P EST MOD 30 MIN: CPT | Mod: 95,,, | Performed by: PSYCHIATRY & NEUROLOGY

## 2020-08-12 PROCEDURE — 3044F HG A1C LEVEL LT 7.0%: CPT | Mod: CPTII,95,, | Performed by: PSYCHIATRY & NEUROLOGY

## 2020-08-12 PROCEDURE — 1101F PR PT FALLS ASSESS DOC 0-1 FALLS W/OUT INJ PAST YR: ICD-10-PCS | Mod: CPTII,95,, | Performed by: PSYCHIATRY & NEUROLOGY

## 2020-08-12 PROCEDURE — 99214 PR OFFICE/OUTPT VISIT, EST, LEVL IV, 30-39 MIN: ICD-10-PCS | Mod: 95,,, | Performed by: PSYCHIATRY & NEUROLOGY

## 2020-08-12 RX ORDER — HYDROMORPHONE HYDROCHLORIDE 4 MG/1
TABLET ORAL
Qty: 20 TABLET | Refills: 0 | Status: SHIPPED | OUTPATIENT
Start: 2020-08-12 | End: 2020-08-12 | Stop reason: SDUPTHER

## 2020-08-12 RX ORDER — HYDROMORPHONE HYDROCHLORIDE 4 MG/1
TABLET ORAL
Qty: 20 TABLET | Refills: 0 | Status: SHIPPED | OUTPATIENT
Start: 2020-08-12

## 2020-08-12 NOTE — Clinical Note
Please transition care from Miranda to Dr Blackmon - multiple pain related conditions including failed back, arachnoiditis, CRPS, s/p stimulator. Doing very poorly, please see in Sept if possible.

## 2020-08-12 NOTE — TELEPHONE ENCOUNTER
----- Message from Juan Diego Chapman sent at 8/12/2020  1:31 PM CDT -----  Contact: Jose Armando Marinelli/Onelia Rousseau  called in regarding patients Rx for HYDROmorphone (DILAUDID) 4 MG tablet.  Onelia stated this Rx must be resent with the correct notation about that fact this is more than a 7 day supply.      [id number] - [facility], [address] [place] [address], [address] [place] [id number]  Phone: [phone] Fax: [phone]

## 2020-08-12 NOTE — PATIENT INSTRUCTIONS
Please call our clinic at [phone] or send a message to Dr. Jean on the [employer] portal if there are any changes to the plan described below, for example,if you are not contacted for the requested tests, referral(s) within one week, if you are unable to receive the medications prescribed, or if you feel you need to change the treatment course for any reason.     TESTING:  -- none     REFERRALS:  -- refer to pain management physician Dr. Blackmon for continuation of care     PREVENTION OF PAIN:   -- continue trileptal 600mg twice a day   -- continue Lyrica 200 mg 3 times a day ([facility]Rx, [place])  -- continue duloxetine 60 mg daily ([facility])    AS-NEEDED TREATMENT OF PAIN:  -- trial dilaudid 4mg oral - 1-2 times per day as needed for severe pain - #20 tablets   -- my next recommendation would be to try methadone given it has an effect against both musculoskeletal and nerve pain but I am leaving the practice thus it would up to your next pain management doctor to try   -- continue tylenol 1000mg three times per day as needed   -- if the right thigh pain returns, please resume Aspercreme (over the counter) 3 times a day on the right thigh

## 2020-08-12 NOTE — PROGRESS NOTES
"Date of service: 8/12/2020  Referring provider: No ref. provider found    Subjective:      Chief complaint: Pain       Patient ID: Domingo Vaca is a 69 y.o. gentleman with lumbar arachnoiditis, peripheral artery disease, type 2 diabetes, history of cervical fusion, history of lumbar fusion, history of left ankle fusion, CRPS of left lower extremity who is presenting for follow up of pain     History of Present Illness    INTERVAL HISTORY - 8/12/2020    The patient location is: home  The chief complaint leading to consultation is: pain     Visit type: audiovisual    Face to Face time with patient: 20 min   30 minutes of total time spent on the encounter, which includes face to face time and non-face to face time preparing to see the patient (eg, review of tests), Obtaining and/or reviewing separately obtained history, Documenting clinical information in the electronic or other health record, Independently interpreting results (not separately reported) and communicating results to the patient/family/caregiver, or Care coordination (not separately reported).     Each patient to whom he or she provides medical services by telemedicine is:  (1) informed of the relationship between the physician and patient and the respective role of any other health care provider with respect to management of the patient; and (2) notified that he or she may decline to receive medical services by telemedicine and may withdraw from such care at any time.    The last visit was 2 months ago at which point I referred to home health for PT. He was soon discharged from PT for "goals being met" - he reports there was very little he could do. He continued to have significant pain so we repeated CT lumbar spine on 7/2/2020 which was unchanged from previous.     Today he reports he is much worse. This is the worst he has ever been. His radicular pain is now in both legs, from the back to the ankles, but is relatively controlled with the " trileptal and lyrica. The right thigh, which we are calling meralgia paresthetica, is not painful anymore, instead it is just numb. His lower back pain, midline, is severe. He cannot find a comfortable position. Laying on his side seems to be the best. His legs shake when he attempts to walk. He has a difficult time just walking to his power wheel chair. Physical therapy discharged him because there was so little he could do. On 6/29/2020 I had sent over dilaudid 2mg #20 to treat a significant pain flare after he had done to the ED on 6/22 for acute pain and was given several doses of IV dilaudid 0.5mg and norflex with partial relief. He reports the oral 2mg dose I ordered helped, but very little. He did not double up on the pills. He feels like giving up.    INTERVAL HISTORY - 6/9/2020    The patient location is: home  The chief complaint leading to consultation is: pain     Visit type: audiovisual    Face to Face time with patient: 25  30 minutes of total time spent on the encounter, which includes face to face time and non-face to face time preparing to see the patient (eg, review of tests), Obtaining and/or reviewing separately obtained history, Documenting clinical information in the electronic or other health record, Independently interpreting results (not separately reported) and communicating results to the patient/family/caregiver, or Care coordination (not separately reported).     Each patient to whom he or she provides medical services by telemedicine is:  (1) informed of the relationship between the physician and patient and the respective role of any other health care provider with respect to management of the patient; and (2) notified that he or she may decline to receive medical services by telemedicine and may withdraw from such care at any time.    The last visit was approximately 2 months ago, he was doing very poorly, I instructed him to stop citalopram and I raised his duloxetine to 60 mg daily.   We also tried again to switch is gabapentin to Lyrica. I also recommended capsaicin TID to the right thigh    Today he reports he is better. The capsaicin has helped his right thigh and this nerve pain is now gone, and despite stopping the capsaicin, the pain has not return for the past 3 weeks.  He continues to have falls - about 3-4 since last visit. His legs continue to become weaker. He is taking a few steps to his commode. He uses a walker. His leg pain will be severe if he is on his legs for more than a few minutes. He is able to sit longer more comfortably than before with the thigh pain being gone.  Despite the changes we made to his Lyrica and his duloxetine, he feels that overall the leg pain is unchanged.    His wife has recently been hospitalized 3 times with heart failure, kidney failure, profuse nose bleeding.    We discussed starting physical therapy back up.  Unfortunately he is able to tolerate very little physical therapy due to his pain.  We tried aqua therapy earlier in the year however even just getting into the pool was very painful for him.  He does think that he could attempt bedside physical therapy at home.    He has misunderstood the instructions and is taking both gabapentin 600 mg t.i.d. and Lyrica 200 mg t.i.d..    INTERVAL HISTORY - 4/16/2020    The patient location is: home   The chief complaint leading to consultation is: pain   Visit type: audiovisual  Total time spent with patient: 15 min   Each patient to whom he or she provides medical services by telemedicine is:  (1) informed of the relationship between the physician and patient and the respective role of any other health care provider with respect to management of the patient; and (2) notified that he or she may decline to receive medical services by telemedicine and may withdraw from such care at any time.    At the last visit 3 months ago he was doing poorly. I referred him to aquatherapy ([facility]). In the interim  "he had problems getting the Lyrica covered so we changed back to gabaepentin 600mg TID. He was previously using the [employer] patient assistance program for the Lyrica but when that , he reports some conditions of the program changed. It was not affordable through [facility] but he may be able to get the generic from [facility]. The Trleptal is more affordable for retail price through [facility]. The duloxetine is free at [facility]    Today he reports he is much worse. He has had to lay in bed flat for last 5 weeks as he has had nerve pain in the right thigh from the groin to the knee. Only thing that relieves it is to lay flat. If he sits up it is very painful (9/10). It is even worse if he sits on the commode (10/10). This pain is associated with numbness. He reports the strength in the right leg is the same.    He is taking trileptal 600mg BID. He tried taking an extra 300mg mid-day that did not help and caused him some vision changes. Since lowering the dose to 600mg BID that vision change resolved. It is still working very well for his arachnoiditis pain.    He has been on the gabapentin for 4 weeks. It is not helping at all. He is taking 600mg TID. He agreeable to trying Lyrica again.     He reports he has been on celexa for 8 or more years and that was for "heart health" and he does not have depression or anxiety.      INTERVAL HISTORY - 2020     Mr. Caroline Hand was last seen 3 months ago.  At that point we had him try various Trileptal doses.  He reports that at 900 mg twice a day his vision was blurred so he went back to 600 twice a day which resolved the blurred vision and it does provide significant relief of the pain.    Today he reports he has little worse.  Specifically there is increasing weakness, leg pain, and numbness in his legs. He has been relying more on his power wheelchair.  He can only take a few steps before he has pain. He feels that the left leg is almost useless.  Whereas previously he was " numb in the thigh he is now numb below the knee into the calf region.  The right leg is hurting more, not a shock-like pain but a deep pain. The Trileptal 600 b.i.d. does provided significant relief to the neuralgic pain. He fell 5 times in the last 6 weeks due to his legs giving way.    He feels that his memory has been poor and his concentration has been poor.    Recently he has had some difficulty swallowing, as of the last 3 months, he could not have an endoscopy given that he was recently placed on Plavix after he had a stent, thus he had an EGD which showed pooling in the vallecula but no other abnormalities.  It was suggested to him that may be this has something to do with the anterior fusion.    INTERVAL HISTORY - 10/30/19     The last visit was 1 month ago at which point I started Trileptal.  In the interim he was admitted to [facility] from 9/26/19 to 10/1/19 for acute respiratory early failure, aspiration pneumonia, over narcosis, placement of drug eluting stent.  During the hospitalization his opiate regimen was changed.  Afterwards he message me that he was out of narcotics, they had not been helping anyway, we mutually decided to stop them.  He has been off opiates for 1 month and has been doing very well.  He reports there was no worsening of pain for doing so.  In the hospital the Trileptal was raise to 600 b.i.d. any reports it is helping 20% with his neuralgic pain down the legs. He is tolerating it without any side effects. He reports he does not think the Lyrica 200 mg t.i.d. is helping.    INTERVAL HISTORY - 9/24/19     He was last seen in the clinic approximately 5-6 months ago.     His chronic pain history is complicated, and in my opinion is representation of lumbar stenosis with neurogenic claudication, arachnoiditis, complex regional pain syndrome, diabetic polyneuropathy, facet arthropathy.  I made several recommendations for treating arachnoiditis pain. He was following with Dr. Willis  Rolanda.    In the interim, Dr. Orantes tried to rotate to Nucynta which was denied by his insurance.    He is currently on Lyrica 200 mg t.i.d..  In August Em Orantes raised his oxycodone to 15 mg 4 times a day.  He has not had any further procedures.    Today he is much worse. He reports his pain is progressing. He is unable to walk, using wheelchair for this visit. At home he ambulates short distances with extreme pain, he reports Dr. Iqbal is trying to order a power wheelchair for this purpose.  Otherwise he has a walker.    He is in pain 100% of the time.  4-5 times per day he will have severe, sudden, episodes of stabbing and burning from the buttocks to the toes in the right leg, and the knee to the toes in the left leg.  The right buttocks is constantly sore.  Afterwards there is a remaining soreness in the legs.    The oxycodone 15mg is not helping at all, even when he takes 2-3 in a span of a couple hours.     He has developed mild urinary incontinence.    His current pain score is 10.       ORIGINAL PAIN HISTORY - 4/4/19   Age at onset and course over time:  He has a long history of chronic low back pain due to flat back, congenitally narrow canal. He had a previous L4-S1 PSIF with Dr Grimes in 2014 followed by SCS placement with Dr Navarro at [facility] in 2015. He has had 7 surgeries total. He had numerous epidural steroids in the past with long-term failure and he was only getting modest relief after the spinal cord stimulator.  Prior to surgery the pain was in the low back, radiating down his thighs into the calves bilaterally worse on the right.  He had no weakness or urinary symptoms.  Pain was interfering with his sleep, sitting, ambulation.    The pain became worse in late 2017. He had a posterior fusion at L2-S1 and the L2-4 decompression with Dr. Camp on 03/06/2018.  Post-op there was immediate left leg weakness and numbness. On exam in the hospital, there was absence of left patellar reflex and  weak left knee extension more than left hip flexion. I thought this was a post-op femoral neuropathy from abdominal compression during surgery. Per Dr. Camp's note, there was intra-op left L4 nerve root injury due to scar tissue dissection.    In May 2018 the right leg pain returned suddenly. He had a NCS/EMG 5/24/18 with Dr. Joyner which showed severe incomplete L4 radiculopathy and bilateral chronic S1 radiculopathy.     He did have bilateral stents in the lower extremities in 2016 so a CTA was done to check the patency.  The stents were patent.  There was residual peripheral arterial occlusive disease in the right common femoral artery and the right popliteal artery.  The left superficial femoral artery was occluded. Dr. Arroyo felt that this anatomically could not explain his pain.      Within the last year his morphine went from 30mg daily to 60mg daily. This did nothing. He consulted with Dr. Lazaro Orantes and had an intrathecal morphine trial. This was ineffective. Then tried dilaudid intrathecally without effect, I do not know the doses.      Left leg is numb from hip to knee. There is a burning pain in the left knee. He reports he can handle this, the problem is his right leg which has excruciating pain from the hip to the ankle. Muscles feel on fire. He had this before the surgery but now it feels more muscular. He reports that before, his muscles were not giving out on him.     He was admitted to [facility] with acute upper GI bleed causing hypovolemic and possible septic shock. Needed pressors and blood products. He had a GI ulcer.     Back pain is manageable with the stimulator.  The stimulator does also cover the left lower extremity for the CRPS.    Location:  Right buttocks, down the hamstring, into the calf and lateral ankle  Radiation:    Quality: numb burning type pain feels like a constant patricia horse pain   Severity: 9  Duration: constant with escalations after 5-10 min of walking   Frequency: daily    Alleviated by: laying down  Exacerbated by: sitting is better than walking, but both hurt. He has to sit down for a while to make it go away.   Associated with: right leg buckling; no right leg numbness, no swelling, no temperature change, no bowel or bladder  Sleep habits:    Current acute treatment:  Tylenol 1000mg TID   (plavix)    Current prevention:  Trileptal 600 b.i.d. ($48 cash price [facility])  Gabapentin 600mg TID - no relief but $0 at [facility]  Lyrica 200 mg t.i.d. ($27 good rx [facility])  duloxetine 60mg daily - started 3/28/19, raised 4/2020    Previously tried:   Oxycodone 15 mg #120 per month  -- levorphanol 2mg - no response   -- morphine ER 30mg BID   -- gabapentin 1200mg TID - ineffective  -- percocet 10mg/325mg - 1.5 - 2 hours of relief only; takes 3 to 4 per day   -- dilaudid 2mg - partial relief     Procedural history:   -- has spinal cord stimulator for CRPS  -- s/p intrathecal morphine and fentanyl trials - no response but unclear dose   -- multiple epidurals    Review of patient's allergies indicates:   Allergen Reactions    Glipizide Rash     Current Outpatient Medications   Medication Sig Dispense Refill    acetaminophen (TYLENOL) 500 MG tablet Take 1,000 mg by mouth every 6 (six) hours as needed for Pain.      amLODIPine (NORVASC) 5 MG tablet Take 1 tablet (5 mg total) by mouth every evening. 90 tablet 3    aspirin (ECOTRIN) 81 MG EC tablet Take 1 tablet (81 mg total) by mouth once daily. for 14 days 14 tablet 0    clopidogrel (PLAVIX) 75 mg tablet Take 1 tablet (75 mg total) by mouth once daily. 90 tablet 3    DULoxetine (CYMBALTA) 60 MG capsule Take 1 capsule (60 mg total) by mouth once daily. 90 capsule 3    HYDROmorphone (DILAUDID) 4 MG tablet 1 tab PO 1-2 times a day for severe pain. 20 tablet 0    metFORMIN (GLUCOPHAGE-XR) 500 MG XR 24hr tablet Take one tablets once daily with breakfast. 90 tablet 3    metoprolol tartrate (LOPRESSOR) 25 MG tablet Take 1 tablet (25 mg total)  by mouth 2 (two) times daily. 180 tablet 1    olmesartan (BENICAR) 40 MG tablet Take 1 tablet (40 mg total) by mouth once daily. 90 tablet 3    OXcarbazepine (TRILEPTAL) 300 MG Tab Take 2 tablets (600 mg total) by mouth 2 (two) times daily. 360 tablet 3    pregabalin (LYRICA) 200 MG Cap Take 1 capsule (200 mg total) by mouth 3 (three) times daily. 90 capsule 5    simvastatin (ZOCOR) 40 MG tablet Take 1 tablet (40 mg total) by mouth every evening. 90 tablet 3    tamsulosin (FLOMAX) 0.4 mg Cap Take 1 capsule (0.4 mg total) by mouth once daily. 90 capsule 1    tiZANidine (ZANAFLEX) 4 MG tablet Take 1 tablet (4 mg total) by mouth every 6 (six) hours as needed. 30 tablet 0     No current facility-administered medications for this visit.        Past Medical History  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    Anticoagulant long-term use     Arthritis     Cervical stenosis of spine     Chronic cervical pain     Chronic lumbar pain     Claudication of both lower extremities     Coronary artery disease     1 stent     Disorder of kidney and ureter     History of right common carotid artery stent placement     HTN (hypertension)     Hyperlipidemia     Lumbar facet arthropathy 02/2018    Lumbar stenosis with neurogenic claudication 02/2018    Narcotic dependence 9/25/2019    Obesity     PVD (peripheral vascular disease)     stents 1 left; 2 right    Respiratory failure 9/25/2019    RSD lower limb     Left ankle    S/P insertion of spinal cord stimulator     not working    S/P lumbar spinal fusion     multiple       Past Surgical History  Past Surgical History:   Procedure Laterality Date    ABDOMINAL AORTOGRAPHY N/A 9/30/2019    Procedure: Aortogram, Abdominal;  Surgeon: Kael Woo MD;  Location[facility] CATH;  Service: Cardiology;  Laterality: N/A;    ANGIOGRAM, CORONARY, WITH LEFT HEART  CATHETERIZATION  11/5/2019    Procedure: Angiogram, Coronary, with Left Heart Cath;  Surgeon: Rivera Soto MD;  Location: [facility] CATH LAB;  Service: Cardiology;;    ANGIOPLASTY      Aortagram with Runoff; stent leg    ANKLE SURGERY Left     Multiple surgeries; now fused    APPENDECTOMY      BACK SURGERY      7 total    BRONCHOSCOPY Right 3/19/2019    Procedure: Bronchoscopy;  Surgeon: Antony Coelho Jr. DO;  Location: [facility] ENDO;  Service: Pulmonary;  Laterality: Right;    CAROTID STENT Left     CARPAL TUNNEL RELEASE Bilateral     left twice; right once    CERVICAL FUSION      over 5 levels    COLONOSCOPY  ~2015    normal findings per patient report    CORONARY ANGIOGRAPHY N/A 9/27/2019    Procedure: ANGIOGRAM, CORONARY ARTERY;  Surgeon: Michael Ruiz MD;  Location: [facility] CATH;  Service: Cardiology;  Laterality: N/A;    CORONARY STENT PLACEMENT N/A 11/5/2019    Procedure: INSERTION, STENT, CORONARY ARTERY;  Surgeon: Rivera Soto MD;  Location: [facility] CATH LAB;  Service: Cardiology;  Laterality: N/A;    ESOPHAGOGASTRODUODENOSCOPY N/A 3/16/2019    Procedure: EGD (ESOPHAGOGASTRODUODENOSCOPY)-in icu;  Surgeon: Angel Mckeon MD;  Location: [facility] ENDO;  Service: Endoscopy;  Laterality: N/A;  A large amount of food (residue) in the stomach, esophageal ulcer    ESOPHAGOGASTRODUODENOSCOPY Left 3/18/2019    Procedure: EGD (ESOPHAGOGASTRODUODENOSCOPY)- in ICU on drip;  Surgeon: Angel Mckeon MD;  Location: [facility] ENDO;  Service: Endoscopy;  Laterality: Left; esophageal ulcer, Erythematous mucosa was found in the gastric body, consistent with NG tube trauma    LEFT HEART CATHETERIZATION Left 9/27/2019    Procedure: Left heart cath;  Surgeon: Michael Ruiz MD;  Location: [facility] CATH;  Service: Cardiology;  Laterality: Left;    LEFT HEART CATHETERIZATION Right 9/30/2019    Procedure: Left heart cath;  Surgeon: Kael Woo MD;  Location: [facility] CATH;  Service: Cardiology;   Laterality: Right;    pain injection      Multiple    PERCUTANEOUS TRANSLUMINAL BALLOON ANGIOPLASTY OF CORONARY ARTERY  2019    Procedure: Angioplasty-coronary;  Surgeon: Kael Woo MD;  Location: [facility];  Service: Cardiology;;    POSTERIOR FUSION LUMBAR SPINE W/ CORPECTOMY      twice in last 18 mos (3/2016)    SPINAL CORD STIMULATOR IMPLANT  implanted 12/8/15    having great pain relief    SYMPATHECTOMY         Family History  Family History   Problem Relation Age of Onset    No Known Problems Mother     Heart disease Father     Early death Father 56        MI    Glaucoma Neg Hx     Colon cancer Neg Hx     Colon polyps Neg Hx     Crohn's disease Neg Hx     Esophageal cancer Neg Hx     Stomach cancer Neg Hx     Ulcerative colitis Neg Hx        Social History  Social History     Socioeconomic History    Marital status:      Spouse name: Not on file    Number of children: Not on file    Years of education: Not on file    Highest education level: Not on file   Occupational History    Not on file   Social Needs    Financial resource strain: Somewhat hard    Food insecurity     Worry: Often true     Inability: Often true    Transportation needs     Medical: Yes     Non-medical: Yes   Tobacco Use    Smoking status: Former Smoker     Packs/day: 2.00     Years: 45.00     Pack years: 90.00     Types: Cigarettes     Quit date:      Years since quittin.6    Smokeless tobacco: Never Used    Tobacco comment: vapes with nicotine since    Substance and Sexual Activity    Alcohol use: No     Frequency: Never     Binge frequency: Never    Drug use: No    Sexual activity: Not Currently     Partners: Female   Lifestyle    Physical activity     Days per week: 0 days     Minutes per session: 0 min    Stress: To some extent   Relationships    Social connections     Talks on phone: Once a week     Gets together: Never     Attends Jainism service: Not on file      Active member of club or organization: No     Attends meetings of clubs or organizations: Never     Relationship status:    Other Topics Concern    Not on file   Social History Narrative    Not on file        Review of Systems  14-point review of systems as follows:   No check yomi indicates NEGATIVE response   Constitutional: [] weight loss, [] change to appetite   Eyes: [] change in vision, [] double vision   Ears, nose, mouth, throat: [] frequent nose bleeds, [] ringing in the ears   Respiratory: [] cough, [] wheezing   Cardiovascular: [] chest pain, [] palpitations   Gastrointestinal: [] jaundice, [] nausea/vomiting   Genitourinary: [] incontinence, [] burning with urination   Hematologic/lymphatic: [] easy bruising/bleeding, [] night sweats   Neurological: [x] numbness, [x] weakness   Endocrine: [] fatigue, [] heat/cold intolerance   Allergy/Immunologic: [] fevers, [] chills   Musculoskeletal: [x] muscle pain, [x] joint pain   Psychiatric: [] thoughts of harming self/others, [] depression   Integumentary: [] rashes, [] sores that do not heal     Objective:        There were no vitals filed for this visit.  There is no height or weight on file to calculate BMI.     8/12/2020  Laying in bed on his right side     6/9/2020  The patient is sitting upright, he appears in good spirits      4/4/19   Constitutional: appears in no acute distress, well-developed, well-nourished     Eyes: normal conjunctiva, PERRLA     Ears, nose, mouth, throat: external appearance of ears and nose normal, hearing intact     Cardiovascular: regular rate and rhythm, no murmurs appreciated    Respiratory: unlabored respirations, breath sounds normal bilaterally    Gastrointestinal: no visible abdominal masses, no guarding, no visible hernia    Musculoskeletal:   All muscle groups of the upper extremities are 5/5  The right leg is without atrophy  The left leg shows muscle atrophy in the quads and below the knee  The left leg is with  mottling below the knee, slight rubor in the foot, mild swelling of the foot, several healed ulcers, portions of the left leg or cooler than the right.  This is his baseline CRPS.  With full effort he does demonstrate short durations of full strength in all lower extremity muscle groups bilaterally except for the left ankle which is surgically fused  His gait is antalgic    Psychiatric: normal judgment and insight. Oriented to person, place, and time.     Neurologic:   Cortical functions: recent and remote memory intact, normal attention span and concentration, speech fluent, adequate fund of knowledge   Cranial nerves: EOMI, symmetric facial strength  Reflexes: 2+ in the upper and lower extremities, no Rojo  Sensation:    -- There is reduced sensation to temperature in the left C7 dermatome  -- there is loss of light touch, temperature, pinprick sensation in the entire left lower extremity without a dermatomal preference, proprioception and vibration are absent on left  -- there is preservation of light touch, temperature, pinprick in the entire right leg except for the dorsum of the foot.  Vibration is slightly reduced at the right toe, proprioception intact on the right  Coordination: normal    Data Review:     I have personally reviewed the referring provider's notes, labs, & imaging made available to me today.     RADIOLOGY STUDIES:  I have personally reviewed the pertinent images performed.     Results for orders placed or performed during the hospital encounter of 03/16/19   CT Head Without Contrast    Narrative    EXAMINATION:  CT HEAD WITHOUT CONTRAST    CPT: 04684    CLINICAL HISTORY:  Confusion/delirium, altered LOC, unexplained;Syncope/fainting;.    TECHNIQUE:  Axial CT slices through the head were obtained without the administration of contrast.  Sagittal and coronal reconstructions were performed.  Automated exposure control was utilized.  Total DLP is approximately 674 mGy cm.    COMPARISON:  None  available.    FINDINGS:  Mild-to-moderate generalized involutional changes are seen.  No evidence of acute intracranial hemorrhage, mass effect, midline deviation, hydrocephalus, or abnormal extra-axial fluid collection is visualized.  There appears to be  periventricular and deep white matter the hypoattenuation which is nonspecific, but is most commonly associated with chronic microvascular ischemic changes.  No evidence of acute large vessel territory ischemia/infarction is appreciated.  MRI with diffusion-weighted imaging is more sensitive in the assessment of acute ischemia/infarction.  The basilar cisterns are preserved. Minimal mucosal thickening in the lateral left maxillary sinus is seen.  The mastoid air cells appear to be grossly clear.  No acute displaced calvarial fracture is visualized.      Impression    1. No acute intracranial abnormality is visualized.      Electronically signed by: Ruslan Crowe MD  Date:    03/17/2019  Time:    12:31     04/5/2018 x-ray lumbar spine  Pedicle screws with janice fixation L2-L3-L4-L5 S1  Disc prosthesis L4-5 and L5-S1  Anterior listhesis of L2 on L3  Retrolisthesis of L3 on L4  Prior laminectomy  Facet arthropathy at multiple levels  Dorsal column stimulator at T9  No hardware complication    03/10/2018 myelogram with CT lumbar spine  -- soft tissue/longitudinal ligament thickening along the posterior vertebral body contours and extending caudally from L1 to the sacral elements.    -- This produces significant multilevel moderate to severe constriction of the thecal sac with noted crowding of the cauda equina.  These changes are most significant at L1-2 through L3-4.    At L3-4, there is a large right asymmetric focal disc protrusion resulting in posterior and leftward displacement of the thecal sac and its contents.  Additionally, there is redemonstrated metallic structure adjacent to the left thecal sac contour in the region of the lateral recess at L4-5; this is  unchanged in comparison to the prior.    Stable postoperative and extensive degenerative changes of the lumbar spine.  No evidence of organized fluid collection, acute spinal column disruption, or other significant interval alteration is appreciated.    Lab Results   Component Value Date     (L) 05/06/2020    K 4.7 05/06/2020    MG 2.5 03/16/2019    CL 90 (L) 05/06/2020    CO2 23 05/06/2020    BUN 11 05/06/2020    CREATININE 0.9 05/06/2020     (H) 05/06/2020    HGBA1C 6.8 (H) 05/06/2020    AST 15 05/06/2020    ALT 19 05/06/2020    ALBUMIN 4.0 05/06/2020    PROT 7.6 05/06/2020    BILITOT 0.4 05/06/2020    CHOL 133 01/31/2020    HDL 48 01/31/2020    LDLCALC 66.4 01/31/2020    TRIG 93 01/31/2020       Lab Results   Component Value Date    WBC 6.59 05/06/2020    HGB 11.8 (L) 05/06/2020    HCT 36.0 (L) 05/06/2020    MCV 86 05/06/2020     05/06/2020       Lab Results   Component Value Date    TSH 0.497 09/25/2019       Assessment & Plan:       Problem List Items Addressed This Visit        Neuro    Lumbar stenosis with neurogenic claudication    Overview     Status post multiple epidural steroid injections and seven back surgeries, most recent L2-S1 fusion 3/6/18          Relevant Medications    HYDROmorphone (DILAUDID) 4 MG tablet    Complex regional pain syndrome type 1 of left lower extremity    Diabetic polyneuropathy associated with type 2 diabetes mellitus    Overview     No large fiber neuropathy on NCS 5/24/18 but patient with sensory loss in the feet, most likely a diabetic small fiber neuropathy         Spinal cord stimulator status    Overview     Controlling his back pain and left leg CRPS pain            ID    Arachnoiditis - Primary    Overview     Patient most likely has lumbar he has of arachnoiditis after multiple lumbar procedures in 7 surgeries  Most likely the cause of his severe, progressive, right lower extremity pain refractory to surgery (in fact worse after surgery)    At  initial consult we had discussed - use of neuro modulating opiate like Nucynta or methadone (nucynta was denied by insurance), lumbar sympathetic blocks, neuropathic intrathecal agents (bupivacaine, clonidine, Prialt)    His pain is showing opiate refractory qualities thus I do not think rotation to methadone is appropriate this point.  Rather, his worst pain is paroxysmal lumbar neuralgia, most appropriate medication would be Trileptal as this has excellent neuralgia coverage         Relevant Medications    HYDROmorphone (DILAUDID) 4 MG tablet    Other Relevant Orders    Ambulatory referral/consult to Pain Clinic       Orthopedic    Lumbar facet arthropathy    Left leg weakness    Overview     Due to L4 nerve root injury during scar tissue dissection, improving.             Other    Multiple falls    Overview     Multifactorial, poly radiculopathy from lumbar arachnoiditis, left leg weakness due to L4 nerve injury, chronic pain, deconditioning due to chronic pain    Patient is unable to performed outpatient physical therapy including aquatherapy secondary to significant pain and deconditioning.  Explained that further avoidance of physical activity will continue to weak in his legs and therefore worsen his pain.  We need to at least get him doing some bedside exercises to strengthen his legs. Completed home health PT 6/2020.                   Please call our clinic at [phone] or send a message to Dr. Jean on the [employer] portal if there are any changes to the plan described below, for example,if you are not contacted for the requested tests, referral(s) within one week, if you are unable to receive the medications prescribed, or if you feel you need to change the treatment course for any reason.     TESTING:  -- none     REFERRALS:  -- refer to pain management physician Dr. Blackmon for continuation of care     PREVENTION OF PAIN:   -- continue trileptal 600mg twice a day   -- continue Lyrica 200 mg 3 times a  day (Angelita Reeves)  -- continue duloxetine 60 mg daily ([employer])    AS-NEEDED TREATMENT OF PAIN:  -- trial dilaudid 4mg oral - 1-2 times per day as needed for severe pain - #20 tablets   -- my next recommendation would be to try methadone given it has an effect against both musculoskeletal and nerve pain but I am leaving the practice thus it would up to your next pain management doctor to try   -- continue tylenol 1000mg three times per day as needed   -- if the right thigh pain returns, please resume Aspercreme (over the counter) 3 times a day on the right thigh      Follow up for office visit Dr Blackmon .      Marilia Jean MD

## 2020-08-13 ENCOUNTER — TELEPHONE (OUTPATIENT)
Dept: PAIN MEDICINE | Facility: CLINIC | Age: 70
End: 2020-08-13

## 2020-08-13 NOTE — TELEPHONE ENCOUNTER
----- Message from Nydia Lewis sent at 8/13/2020  2:49 PM CDT -----  Type:  Sooner Apoointment Request    Caller is requesting a sooner appointment.  Caller declined first available appointment listed below.  Caller will not accept being placed on the waitlist and is requesting a message be sent to doctor.    Name of Caller:  Patient   When is the first available appointment?  9/2  Symptoms:  Referral from Dr Jean  Best Call Back Number:    Additional Information:  per patient Dr Jean [facility]-please advise-thank you

## 2020-08-13 NOTE — TELEPHONE ENCOUNTER
Contacted pt. Pt requesting work in appt with  Offered pt appt 8/20/20 9AM.Pt gladly accepted. All questions answered.//lp

## 2020-08-20 ENCOUNTER — OFFICE VISIT (OUTPATIENT)
Dept: PAIN MEDICINE | Facility: CLINIC | Age: 70
End: 2020-08-20
Payer: MEDICARE

## 2020-08-20 VITALS
WEIGHT: 281.06 LBS | BODY MASS INDEX: 44.11 KG/M2 | HEIGHT: 67 IN | HEART RATE: 70 BPM | SYSTOLIC BLOOD PRESSURE: 113 MMHG | DIASTOLIC BLOOD PRESSURE: 66 MMHG

## 2020-08-20 DIAGNOSIS — M48.062 LUMBAR STENOSIS WITH NEUROGENIC CLAUDICATION: ICD-10-CM

## 2020-08-20 DIAGNOSIS — E11.42 DIABETIC POLYNEUROPATHY ASSOCIATED WITH TYPE 2 DIABETES MELLITUS: ICD-10-CM

## 2020-08-20 DIAGNOSIS — Z79.891 OPIOID USE AGREEMENT EXISTS: ICD-10-CM

## 2020-08-20 DIAGNOSIS — G03.9 ARACHNOIDITIS: Primary | ICD-10-CM

## 2020-08-20 DIAGNOSIS — G90.522 COMPLEX REGIONAL PAIN SYNDROME TYPE 1 OF LEFT LOWER EXTREMITY: ICD-10-CM

## 2020-08-20 DIAGNOSIS — Z79.02 ANTIPLATELET OR ANTITHROMBOTIC LONG-TERM USE: ICD-10-CM

## 2020-08-20 PROCEDURE — 1125F PR PAIN SEVERITY QUANTIFIED, PAIN PRESENT: ICD-10-PCS | Mod: S$GLB,,, | Performed by: ANESTHESIOLOGY

## 2020-08-20 PROCEDURE — 3008F BODY MASS INDEX DOCD: CPT | Mod: CPTII,S$GLB,, | Performed by: ANESTHESIOLOGY

## 2020-08-20 PROCEDURE — 3078F PR MOST RECENT DIASTOLIC BLOOD PRESSURE < 80 MM HG: ICD-10-PCS | Mod: CPTII,S$GLB,, | Performed by: ANESTHESIOLOGY

## 2020-08-20 PROCEDURE — 3078F DIAST BP <80 MM HG: CPT | Mod: CPTII,S$GLB,, | Performed by: ANESTHESIOLOGY

## 2020-08-20 PROCEDURE — 3074F SYST BP LT 130 MM HG: CPT | Mod: CPTII,S$GLB,, | Performed by: ANESTHESIOLOGY

## 2020-08-20 PROCEDURE — 1159F PR MEDICATION LIST DOCUMENTED IN MEDICAL RECORD: ICD-10-PCS | Mod: S$GLB,,, | Performed by: ANESTHESIOLOGY

## 2020-08-20 PROCEDURE — 99205 OFFICE O/P NEW HI 60 MIN: CPT | Mod: S$GLB,,, | Performed by: ANESTHESIOLOGY

## 2020-08-20 PROCEDURE — 3074F PR MOST RECENT SYSTOLIC BLOOD PRESSURE < 130 MM HG: ICD-10-PCS | Mod: CPTII,S$GLB,, | Performed by: ANESTHESIOLOGY

## 2020-08-20 PROCEDURE — 1125F AMNT PAIN NOTED PAIN PRSNT: CPT | Mod: S$GLB,,, | Performed by: ANESTHESIOLOGY

## 2020-08-20 PROCEDURE — 1101F PT FALLS ASSESS-DOCD LE1/YR: CPT | Mod: CPTII,S$GLB,, | Performed by: ANESTHESIOLOGY

## 2020-08-20 PROCEDURE — 3044F PR MOST RECENT HEMOGLOBIN A1C LEVEL <7.0%: ICD-10-PCS | Mod: CPTII,S$GLB,, | Performed by: ANESTHESIOLOGY

## 2020-08-20 PROCEDURE — 99999 PR PBB SHADOW E&M-EST. PATIENT-LVL III: CPT | Mod: PBBFAC,,, | Performed by: ANESTHESIOLOGY

## 2020-08-20 PROCEDURE — 1159F MED LIST DOCD IN RCRD: CPT | Mod: S$GLB,,, | Performed by: ANESTHESIOLOGY

## 2020-08-20 PROCEDURE — 1101F PR PT FALLS ASSESS DOC 0-1 FALLS W/OUT INJ PAST YR: ICD-10-PCS | Mod: CPTII,S$GLB,, | Performed by: ANESTHESIOLOGY

## 2020-08-20 PROCEDURE — 3008F PR BODY MASS INDEX (BMI) DOCUMENTED: ICD-10-PCS | Mod: CPTII,S$GLB,, | Performed by: ANESTHESIOLOGY

## 2020-08-20 PROCEDURE — 99205 PR OFFICE/OUTPT VISIT, NEW, LEVL V, 60-74 MIN: ICD-10-PCS | Mod: S$GLB,,, | Performed by: ANESTHESIOLOGY

## 2020-08-20 PROCEDURE — 3044F HG A1C LEVEL LT 7.0%: CPT | Mod: CPTII,S$GLB,, | Performed by: ANESTHESIOLOGY

## 2020-08-20 PROCEDURE — 99999 PR PBB SHADOW E&M-EST. PATIENT-LVL III: ICD-10-PCS | Mod: PBBFAC,,, | Performed by: ANESTHESIOLOGY

## 2020-08-20 PROCEDURE — 80307 DRUG TEST PRSMV CHEM ANLYZR: CPT

## 2020-08-20 RX ORDER — METHADONE HYDROCHLORIDE 5 MG/1
5 TABLET ORAL 2 TIMES DAILY PRN
Qty: 20 TABLET | Refills: 0 | Status: SHIPPED | OUTPATIENT
Start: 2020-08-20

## 2020-08-20 NOTE — PROGRESS NOTES
This note was completed with dictation software and grammatical errors may exist.    CC:  Back pain, bilateral leg    HPI:  The patient is a 69-year-old man with a history of peripheral arterial disease, type 2 diabetes, cervical and lumbar fusion, left ankle fusion and CRPS of the left lower extremity who presents in referral from Dr. Jean for continued pain.  The patient reports having history of 8 total lumbar surgeries after which she has been dealing with arachnoiditis, bilateral low back and leg pain.  He reports undergoing L4 through S1 PSI Fahrenheit with Dr. Grimes in 2014, then had paddle lead placement of spinal cord stimulator in 2015.  He had worsened pain and then underwent posterior fusion of L2 through S1 and L2 through 4 decompression by Dr. Camp in 2018 after which she had left L4 nerve root injury.  In addition to this he has a left ankle fusion after which he developed left lower leg CRPS.  He actually had a sympathetic to me that never seemed to help with this.  He continues to have pain across the bilateral back and throughout the legs, this last year his right leg pain has been the worst.  He is getting electrical shooting through his legs but the right leg pain is the worst, sometimes up to 12 times a day.  He is very weak in the legs but especially the left leg.  He states that his pain is constant and he spends 90% of his day in bed.  He describes his pain as burning, tingling, deep, numb, sharp, electric and shooting.  It is worsened with just about any type of movement, has great difficulty trying to transfer from his electric scooter to the toilet.  Otherwise, it does not sound like he ever leaves the house, stands most of his day sitting or lying in bed.  He and his wife no longer do any shopping, they are able to get things delivered by shopping online.  He has a child who visits and sometimes brings food as well.  His wife is also mostly bedbound.    Pain intervention history:   He had undergone many injections in the past without relief, does report that the spinal cord stimulator, [place] now [place] did seem to help in the past.  He has not been using this.  He has tried Lyrica, gabapentin, morphine, Percocet, levorphanol, oxycodone without much benefit.  He had undergone intrathecal pump trial with morphine, again with fentanyl with no relief.  He has a paddle lead spinal cord stimulator, [employer].    Antineuropathics:  Currently using Trileptal 300 milligrams twice daily and Lyrica 200 milligrams 3 times daily with slight benefit  NSAIDs:  Takes 1000 milligrams of Tylenol 3 times daily  Physical therapy:  Antidepressants:  Cymbalta 60 milligrams  Muscle relaxers:  Opioids:  He had weaned off of opioids after being on 15 milligrams oxycodone 4 times daily.  He recently since his pain was worsening he was given Dilaudid  Antiplatelets/Anticoagulants:  Aspirin and Plavix        ROS:  He reports double vision, easy bruising, joint stiffness, joint swelling, back pain, difficulty sleeping and loss of balance.    Lab Results   Component Value Date    HGBA1C 6.8 (H) 05/06/2020       Lab Results   Component Value Date    WBC 6.59 05/06/2020    HGB 11.8 (L) 05/06/2020    HCT 36.0 (L) 05/06/2020    MCV 86 05/06/2020     05/06/2020             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    Anticoagulant long-term use     Arthritis     Cervical stenosis of spine     Chronic cervical pain     Chronic lumbar pain     Claudication of both lower extremities     Coronary artery disease     1 stent     Disorder of kidney and ureter     History of right common carotid artery stent placement     HTN (hypertension)     Hyperlipidemia     Lumbar facet arthropathy 02/2018    Lumbar stenosis with neurogenic claudication 02/2018    Narcotic dependence 9/25/2019    Obesity     PVD (peripheral vascular  disease)     stents 1 left; 2 right    Respiratory failure 9/25/2019    RSD lower limb     Left ankle    S/P insertion of spinal cord stimulator     not working    S/P lumbar spinal fusion     multiple       Past Surgical History:   Procedure Laterality Date    ABDOMINAL AORTOGRAPHY N/A 9/30/2019    Procedure: Aortogram, Abdominal;  Surgeon: Kael Woo MD;  Location: [facility] CATH;  Service: Cardiology;  Laterality: N/A;    ANGIOGRAM, CORONARY, WITH LEFT HEART CATHETERIZATION  11/5/2019    Procedure: Angiogram, Coronary, with Left Heart Cath;  Surgeon: iRvera Soto MD;  Location: [facility] CATH LAB;  Service: Cardiology;;    ANGIOPLASTY      Aortagram with Runoff; stent leg    ANKLE SURGERY Left     Multiple surgeries; now fused    APPENDECTOMY      BACK SURGERY      7 total    BRONCHOSCOPY Right 3/19/2019    Procedure: Bronchoscopy;  Surgeon: Antony Coelho Jr., DO;  Location: [facility] ENDO;  Service: Pulmonary;  Laterality: Right;    CAROTID STENT Left     CARPAL TUNNEL RELEASE Bilateral     left twice; right once    CERVICAL FUSION      over 5 levels    COLONOSCOPY  ~2015    normal findings per patient report    CORONARY ANGIOGRAPHY N/A 9/27/2019    Procedure: ANGIOGRAM, CORONARY ARTERY;  Surgeon: Michael Riuz MD;  Location: [facility] CATH;  Service: Cardiology;  Laterality: N/A;    CORONARY STENT PLACEMENT N/A 11/5/2019    Procedure: INSERTION, STENT, CORONARY ARTERY;  Surgeon: Rivera Soto MD;  Location: [facility] CATH LAB;  Service: Cardiology;  Laterality: N/A;    ESOPHAGOGASTRODUODENOSCOPY N/A 3/16/2019    Procedure: EGD (ESOPHAGOGASTRODUODENOSCOPY)-in icu;  Surgeon: Angel Mckeon MD;  Location[facility];  Service: Endoscopy;  Laterality: N/A;  A large amount of food (residue) in the stomach, esophageal ulcer    ESOPHAGOGASTRODUODENOSCOPY Left 3/18/2019    Procedure: EGD (ESOPHAGOGASTRODUODENOSCOPY)- in ICU on drip;  Surgeon: Angel Mckeon MD;  Location: [facility];   Service: Endoscopy;  Laterality: Left; esophageal ulcer, Erythematous mucosa was found in the gastric body, consistent with NG tube trauma    LEFT HEART CATHETERIZATION Left 2019    Procedure: Left heart cath;  Surgeon: Michael Ruiz MD;  [facility];  Service: Cardiology;  Laterality: Left;    LEFT HEART CATHETERIZATION Right 2019    Procedure: Left heart cath;  Surgeon: Kael Woo MD;  Location: [facility] CATH;  Service: Cardiology;  Laterality: Right;    pain injection      Multiple    PERCUTANEOUS TRANSLUMINAL BALLOON ANGIOPLASTY OF CORONARY ARTERY  2019    Procedure: Angioplasty-coronary;  Surgeon: Kael Woo MD;  Location: [facility] CATH;  Service: Cardiology;;    POSTERIOR FUSION LUMBAR SPINE W/ CORPECTOMY      twice in last 18 mos (3/2016)    SPINAL CORD STIMULATOR IMPLANT  implanted 12/8/15    having great pain relief    SYMPATHECTOMY         Social History     Socioeconomic History    Marital status:      Spouse name: Not on file    Number of children: Not on file    Years of education: Not on file    Highest education level: Not on file   Occupational History    Not on file   Social Needs    Financial resource strain: Somewhat hard    Food insecurity     Worry: Often true     Inability: Often true    Transportation needs     Medical: Yes     Non-medical: Yes   Tobacco Use    Smoking status: Former Smoker     Packs/day: 2.00     Years: 45.00     Pack years: 90.00     Types: Cigarettes     Quit date:      Years since quittin.6    Smokeless tobacco: Never Used    Tobacco comment: vapes with nicotine since    Substance and Sexual Activity    Alcohol use: No     Frequency: Never     Binge frequency: Never    Drug use: No    Sexual activity: Not Currently     Partners: Female   Lifestyle    Physical activity     Days per week: 0 days     Minutes per session: 0 min    Stress: To some extent   Relationships    Social connections  "    Talks on phone: Once a week     Gets together: Never     Attends Jewish service: Not on file     Active member of club or organization: No     Attends meetings of clubs or organizations: Never     Relationship status:    Other Topics Concern    Not on file   Social History Narrative    Not on file         Medications/Allergies: See med card    Vitals:    20 0845   BP: 113/66   Pulse: 70   Weight: 127.5 kg (281 lb 1.4 oz)   Height: 5' 7" (1.702 m)   PainSc: 10-Worst pain ever   PainLoc: Back         Physical exam:  Gen: A and O x3, pleasant, well-groomed  Skin: No rashes or obvious lesions  HEENT: PERRLA, no obvious deformities on ears or in canals. Trachea midline.  CVS: Regular rate and rhythm, normal palpable pulses.  Resp:No increased work of breathing, symmetrical chest rise.  Abdomen: Soft, NT/ND.  Musculoskeletal:  Presents in wheelchair, older than stated age, unable to stand  Neuro:  Lower extremities: 5/5 strength bilaterally, hip flexion 4/5 possibly due to pain, left ankle fused unable to dorsiflex or plantar flex.  Reflexes: Patellar 0+, Achilles 0+ bilaterally.  Sensory:  Intact and symmetrical to light touch and pinprick in L2-S1 except left leg decreased to pinprick in nondermatomal distribution scattered throughout most of the left upper leg but allodynia in left lower leg  Left lower leg from the calf down appears brighter purplish, there is allodynia to light touch over the left lower leg.    Imagin12 CT L-spine:  T12-L1:No disc protrusion or extrusion. No central canal stenosis or neuroforaminal stenosis.   L1-L2:There is a broad disc bulge which extends into both neural foramina.  There is bilateral hypertrophic facet arthropathy.  There is, at most, mild overall central canal stenosis.  There is bilateral neuroforaminal stenosis.  L2-L3:There is a broad disc bulge which extends into both neural foramina, left greater than right.  There is bilateral hypertrophic " degenerative facet arthropathy.  No central canal stenosis.  No neuroforaminal stenosis.   L3-L4:There is a grade I retrolisthesis of L3 on L4.  There is uncovering of the intervertebral disc and a superimposed partially calcified broad disc bulge which extends into both neural foramina.  Furthermore, there is an apparent superimposed broad right paracentral disc protrusion.  It is possible that this could theoretically encroach upon the descending right L4 nerve in the right lateral recess.  Please correlate clinically for symptoms referable to the right L4 nerve.  There are postoperative changes of posterior decompression.  There is moderate proximal right neuroforaminal stenosis.  There is severe left neuroforaminal stenosis.  Please correlate clinically for symptoms referable to the left L3 nerve.  L4-L5:There is postoperative change of posterior decompression.  There is probable postoperative scar observed in the operative bed.  No definite central canal stenosis.  The central canal is largely obscured by metal artifact.  There is prominent left facet arthropathy.  There is soft tissue present in the left lateral recess, left neural foramen, and left extraforaminal soft tissues which could relate to scarring or to disc material.  Please correlate clinically for symptoms referable to the left L4 nerve (series 2, image 292).   L5-S1:There is scar tissue present within the operative bed in the vicinity of the patient's decompressive laminectomy.  No central canal stenosis identified.  There is moderate bilateral neuroforaminal stenosis present.    6/4/18 CTA runoff, abd pelvis:  1. Chronic occlusion of the proximal and mid left superficial femoral artery with collateral filling distally.  2. High-grade stenosis and near occlusion of the left popliteal artery.  3. Approximately 50% stenosis of the mid right popliteal artery.  4. Asymmetric muscular atrophy of the left lower extremity.    Assessment:   The patient  is a 69-year-old man with a history of peripheral arterial disease, type 2 diabetes, cervical and lumbar fusion, left ankle fusion and CRPS of the left lower extremity who presents in referral from Dr. Jean for continued pain.      1. Arachnoiditis     2. Diabetic polyneuropathy associated with type 2 diabetes mellitus     3. Lumbar stenosis with neurogenic claudication     4. Complex regional pain syndrome type 1 of left lower extremity     5. Antiplatelet or antithrombotic long-term use     6. Opioid use agreement exists  Pain Clinic Drug Screen         Plan:  1.  We discussed his symptoms, imaging.  It seems like his pain is mostly secondary to arachnoiditis.  We discussed that since he already has a spinal cord stimulator implanted, I will get in touch with the representative to get this reprogrammed and perhaps may need a switch to a new battery.  The device he has had been updated to burst technology so we will see if this helps control some of his worsened bilateral leg pain.  2.  He had been taking Dilaudid with only minimal relief, I will have him try methadone for its affect on neuropathic pain.  At this point I think opioid therapy is reasonable since he is not far from being a hospice patient.  We discussed that he has not been able to get into assisted living but I think we need to look into this further because I am not sure how he and his wife are actually caring for themselves right now.  I am going to have him complete a urine drug screen and sign an opioid agreement.  I will start him at 5 milligrams methadone twice daily and we can increase as needed.  3.  I will have him continue Trileptal 300 milligrams twice daily in addition to Lyrica 200 milligrams 3 times daily.    Thank you for referring this interesting patient, and I look forward to continuing to collaborate in his care.      Greater than 50% of this 60min visit was spent educating and counseling this patient.

## 2020-08-20 NOTE — LETTER
August 20, 2020      Marilia Jean MD  [address] [facility] [address]  [address]  [facility] [address]           [place] [facility]  [address] [place] [address]  Phone: [phone]  Fax: [phone]          Patient: Domingo Vaca   MR Number: [id number]   YOB: 1950   Date of Visit: 8/20/2020       Dear Dr. Marilia Jean:    Thank you for referring Domingo Vaca to me for evaluation. Attached you will find relevant portions of my assessment and plan of care.    If you have questions, please do not hesitate to call me. I look forward to following Domingo Vaca along with you.    Sincerely,    Matthew Blackmon MD    Enclosure  CC:  No Recipients    If you would like to receive this communication electronically, please contact [email or web address] or [phone] to request more information on [employer] Link access.    For providers and/or their staff who would like to refer a patient to [facility], please contact us through our one-stop-shop provider referral line, [facility], at [phone].    If you feel you have received this communication in error or would no longer like to receive these types of communications, please e-mail [email or web address]

## 2020-08-24 LAB
6MAM UR QL: NOT DETECTED
7AMINOCLONAZEPAM UR QL: NOT DETECTED
A-OH ALPRAZ UR QL: NOT DETECTED
ALPRAZ UR QL: NOT DETECTED
AMPHET UR QL SCN: NOT DETECTED
ANNOTATION COMMENT IMP: NORMAL
ANNOTATION COMMENT IMP: NORMAL
BARBITURATES UR QL: NOT DETECTED
BUPRENORPHINE UR QL: NOT DETECTED
BZE UR QL: NOT DETECTED
CARBOXYTHC UR QL: NOT DETECTED
CARISOPRODOL UR QL: NOT DETECTED
CLONAZEPAM UR QL: NOT DETECTED
CODEINE UR QL: NOT DETECTED
CREAT UR-MCNC: 143.8 MG/DL (ref 20–400)
DIAZEPAM UR QL: NOT DETECTED
ETHYL GLUCURONIDE UR QL: NOT DETECTED
FENTANYL UR QL: NOT DETECTED
HYDROCODONE UR QL: NOT DETECTED
HYDROMORPHONE UR QL: PRESENT
LORAZEPAM UR QL: NOT DETECTED
MDA UR QL: NOT DETECTED
MDEA UR QL: NOT DETECTED
MDMA UR QL: NOT DETECTED
ME-PHENIDATE UR QL: NOT DETECTED
MEPERIDINE UR QL: NOT DETECTED
METHADONE UR QL: NOT DETECTED
METHAMPHET UR QL: NOT DETECTED
MIDAZOLAM UR QL SCN: NOT DETECTED
MORPHINE UR QL: NOT DETECTED
NORBUPRENORPHINE UR QL CFM: NOT DETECTED
NORDIAZEPAM UR QL: NOT DETECTED
NORFENTANYL UR QL: NOT DETECTED
NORHYDROCODONE UR QL CFM: NOT DETECTED
NOROXYCODONE UR QL CFM: NOT DETECTED
NOROXYMORPHONE: NOT DETECTED
OXAZEPAM UR QL: NOT DETECTED
OXYCODONE UR QL: NOT DETECTED
OXYMORPHONE UR QL: NOT DETECTED
PATHOLOGY STUDY: NORMAL
PCP UR QL: NOT DETECTED
PHENTERMINE UR QL: NOT DETECTED
PROPOXYPH UR QL: NOT DETECTED
SERVICE CMNT-IMP: NORMAL
TAPENTADOL UR QL SCN: NOT DETECTED
TAPENTADOL-O-SULF: NOT DETECTED
TEMAZEPAM UR QL: NOT DETECTED
TRAMADOL UR QL: NOT DETECTED
ZOLPIDEM UR QL: NOT DETECTED

## 2020-09-09 ENCOUNTER — TELEPHONE (OUTPATIENT)
Dept: FAMILY MEDICINE | Facility: CLINIC | Age: 70
End: 2020-09-09

## 2020-09-09 NOTE — TELEPHONE ENCOUNTER
----- Message from Kylie Ibrahim sent at 9/9/2020  9:51 AM CDT -----  Contact: Spouse/Debra [phone]  Called to notify you that the above patient passed away.    Date of Death: 08/27/2020 at his home    [email or web address] has been notified and all future appts have been cancelled.    Thank You

## 2020-09-09 NOTE — TELEPHONE ENCOUNTER
Yes.  The patient contact us for the death of her  when this happened.  My condolences for the wife.  Thank you

## 2021-01-01 NOTE — TELEPHONE ENCOUNTER
----- Message from Samantha Carbajal MA sent at 6/24/2020  3:02 PM CDT -----  Regarding: PT  Duglas with PT is requesting orders to extend PT physical Therapy for at least 1-2 weeks/ with nurse  Duglas Call Back # [phone]  Fax : [facility]  Please call back to confirm .      Dr. Bui

## 2021-06-24 NOTE — TELEPHONE ENCOUNTER
----- Message from Marilia Jean MD sent at 8/12/2020  9:04 AM CDT -----  Please transition care from Miranda to Dr Blackmon - multiple pain related conditions including failed back, arachnoiditis, CRPS, s/p stimulator. Doing very poorly, please see in Sept if possible.    Performing Laboratory: 0

## 2022-01-14 NOTE — PROGRESS NOTES
Neurosurgery Outpatient Follow Up    Patient ID: Domingo Vaca is a 67 y.o. male.    Chief Complaint   Patient presents with    Lumbar Spine Pain (L-Spine)     Long history of chronic low back pain that has progressively increased over the last 3-4 months. Reports pain and weakness to his BLE to feet. Right is far worse than left. Pain is increased by walking Slightly decreased by sitting. Denies bladder or bowel dysfunction. Denies recent hx PT. He has many ESIs with minimum short-term improvement.            Review of Systems   Constitutional: Negative for activity change, appetite change, chills, fever and unexpected weight change.   HENT: Negative for tinnitus, trouble swallowing and voice change.    Respiratory: Negative for apnea, cough, chest tightness and shortness of breath.    Cardiovascular: Negative for chest pain and palpitations.   Gastrointestinal: Negative for constipation, diarrhea, nausea and vomiting.   Genitourinary: Negative for difficulty urinating, dysuria, frequency and urgency.   Musculoskeletal: Positive for arthralgias, back pain, gait problem, joint swelling, myalgias, neck pain and neck stiffness.   Skin: Negative for wound.   Neurological: Positive for weakness and numbness. Negative for dizziness, tremors, seizures, facial asymmetry, speech difficulty, light-headedness and headaches.   Psychiatric/Behavioral: Positive for sleep disturbance. Negative for confusion and decreased concentration.       Past Medical History:   Diagnosis Date    Anticoagulant long-term use     Arthritis     Cervical stenosis of spine     Chronic cervical pain     Chronic lumbar pain     Claudication of both lower extremities     HTN (hypertension)     Hyperlipidemia     Obesity     PVD (peripheral vascular disease)     RSD lower limb     Left ankle     Social History     Social History    Marital status:      Spouse name: N/A    Number of children: N/A    Years of education: N/A  Refill request or Elavil 50mg 1 tab at hs #90 last filled 7/16/21 for #90 with 1 refill. Rx ok per Dr Saldivar and sent next appt 1/17/22    "    Occupational History    Not on file.     Social History Main Topics    Smoking status: Former Smoker     Packs/day: 2.00     Years: 45.00     Quit date: 9/1/2011    Smokeless tobacco: Never Used    Alcohol use No    Drug use: No    Sexual activity: Not on file     Other Topics Concern    Not on file     Social History Narrative    No narrative on file     Family History   Problem Relation Age of Onset    No Known Problems Mother     Heart disease Father     Early death Father      Review of patient's allergies indicates:  No Known Allergies    Current Outpatient Prescriptions:     acetaminophen (TYLENOL) 500 mg Cap, Take 1,000 mg by mouth continuous prn. , Disp: , Rfl:     aspirin 81 mg TbEF, Take 1 tablet by mouth every evening., Disp: , Rfl:     chlorthalidone (HYGROTEN) 25 MG Tab, TAKE 1 TABLET EVERY DAY, Disp: 90 tablet, Rfl: 0    citalopram (CELEXA) 20 MG tablet, TAKE 1 TABLET EVERY DAY, Disp: 90 tablet, Rfl: 0    clopidogrel (PLAVIX) 75 mg tablet, TAKE 1 TABLET EVERY DAY, Disp: 90 tablet, Rfl: 1    gabapentin (NEURONTIN) 600 MG tablet, TAKE 2 TABLETS THREE TIMES DAILY, Disp: 540 tablet, Rfl: 1    losartan (COZAAR) 100 MG tablet, TAKE 1 TABLET ONE TIME DAILY, Disp: 90 tablet, Rfl: 1    multivit-min-FA-lycopen-lutein 0.4-300-250 mg-mcg-mcg Tab, Take 1 tablet by mouth once daily., Disp: , Rfl:     oxycodone-acetaminophen (PERCOCET)  mg per tablet, TK1 T PO Q 6 H PRN P, Disp: , Rfl: 0    simvastatin (ZOCOR) 40 MG tablet, Take 1 tablet (40 mg total) by mouth every evening., Disp: 90 tablet, Rfl: 2  Blood pressure (!) 165/75, pulse 84, height 5' 7" (1.702 m), weight 122 kg (268 lb 15.4 oz).      Neurologic Exam     Mental Status   Oriented to person, place, and time.   Speech: speech is normal     Cranial Nerves     CN III, IV, VI   Pupils are equal, round, and reactive to light.    Motor Exam     Strength   Strength 5/5 throughout.     Gait, Coordination, and Reflexes     Reflexes "   Right brachioradialis: 1+  Left brachioradialis: 1+  Right biceps: 1+  Left biceps: 1+  Right triceps: 1+  Left triceps: 1+  Right patellar: 1+  Left patellar: 1+  Right achilles: 0  Left achilles: 0      Physical Exam   Constitutional: He is oriented to person, place, and time. He appears well-developed and well-nourished.   Morbid central obesity   HENT:   Head: Normocephalic and atraumatic.   Eyes: Pupils are equal, round, and reactive to light.   Neck: Normal range of motion. Neck supple.   Cardiovascular: Normal pulses.    Pulmonary/Chest: Effort normal.   Musculoskeletal: Normal range of motion. He exhibits no edema.   Neurological: He is oriented to person, place, and time. He has normal strength. He displays atrophy. He displays no tremor. A sensory deficit is present. He exhibits normal muscle tone. He displays no seizure activity. Coordination and gait abnormal. GCS eye subscore is 4. GCS verbal subscore is 5. GCS motor subscore is 6.   Reflex Scores:       Tricep reflexes are 1+ on the right side and 1+ on the left side.       Bicep reflexes are 1+ on the right side and 1+ on the left side.       Brachioradialis reflexes are 1+ on the right side and 1+ on the left side.       Patellar reflexes are 1+ on the right side and 1+ on the left side.       Achilles reflexes are 0 on the right side and 0 on the left side.  Antalgic gait limps with right leg  Loss of sensation and tingling right leg  Diminished vibration and proprioception both ankles  Cannot toe walk or heel walk         Skin: Skin is warm, dry and intact.   Psychiatric: He has a normal mood and affect. His speech is normal and behavior is normal. Judgment and thought content normal.   Nursing note and vitals reviewed.      Provider dictation:  The patient is a 67-year-old  male with a previous L4-S1 PSIF with Dr Grimes three years ago followed by SCS placement with Dr Navarro at [facility] 1.5 years ago. He has worsing recurrent low back  pain and right leg weakness and pain with walking. He is here for a second opinion having been scheduled for surgery on March 6 with Dr Grimes. An whole spine CT was obtained along with  X-rays and he is here for follow-up of those results. 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 He has had many FELIZ, to which he failed to respond, but he admits moderate pain relief after SCS implant.    He reports severe low back pain, radiating down his thighs and into the calves bilaterally, but worse on the right. He has no pinky weakness or bladder retention. He complains of axial pain interfering with his sleeping and severe low back pain with walking or standing, radiating to both legs. The pain leg pain is alleviated by rest, sitting, and bending forward. The axial pain is not alleviated by anything except narcotics. His pain increases throughout the day with activity.     His Oswestry Disability Index score is 88% and his PHQ is 17.     A recent cervical CT  reveals C2-6 solid fusion and a disc herniation at C6-7 without central canal or foraminal stenosis. I have reviewed his lumbar CT myelogram which demonstrates arachnoiditis and adjacent level disease at L3/4 and L2/3, above the previous L4-S1 fusion. There is facet hypertrophy at L3/4 with lateral recess and foramina stenosis. There is severe right side facet disease at with neural impingement.      In conclusion this male with chronic back pain and a previous fusion has typical findings of adjacent level disease with axial pain and neurogenic claudication. In the setting of flat back syndrome, congenitally narrow canal, morbid obesity, and CRPS he will be hard to fix. I have had an ample discussion with patient regarding the assessment and we have agreed that he will undergo a lumbar decompression and extension of fusion spanning L2 to S1. He has been counseled about weight loss and exercise in the  preoperative period.     Visit Diagnosis:  Osteoarthritis of both knees, unspecified osteoarthritis type    PAD (peripheral artery disease)    Peripheral polyneuropathy    Spinal cord stimulator status    Morbid obesity due to excess calories [E66.01]    Common peroneal neuropathy, unspecified laterality    Cervical stenosis of spine    Lumbar stenosis with neurogenic claudication    Adjacent segment disease with spinal stenosis    Lumbar facet arthropathy    S/P lumbar spinal fusion    Left ankle joint deformity    S/P cervical spinal fusion

## 2022-04-28 NOTE — PROGRESS NOTES
Patient:   Flora Jaffe            MRN: [id number]            FIN: [id number]               Age:   45 years     Sex:  Female     :  1976   Associated Diagnoses:   Calculous cholecystitis   Author:   Joaquin Thakkar MD      Basic Information   Admit information:  Presents for Lap Karlie .    Source of history:  Self, Medical record.    Present at bedside:  Medical personnel.       Health Status   Allergies:    Allergic Reactions (Selected)  No Known Allergies   Current medications:    No qualifying data available        Histories   Past Medical History:    Resolved  Kidney stones (592.0):  Resolved.   Social History        Social & Psychosocial Habits    Alcohol  2017  Use: Past    10/13/2021  Use: Current    Type: Beer    Frequency: 1-2 times per year    Employment/School  2014  Status: Employed    10/13/2021  Status: Employed    Description: Medicaid    Highest education: University degree(s)    Substance Use  2014 Risk Assessment: Denies Substance Abuse    10/13/2021  Use: Never    Tobacco  2014 Risk Assessment: Denies Tobacco Use    11/10/2015  Use: Never smoker    10/13/2021  Use: Former smoker, quit more    Patient Wants Consult For Cessation Counseling N/A    Abuse/Neglect  10/13/2021  SHX Any signs of abuse or neglect No    Feels unsafe at home: No    Safe place to go: Yes    Spiritual/Cultural  10/13/2021  Yazdanism Preference Islam, Alevism  .        Physical Examination   General:  Alert and oriented.    HENT:  Normocephalic.    Neck:  Supple.    Respiratory:  Lungs are clear to auscultation.    Cardiovascular:  Normal rate.    Gastrointestinal:  Normal, Abdomen.    Genitourinary:  No costovertebral angle tenderness.    Musculoskeletal:  Normal range of motion.    Integumentary:  Warm.    Neurologic:  Alert.       Review / Management   Results review:     No qualifying data available.       Impression and Plan   Diagnosis     Calculous cholecystitis  Date of service: 6/9/2020  Referring provider: No ref. provider found    Subjective:      Chief complaint: No chief complaint on file.       Patient ID: Domingo Vaca is a 69 y.o. gentleman with lumbar arachnoiditis, peripheral artery disease, type 2 diabetes, history of cervical fusion, history of lumbar fusion, history of left ankle fusion, CRPS of left lower extremity who is presenting for follow up of pain     History of Present Illness    INTERVAL HISTORY - 6/9/2020    The patient location is: home  The chief complaint leading to consultation is: pain     Visit type: audiovisual    Face to Face time with patient: 25  30 minutes of total time spent on the encounter, which includes face to face time and non-face to face time preparing to see the patient (eg, review of tests), Obtaining and/or reviewing separately obtained history, Documenting clinical information in the electronic or other health record, Independently interpreting results (not separately reported) and communicating results to the patient/family/caregiver, or Care coordination (not separately reported).     Each patient to whom he or she provides medical services by telemedicine is:  (1) informed of the relationship between the physician and patient and the respective role of any other health care provider with respect to management of the patient; and (2) notified that he or she may decline to receive medical services by telemedicine and may withdraw from such care at any time.    The last visit was approximately 2 months ago, he was doing very poorly, I instructed him to stop citalopram and I raised his duloxetine to 60 mg daily.  We also tried again to switch is gabapentin to Lyrica. I also recommended capsaicin TID to the right thigh    Today he reports he is better. The capsaicin has helped his right thigh and this nerve pain is now gone, and despite stopping the capsaicin, the pain has not return for the past 3 weeks.  He continues to  ([id number]-FB K80.10).       Lap Karlie    have falls - about 3-4 since last visit. His legs continue to become weaker. He is taking a few steps to his commode. He uses a walker. His leg pain will be severe if he is on his legs for more than a few minutes. He is able to sit longer more comfortably than before with the thigh pain being gone.  Despite the changes we made to his Lyrica and his duloxetine, he feels that overall the leg pain is unchanged.    His wife has recently been hospitalized 3 times with heart failure, kidney failure, profuse nose bleeding.    We discussed starting physical therapy back up.  Unfortunately he is able to tolerate very little physical therapy due to his pain.  We tried aqua therapy earlier in the year however even just getting into the pool was very painful for him.  He does think that he could attempt bedside physical therapy at home.    He has misunderstood the instructions and is taking both gabapentin 600 mg t.i.d. and Lyrica 200 mg t.i.d..    INTERVAL HISTORY - 2020    The patient location is: home   The chief complaint leading to consultation is: pain   Visit type: audiovisual  Total time spent with patient: 15 min   Each patient to whom he or she provides medical services by telemedicine is:  (1) informed of the relationship between the physician and patient and the respective role of any other health care provider with respect to management of the patient; and (2) notified that he or she may decline to receive medical services by telemedicine and may withdraw from such care at any time.    At the last visit 3 months ago he was doing poorly. I referred him to aquatherapy ([facility]). In the interim he had problems getting the Lyrica covered so we changed back to gabaepentin 600mg TID. He was previously using the Pf[employer] patient assistance program for the Lyrica but when that , he reports some conditions of the program changed. It was not affordable through "[employer]" but he may be able to get the generic  "from [employer]. The Trleptal is more affordable for retail price through [employer]. The duloxetine is free at [facility]    Today he reports he is much worse. He has had to lay in bed flat for last 5 weeks as he has had nerve pain in the right thigh from the groin to the knee. Only thing that relieves it is to lay flat. If he sits up it is very painful (9/10). It is even worse if he sits on the commode (10/10). This pain is associated with numbness. He reports the strength in the right leg is the same.    He is taking trileptal 600mg BID. He tried taking an extra 300mg mid-day that did not help and caused him some vision changes. Since lowering the dose to 600mg BID that vision change resolved. It is still working very well for his arachnoiditis pain.    He has been on the gabapentin for 4 weeks. It is not helping at all. He is taking 600mg TID. He agreeable to trying Lyrica again.     He reports he has been on celexa for 8 or more years and that was for "heart health" and he does not have depression or anxiety.      INTERVAL HISTORY - 1/31/2020     Mr. Caroline Hand was last seen 3 months ago.  At that point we had him try various Trileptal doses.  He reports that at 900 mg twice a day his vision was blurred so he went back to 600 twice a day which resolved the blurred vision and it does provide significant relief of the pain.    Today he reports he has little worse.  Specifically there is increasing weakness, leg pain, and numbness in his legs. He has been relying more on his power wheelchair.  He can only take a few steps before he has pain. He feels that the left leg is almost useless.  Whereas previously he was numb in the thigh he is now numb below the knee into the calf region.  The right leg is hurting more, not a shock-like pain but a deep pain. The Trileptal 600 b.i.d. does provided significant relief to the neuralgic pain. He fell 5 times in the last 6 weeks due to his legs giving way.    He feels that his memory " has been poor and his concentration has been poor.    Recently he has had some difficulty swallowing, as of the last 3 months, he could not have an endoscopy given that he was recently placed on Plavix after he had a stent, thus he had an EGD which showed pooling in the vallecula but no other abnormalities.  It was suggested to him that may be this has something to do with the anterior fusion.    INTERVAL HISTORY - 10/30/19     The last visit was 1 month ago at which point I started Trileptal.  In the interim he was admitted to [facility] from 9/26/19 to 10/1/19 for acute respiratory early failure, aspiration pneumonia, over narcosis, placement of drug eluting stent.  During the hospitalization his opiate regimen was changed.  Afterwards he message me that he was out of narcotics, they had not been helping anyway, we mutually decided to stop them.  He has been off opiates for 1 month and has been doing very well.  He reports there was no worsening of pain for doing so.  In the hospital the Trileptal was raise to 600 b.i.d. any reports it is helping 20% with his neuralgic pain down the legs. He is tolerating it without any side effects. He reports he does not think the Lyrica 200 mg t.i.d. is helping.    INTERVAL HISTORY - 9/24/19     He was last seen in the clinic approximately 5-6 months ago.     His chronic pain history is complicated, and in my opinion is representation of lumbar stenosis with neurogenic claudication, arachnoiditis, complex regional pain syndrome, diabetic polyneuropathy, facet arthropathy.  I made several recommendations for treating arachnoiditis pain. He was following with Dr. Lazaro Orantes.    In the interim, Dr. Orantes tried to rotate to Nucynta which was denied by his insurance.    He is currently on Lyrica 200 mg t.i.d..  In August Em Orantes raised his oxycodone to 15 mg 4 times a day.  He has not had any further procedures.    Today he is much worse. He reports his pain is progressing. He is  unable to walk, using wheelchair for this visit. At home he ambulates short distances with extreme pain, he reports Dr. Iqbal is trying to order a power wheelchair for this purpose.  Otherwise he has a walker.    He is in pain 100% of the time.  4-5 times per day he will have severe, sudden, episodes of stabbing and burning from the buttocks to the toes in the right leg, and the knee to the toes in the left leg.  The right buttocks is constantly sore.  Afterwards there is a remaining soreness in the legs.    The oxycodone 15mg is not helping at all, even when he takes 2-3 in a span of a couple hours.     He has developed mild urinary incontinence.    His current pain score is 10.       ORIGINAL PAIN HISTORY - 4/4/19   Age at onset and course over time:  He has a long history of chronic low back pain due to flat back, congenitally narrow canal. He had a previous L4-S1 PSIF with Dr Grimes in 2014 followed by SCS placement with Dr Navarro at [facility] in 2015. He has had 7 surgeries total. He had numerous epidural steroids in the past with long-term failure and he was only getting modest relief after the spinal cord stimulator.  Prior to surgery the pain was in the low back, radiating down his thighs into the calves bilaterally worse on the right.  He had no weakness or urinary symptoms.  Pain was interfering with his sleep, sitting, ambulation.    The pain became worse in late 2017. He had a posterior fusion at L2-S1 and the L2-4 decompression with Dr. Camp on 03/06/2018.  Post-op there was immediate left leg weakness and numbness. On exam in the hospital, there was absence of left patellar reflex and weak left knee extension more than left hip flexion. I thought this was a post-op femoral neuropathy from abdominal compression during surgery. Per Dr. Camp's note, there was intra-op left L4 nerve root injury due to scar tissue dissection.    In May 2018 the right leg pain returned suddenly. He had a NCS/EMG 5/24/18  with Dr. Joyner which showed severe incomplete L4 radiculopathy and bilateral chronic S1 radiculopathy.     He did have bilateral stents in the lower extremities in 2016 so a CTA was done to check the patency.  The stents were patent.  There was residual peripheral arterial occlusive disease in the right common femoral artery and the right popliteal artery.  The left superficial femoral artery was occluded. Dr. Arroyo felt that this anatomically could not explain his pain.      Within the last year his morphine went from 30mg daily to 60mg daily. This did nothing. He consulted with Dr. Lazaro Orantes and had an intrathecal morphine trial. This was ineffective. Then tried dilaudid intrathecally without effect, I do not know the doses.      Left leg is numb from hip to knee. There is a burning pain in the left knee. He reports he can handle this, the problem is his right leg which has excruciating pain from the hip to the ankle. Muscles feel on fire. He had this before the surgery but now it feels more muscular. He reports that before, his muscles were not giving out on him.     He was admitted to [facility] with acute upper GI bleed causing hypovolemic and possible septic shock. Needed pressors and blood products. He had a GI ulcer.     Back pain is manageable with the stimulator.  The stimulator does also cover the left lower extremity for the CRPS.    Location:  Right buttocks, down the hamstring, into the calf and lateral ankle  Radiation:    Quality: numb burning type pain feels like a constant patricia horse pain   Severity: 9  Duration: constant with escalations after 5-10 min of walking   Frequency: daily   Alleviated by: laying down  Exacerbated by: sitting is better than walking, but both hurt. He has to sit down for a while to make it go away.   Associated with: right leg buckling; no right leg numbness, no swelling, no temperature change, no bowel or bladder  Sleep habits:    Current acute treatment:  Tylenol 1000mg  TID   (plavix)    Current prevention:  Trileptal 600 b.i.d. ($48 cash price [employer])  Gabapentin 600mg TID - no relief but $0 at [facility]  Lyrica 200 mg t.i.d. ($27 good rx [facility])  duloxetine 60mg daily - started 3/28/19, raised 4/2020    Previously tried:   Oxycodone 15 mg #120 per month  -- levorphanol 2mg - no response   -- morphine ER 30mg BID   -- gabapentin 1200mg TID - ineffective  -- percocet 10mg/325mg - 1.5 - 2 hours of relief only; takes 3 to 4 per day   Lyrica 200 mg 3 times per day - effective but cost prohibitive     Procedural history:       Review of patient's allergies indicates:   Allergen Reactions    Glipizide Rash     Current Outpatient Medications   Medication Sig Dispense Refill    acetaminophen (TYLENOL) 500 MG tablet Take 1,000 mg by mouth every 6 (six) hours as needed for Pain.      amLODIPine (NORVASC) 5 MG tablet Take 1 tablet (5 mg total) by mouth every evening. 90 tablet 3    aspirin (ECOTRIN) 81 MG EC tablet Take 1 tablet (81 mg total) by mouth once daily. for 14 days 14 tablet 0    clopidogrel (PLAVIX) 75 mg tablet Take 1 tablet (75 mg total) by mouth once daily. 90 tablet 3    DULoxetine (CYMBALTA) 60 MG capsule Take 1 capsule (60 mg total) by mouth once daily. 90 capsule 3    metFORMIN (GLUCOPHAGE-XR) 500 MG XR 24hr tablet Take one tablets once daily with breakfast. 90 tablet 3    metoprolol tartrate (LOPRESSOR) 25 MG tablet Take 1 tablet (25 mg total) by mouth 2 (two) times daily. 180 tablet 1    olmesartan (BENICAR) 40 MG tablet Take 1 tablet (40 mg total) by mouth once daily. 90 tablet 3    OXcarbazepine (TRILEPTAL) 300 MG Tab Take 2 tablets (600 mg total) by mouth 2 (two) times daily. 360 tablet 3    pregabalin (LYRICA) 200 MG Cap Take 1 capsule (200 mg total) by mouth 3 (three) times daily. 90 capsule 5    simvastatin (ZOCOR) 40 MG tablet Take 1 tablet (40 mg total) by mouth every evening. 90 tablet 3    tamsulosin (FLOMAX) 0.4 mg Cap Take 1 capsule (0.4 mg  total) by mouth once daily. 90 capsule 1     No current facility-administered medications for this visit.        Past Medical History  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    Anticoagulant long-term use     Arthritis     Cervical stenosis of spine     Chronic cervical pain     Chronic lumbar pain     Claudication of both lower extremities     Coronary artery disease     1 stent     Disorder of kidney and ureter     History of right common carotid artery stent placement     HTN (hypertension)     Hyperlipidemia     Lumbar facet arthropathy 02/2018    Lumbar stenosis with neurogenic claudication 02/2018    Narcotic dependence 9/25/2019    Obesity     PVD (peripheral vascular disease)     stents 1 left; 2 right    Respiratory failure 9/25/2019    RSD lower limb     Left ankle    S/P insertion of spinal cord stimulator     not working    S/P lumbar spinal fusion     multiple       Past Surgical History  Past Surgical History:   Procedure Laterality Date    ABDOMINAL AORTOGRAPHY N/A 9/30/2019    Procedure: Aortogram, Abdominal;  Surgeon: Kael Woo MD;  Location: [facility] CATH;  Service: Cardiology;  Laterality: N/A;    ANGIOGRAM, CORONARY, WITH LEFT HEART CATHETERIZATION  11/5/2019    Procedure: Angiogram, Coronary, with Left Heart Cath;  Surgeon: Rivera Soto MD;  Location: [facility] CATH LAB;  Service: Cardiology;;    ANGIOPLASTY      Aortagram with Runoff; stent leg    ANKLE SURGERY Left     Multiple surgeries; now fused    APPENDECTOMY      BACK SURGERY      7 total    BRONCHOSCOPY Right 3/19/2019    Procedure: Bronchoscopy;  Surgeon: Antony Coelho Jr. DO;  Location: [facility] ENDO;  Service: Pulmonary;  Laterality: Right;    CAROTID STENT Left     CARPAL TUNNEL RELEASE Bilateral     left twice; right once    CERVICAL FUSION      over 5 levels    COLONOSCOPY  ~2015    normal findings  per patient report    CORONARY ANGIOGRAPHY N/A 9/27/2019    Procedure: ANGIOGRAM, CORONARY ARTERY;  Surgeon: Michael Ruiz MD;  Location: [facility] CATH;  Service: Cardiology;  Laterality: N/A;    CORONARY STENT PLACEMENT N/A 11/5/2019    Procedure: INSERTION, STENT, CORONARY ARTERY;  Surgeon: Rivera Soto MD;  Location: [facility] CATH LAB;  Service: Cardiology;  Laterality: N/A;    ESOPHAGOGASTRODUODENOSCOPY N/A 3/16/2019    Procedure: EGD (ESOPHAGOGASTRODUODENOSCOPY)-in icu;  Surgeon: Angel Mckeon MD;  Location: [facility] ENDO;  Service: Endoscopy;  Laterality: N/A;  A large amount of food (residue) in the stomach, esophageal ulcer    ESOPHAGOGASTRODUODENOSCOPY Left 3/18/2019    Procedure: EGD (ESOPHAGOGASTRODUODENOSCOPY)- in ICU on drip;  Surgeon: Angel Mckeon MD;  Location: [facility] ENDO;  Service: Endoscopy;  Laterality: Left; esophageal ulcer, Erythematous mucosa was found in the gastric body, consistent with NG tube trauma    LEFT HEART CATHETERIZATION Left 9/27/2019    Procedure: Left heart cath;  Surgeon: Michael Ruiz MD;  Location: [facility] CATH;  Service: Cardiology;  Laterality: Left;    LEFT HEART CATHETERIZATION Right 9/30/2019    Procedure: Left heart cath;  Surgeon: Kael Woo MD;  Location: [facility] CATH;  Service: Cardiology;  Laterality: Right;    pain injection      Multiple    PERCUTANEOUS TRANSLUMINAL BALLOON ANGIOPLASTY OF CORONARY ARTERY  9/30/2019    Procedure: Angioplasty-coronary;  Surgeon: Kael Woo MD;  Location: [facility] CATH;  Service: Cardiology;;    POSTERIOR FUSION LUMBAR SPINE W/ CORPECTOMY      twice in last 18 mos (3/2016)    SPINAL CORD STIMULATOR IMPLANT  implanted 12/8/15    having great pain relief    SYMPATHECTOMY         Family History  Family History   Problem Relation Age of Onset    No Known Problems Mother     Heart disease Father     Early death Father 56        MI    Glaucoma Neg Hx     Colon cancer Neg Hx     Colon polyps Neg  Hx     Crohn's disease Neg Hx     Esophageal cancer Neg Hx     Stomach cancer Neg Hx     Ulcerative colitis Neg Hx        Social History  Social History     Socioeconomic History    Marital status:      Spouse name: Not on file    Number of children: Not on file    Years of education: Not on file    Highest education level: Not on file   Occupational History    Not on file   Social Needs    Financial resource strain: Somewhat hard    Food insecurity:     Worry: Often true     Inability: Often true    Transportation needs:     Medical: Yes     Non-medical: Yes   Tobacco Use    Smoking status: Former Smoker     Packs/day: 2.00     Years: 45.00     Pack years: 90.00     Types: Cigarettes     Last attempt to quit: 2009     Years since quittin.4    Smokeless tobacco: Never Used    Tobacco comment: vapes with nicotine since    Substance and Sexual Activity    Alcohol use: No     Frequency: Never     Binge frequency: Never    Drug use: No    Sexual activity: Not Currently     Partners: Female   Lifestyle    Physical activity:     Days per week: 0 days     Minutes per session: 0 min    Stress: To some extent   Relationships    Social connections:     Talks on phone: Once a week     Gets together: Never     Attends Synagogue service: Not on file     Active member of club or organization: No     Attends meetings of clubs or organizations: Never     Relationship status:    Other Topics Concern    Not on file   Social History Narrative    Not on file        Review of Systems  14-point review of systems as follows:   No check yomi indicates NEGATIVE response   Constitutional: [] weight loss, [] change to appetite   Eyes: [] change in vision, [] double vision   Ears, nose, mouth, throat: [] frequent nose bleeds, [] ringing in the ears   Respiratory: [] cough, [] wheezing   Cardiovascular: [] chest pain, [] palpitations   Gastrointestinal: [] jaundice, [] nausea/vomiting    Genitourinary: [] incontinence, [] burning with urination   Hematologic/lymphatic: [] easy bruising/bleeding, [] night sweats   Neurological: [x] numbness, [x] weakness   Endocrine: [] fatigue, [] heat/cold intolerance   Allergy/Immunologic: [] fevers, [] chills   Musculoskeletal: [x] muscle pain, [x] joint pain   Psychiatric: [] thoughts of harming self/others, [] depression   Integumentary: [] rashes, [] sores that do not heal     Objective:        There were no vitals filed for this visit.  There is no height or weight on file to calculate BMI.     6/9/2020  The patient is sitting upright, he appears in good spirits      4/4/19   Constitutional: appears in no acute distress, well-developed, well-nourished     Eyes: normal conjunctiva, PERRLA     Ears, nose, mouth, throat: external appearance of ears and nose normal, hearing intact     Cardiovascular: regular rate and rhythm, no murmurs appreciated    Respiratory: unlabored respirations, breath sounds normal bilaterally    Gastrointestinal: no visible abdominal masses, no guarding, no visible hernia    Musculoskeletal:   All muscle groups of the upper extremities are 5/5  The right leg is without atrophy  The left leg shows muscle atrophy in the quads and below the knee  The left leg is with mottling below the knee, slight rubor in the foot, mild swelling of the foot, several healed ulcers, portions of the left leg or cooler than the right.  This is his baseline CRPS.  With full effort he does demonstrate short durations of full strength in all lower extremity muscle groups bilaterally except for the left ankle which is surgically fused  His gait is antalgic    Psychiatric: normal judgment and insight. Oriented to person, place, and time.     Neurologic:   Cortical functions: recent and remote memory intact, normal attention span and concentration, speech fluent, adequate fund of knowledge   Cranial nerves: EOMI, symmetric facial strength  Reflexes: 2+ in the  upper and lower extremities, no Rojo  Sensation:    -- There is reduced sensation to temperature in the left C7 dermatome  -- there is loss of light touch, temperature, pinprick sensation in the entire left lower extremity without a dermatomal preference, proprioception and vibration are absent on left  -- there is preservation of light touch, temperature, pinprick in the entire right leg except for the dorsum of the foot.  Vibration is slightly reduced at the right toe, proprioception intact on the right  Coordination: normal    Data Review:     I have personally reviewed the referring provider's notes, labs, & imaging made available to me today.     RADIOLOGY STUDIES:  I have personally reviewed the pertinent images performed.     Results for orders placed or performed during the hospital encounter of 03/16/19   CT Head Without Contrast    Narrative    EXAMINATION:  CT HEAD WITHOUT CONTRAST    CPT: 18920    CLINICAL HISTORY:  Confusion/delirium, altered LOC, unexplained;Syncope/fainting;.    TECHNIQUE:  Axial CT slices through the head were obtained without the administration of contrast.  Sagittal and coronal reconstructions were performed.  Automated exposure control was utilized.  Total DLP is approximately 674 mGy cm.    COMPARISON:  None available.    FINDINGS:  Mild-to-moderate generalized involutional changes are seen.  No evidence of acute intracranial hemorrhage, mass effect, midline deviation, hydrocephalus, or abnormal extra-axial fluid collection is visualized.  There appears to be  periventricular and deep white matter the hypoattenuation which is nonspecific, but is most commonly associated with chronic microvascular ischemic changes.  No evidence of acute large vessel territory ischemia/infarction is appreciated.  MRI with diffusion-weighted imaging is more sensitive in the assessment of acute ischemia/infarction.  The basilar cisterns are preserved. Minimal mucosal thickening in the lateral left  maxillary sinus is seen.  The mastoid air cells appear to be grossly clear.  No acute displaced calvarial fracture is visualized.      Impression    1. No acute intracranial abnormality is visualized.      Electronically signed by: Ruslan Crowe MD  Date:    03/17/2019  Time:    12:31     04/5/2018 x-ray lumbar spine  Pedicle screws with janice fixation L2-L3-L4-L5 S1  Disc prosthesis L4-5 and L5-S1  Anterior listhesis of L2 on L3  Retrolisthesis of L3 on L4  Prior laminectomy  Facet arthropathy at multiple levels  Dorsal column stimulator at T9  No hardware complication    03/10/2018 myelogram with CT lumbar spine  -- soft tissue/longitudinal ligament thickening along the posterior vertebral body contours and extending caudally from L1 to the sacral elements.    -- This produces significant multilevel moderate to severe constriction of the thecal sac with noted crowding of the cauda equina.  These changes are most significant at L1-2 through L3-4.    At L3-4, there is a large right asymmetric focal disc protrusion resulting in posterior and leftward displacement of the thecal sac and its contents.  Additionally, there is redemonstrated metallic structure adjacent to the left thecal sac contour in the region of the lateral recess at L4-5; this is unchanged in comparison to the prior.    Stable postoperative and extensive degenerative changes of the lumbar spine.  No evidence of organized fluid collection, acute spinal column disruption, or other significant interval alteration is appreciated.    Lab Results   Component Value Date     (L) 05/06/2020    K 4.7 05/06/2020    MG 2.5 03/16/2019    CL 90 (L) 05/06/2020    CO2 23 05/06/2020    BUN 11 05/06/2020    CREATININE 0.9 05/06/2020     (H) 05/06/2020    HGBA1C 6.8 (H) 05/06/2020    AST 15 05/06/2020    ALT 19 05/06/2020    ALBUMIN 4.0 05/06/2020    PROT 7.6 05/06/2020    BILITOT 0.4 05/06/2020    CHOL 133 01/31/2020    HDL 48 01/31/2020    LDLCALC 66.4  01/31/2020    TRIG 93 01/31/2020       Lab Results   Component Value Date    WBC 6.59 05/06/2020    HGB 11.8 (L) 05/06/2020    HCT 36.0 (L) 05/06/2020    MCV 86 05/06/2020     05/06/2020       Lab Results   Component Value Date    TSH 0.497 09/25/2019       Assessment & Plan:       Problem List Items Addressed This Visit        Neuro    Lumbar stenosis with neurogenic claudication - Primary    Overview     Status post multiple epidural steroid injections and seven back surgeries, most recent L2-S1 fusion 3/6/18          Relevant Medications    OXcarbazepine (TRILEPTAL) 300 MG Tab    Complex regional pain syndrome type 1 of left lower extremity    Diabetic polyneuropathy associated with type 2 diabetes mellitus    Overview     No large fiber neuropathy on NCS 5/24/18 but patient with sensory loss in the feet, most likely a diabetic small fiber neuropathy         Spinal cord stimulator status    Overview     Controlling his back pain and left leg CRPS pain            ID    Arachnoiditis    Overview     Patient most likely has lumbar he has of arachnoiditis after multiple lumbar procedures in 7 surgeries  Most likely the cause of his severe, progressive, right lower extremity pain refractory to surgery (in fact worse after surgery)    At initial consult we had discussed - use of neuro modulating opiate like Nucynta or methadone (nucynta was denied by insurance), lumbar sympathetic blocks, neuropathic intrathecal agents (bupivacaine, clonidine, Prialt)    His pain is showing opiate refractory qualities thus I do not think rotation to methadone is appropriate this point.  Rather, his worst pain is paroxysmal lumbar neuralgia, most appropriate medication would be Trileptal as this has excellent neuralgia coverage         Relevant Medications    OXcarbazepine (TRILEPTAL) 300 MG Tab       Orthopedic    Left leg weakness    Overview     Due to L4 nerve root injury during scar tissue dissection, improving.              Other    Multiple falls    Overview     Multifactorial, poly radiculopathy from lumbar arachnoiditis, left leg weakness due to L4 nerve injury, chronic pain, deconditioning due to chronic pain    Patient is unable to performed outpatient physical therapy including aquatherapy secondary to significant pain and deconditioning.  Explained that further avoidance of physical activity will continue to weak in his legs and therefore worsen his pain.  We need to at least get him doing some bedside exercises to strengthen his legs.  Refer to home health.         Relevant Orders    Ambulatory referral/consult to Home Health              Please call our clinic at [phone] or send a message to Dr. Jean on the [facility] portal if there are any changes to the plan described below, for example,if you are not contacted for the requested tests, referral(s) within one week, if you are unable to receive the medications prescribed, or if you feel you need to change the treatment course for any reason.     TESTING:  -- none     REFERRALS:  -- refer to [facility] home [facility] for physical therapy (bedside exercises to prevent deconditioning) as patient is unable to tolerate outpatient aquatherapy - cannot get in the pool; he is at high risk for worsening falls and worsening leg weakness due to deconditioning related to chronic pain     PREVENTION OF PAIN:   -- continue trileptal 600mg twice a day   -- STOP gabapentin 600 mg 3 times a day  -- continue Lyrica 200 mg 3 times a day ([facility], [place])  -- continue duloxetine 60 mg daily ([facility])    AS-NEEDED TREATMENT OF PAIN:  -- continue tylenol 1000mg three times per day as needed   -- if the right thigh pain returns, please resume Aspercreme (over the counter) 3 times a day on the right thigh      Follow up in about 2 months (around 8/9/2020) for office visit.      Marilia Jean MD

## (undated) DEVICE — CATH MINI TREK 1.20MMX12MM

## (undated) DEVICE — GUIDEWIRE X SPORT .014IN 190CM

## (undated) DEVICE — CATH BLLN FG APEX MR 3.00X15MM

## (undated) DEVICE — GUIDEWIRE CHOICE PT  XS 182CM

## (undated) DEVICE — CATH JACKY RADIAL 5FR 100CM

## (undated) DEVICE — Device

## (undated) DEVICE — SEE MEDLINE ITEM 156894

## (undated) DEVICE — WIRE DOC EXTENSION

## (undated) DEVICE — OMNIPAQUE 350 200ML

## (undated) DEVICE — GUIDEWIRE ROTAWIRE 330X0.014CM

## (undated) DEVICE — PROTECTION STATION PLUS

## (undated) DEVICE — BURR ADVANCER ROTAPRO 1.25X135

## (undated) DEVICE — FLUID DELIVERY SET WITH FILTER

## (undated) DEVICE — KIT GLIDESHEATH SLEND 7FR 10CM

## (undated) DEVICE — GUIDEWIRE SUPRA CORE 035 190CM

## (undated) DEVICE — KIT CUSTOM MANIFOLD

## (undated) DEVICE — BURR ADVANCER ROTAPRO 1.5X135

## (undated) DEVICE — HEMOSTAT VASC BAND REG 24CM

## (undated) DEVICE — CATH EAGLE EYE PLATINUM

## (undated) DEVICE — INFLATOR ENCORE 26 BLLN INFL

## (undated) DEVICE — CATH MINI TREK 1.20MMX15MM

## (undated) DEVICE — SEE MEDLINE ITEM 157187

## (undated) DEVICE — GUIDE WIRE BMW 014 X190

## (undated) DEVICE — CATH TRANSIT

## (undated) DEVICE — KIT CO-PILOT

## (undated) DEVICE — CATH BLLN FG APEX MR 2.50X15MM